# Patient Record
Sex: MALE | Race: WHITE | NOT HISPANIC OR LATINO | Employment: UNEMPLOYED | URBAN - METROPOLITAN AREA
[De-identification: names, ages, dates, MRNs, and addresses within clinical notes are randomized per-mention and may not be internally consistent; named-entity substitution may affect disease eponyms.]

---

## 2021-07-31 ENCOUNTER — HOSPITAL ENCOUNTER (EMERGENCY)
Facility: CLINIC | Age: 61
Discharge: HOME | End: 2021-07-31
Attending: EMERGENCY MEDICINE | Admitting: EMERGENCY MEDICINE
Payer: COMMERCIAL

## 2021-07-31 VITALS
OXYGEN SATURATION: 97 % | HEART RATE: 60 BPM | SYSTOLIC BLOOD PRESSURE: 127 MMHG | DIASTOLIC BLOOD PRESSURE: 82 MMHG | TEMPERATURE: 98 F | RESPIRATION RATE: 14 BRPM | WEIGHT: 222 LBS | BODY MASS INDEX: 30.96 KG/M2

## 2021-07-31 DIAGNOSIS — E10.9 TYPE 1 DIABETES MELLITUS WITHOUT COMPLICATION (H): ICD-10-CM

## 2021-07-31 LAB — GLUCOSE BLDC GLUCOMTR-MCNC: 286 MG/DL (ref 70–99)

## 2021-07-31 PROCEDURE — 99282 EMERGENCY DEPT VISIT SF MDM: CPT | Performed by: EMERGENCY MEDICINE

## 2021-07-31 RX ORDER — FLASH GLUCOSE SCANNING READER
EACH MISCELLANEOUS
Qty: 1 EACH | Refills: 0 | Status: SHIPPED | OUTPATIENT
Start: 2021-07-31 | End: 2024-07-31

## 2021-07-31 NOTE — DISCHARGE INSTRUCTIONS
I am sorry that you had a malfunction of your glucose reader equipment and that you could not get a refill before now    A new prescription for the glucose reader was sent to [facility] pharmacy.    Keep your follow-up appointments as previously scheduled so that you can have appropriate follow-up on your blood sugar management and proceed with getting your pump    Do not hesitate to return to the emergency room if you have any new or concerning symptoms that develop of low or high blood sugar

## 2021-07-31 NOTE — ED PROVIDER NOTES
"  History     Chief Complaint   Patient presents with     Blood Sugar Problem     melvin katherine GRANDA  Galdino Nelson is a 61 year old male who presents to the emergency room because he is unable to test his blood sugar.  Fairly new diagnosis of type 1 diabetes.  He has the Freestyle Felix cardiac monitor, and noted that 2 days ago the reader screen went cloudy and he has been unable to check his blood sugars.  He went to [facility] to get a new reader and they told him he could not have another one until he saw a provider.  He tried calling his primary provider, who is through [employer], and did not get any response.  He is not been symptomatic, and says that \"I know when I am too low and sometimes when I am too high\".  He came to the ER since he did not know where else to go to get this resolved and wants to know what his blood sugars are running.  Denies any chest pain, shortness of breath, vomiting, diarrhea, weakness, blurred vision.    Allergies:  No Known Allergies    Problem List:    Patient Active Problem List    Diagnosis Date Noted     CARDIOVASCULAR SCREENING; LDL GOAL LESS THAN 160 10/31/2010     Priority: Medium        Past Medical History:    No past medical history on file.    Past Surgical History:    No past surgical history on file.    Family History:    No family history on file.    Social History:  Marital Status:  Single [1]  Social History     Tobacco Use     Smoking status: Current Every Day Smoker     Packs/day: 1.00     Smokeless tobacco: Never Used   Substance Use Topics     Alcohol use: Not Currently     Drug use: Not on file        Medications:    Continuous Blood Gluc  (FREESTYLE FELIX 14 DAY READER) CHARLA  Atorvastatin Calcium (LIPITOR PO)  LISINOPRIL PO  PARoxetine HCl (PAXIL PO)          Review of Systems   All other systems reviewed and are negative.      Physical Exam   BP: 127/82  Pulse: 60  Temp: 98  F (36.7  C)  Resp: 14  Weight: 100.7 kg (222 lb)  SpO2: 97 " %      Physical Exam  Vitals and nursing note reviewed.   Constitutional:       General: He is not in acute distress.     Appearance: He is not diaphoretic.   HENT:      Head: Atraumatic.   Eyes:      General: No scleral icterus.     Pupils: Pupils are equal, round, and reactive to light.   Cardiovascular:      Heart sounds: Normal heart sounds.   Pulmonary:      Effort: No respiratory distress.      Breath sounds: Normal breath sounds.   Abdominal:      General: Bowel sounds are normal.      Palpations: Abdomen is soft.      Tenderness: There is no abdominal tenderness.   Musculoskeletal:         General: No tenderness.   Skin:     General: Skin is warm.      Findings: No rash.   Neurological:      Mental Status: He is alert.         ED Course        Procedures              Critical Care time:  none               Results for orders placed or performed during the hospital encounter of 07/31/21 (from the past 24 hour(s))   Glucose by meter   Result Value Ref Range    GLUCOSE BY METER POCT 286 (H) 70 - 99 mg/dL       Medications - No data to display    Assessments & Plan (with Medical Decision Making)  Galdino is a 61-year-old male who presents to the emergency room for issues reading his blood sugar due to malfunctioning glucometer.  See history and focused physical exam as above  Glucose was 286.  No other acute findings on history or physical exam.  Since it is a weekend and patient needs this to monitor his blood sugars, and has been unable to get a hold of his primary provider, will send an order for his  to the pharmacy for him to get a new one.  He is to return to the ER at any time if he has any new or worsening symptoms or has any other problems.  Otherwise he should follow-up with his primary provider.  He states that he is due to get an insulin pump in a few weeks, but does not have all supplies yet.  He feels comfortable with this plan and really just wants to be able to check his blood sugars, is  grateful to have a new order placed.  Discharged in no acute distress.     I have reviewed the nursing notes.    I have reviewed the findings, diagnosis, plan and need for follow up with the patient.       Discharge Medication List as of 7/31/2021 11:34 AM      START taking these medications    Details   Continuous Blood Gluc  (FREESTYLE KATHYA 14 DAY READER) CHARLA Use to read blood sugars as per 's instructions., Disp-1 each, R-0, E-Prescribe             Final diagnoses:   Type 1 diabetes mellitus without complication (H)       7/31/2021   [facility] EMERGENCY DEPT     Juana Cruz DO  07/31/21 6170

## 2021-07-31 NOTE — ED TRIAGE NOTES
"Presents because his glucometer melvin is broken and [facility] will not give him another until he sees a provider. \"I just want to know where my sugars are at\".   "

## 2021-08-21 ENCOUNTER — HOSPITAL ENCOUNTER (EMERGENCY)
Facility: CLINIC | Age: 61
Discharge: HOME | End: 2021-08-21
Attending: NURSE PRACTITIONER | Admitting: NURSE PRACTITIONER
Payer: COMMERCIAL

## 2021-08-21 VITALS
DIASTOLIC BLOOD PRESSURE: 86 MMHG | HEART RATE: 55 BPM | SYSTOLIC BLOOD PRESSURE: 141 MMHG | RESPIRATION RATE: 18 BRPM | TEMPERATURE: 98.3 F | OXYGEN SATURATION: 98 %

## 2021-08-21 DIAGNOSIS — R48.1 LOSS OF PERCEPTION FOR TASTE: ICD-10-CM

## 2021-08-21 DIAGNOSIS — R09.89 CHEST CONGESTION: ICD-10-CM

## 2021-08-21 DIAGNOSIS — Z20.822 ENCOUNTER FOR LABORATORY TESTING FOR COVID-19 VIRUS: ICD-10-CM

## 2021-08-21 LAB — SARS-COV-2 RNA RESP QL NAA+PROBE: POSITIVE

## 2021-08-21 PROCEDURE — 99283 EMERGENCY DEPT VISIT LOW MDM: CPT | Performed by: NURSE PRACTITIONER

## 2021-08-21 PROCEDURE — C9803 HOPD COVID-19 SPEC COLLECT: HCPCS | Performed by: NURSE PRACTITIONER

## 2021-08-21 PROCEDURE — U0005 INFEC AGEN DETEC AMPLI PROBE: HCPCS | Performed by: NURSE PRACTITIONER

## 2021-08-21 PROCEDURE — 99282 EMERGENCY DEPT VISIT SF MDM: CPT | Performed by: NURSE PRACTITIONER

## 2021-08-21 ASSESSMENT — ENCOUNTER SYMPTOMS
FEVER: 0
SLEEP DISTURBANCE: 0
DIAPHORESIS: 0
WHEEZING: 0

## 2021-08-21 NOTE — ED TRIAGE NOTES
"Pt stated \"I have been feeling crappy for the last couple days and my son just called and said his entire family tested positive for COVID and I was with them in the past couple days. I want to get a test and figure things out.\"   "

## 2021-08-21 NOTE — ED PROVIDER NOTES
History     Chief Complaint   Patient presents with     Covid Concern     HPI  Galdino Nelson is a 61 year old male who presents with chest congestion and burning pain with deep breathing of the lungs, also decrease in taste and smell concerning for COVID-19.  He reports he has COPD but no worsening symptoms.  He has not had any fever or chills, no nausea or vomiting, no chest pain, no diarrhea, no rash.  He would like COVID-19 testing performed.    Allergies:  No Known Allergies    Problem List:    Patient Active Problem List    Diagnosis Date Noted     CARDIOVASCULAR SCREENING; LDL GOAL LESS THAN 160 10/31/2010     Priority: Medium        Past Medical History:    History reviewed. No pertinent past medical history.    Past Surgical History:    History reviewed. No pertinent surgical history.    Family History:    No family history on file.    Social History:  Marital Status:  Single [1]  Social History     Tobacco Use     Smoking status: Current Every Day Smoker     Packs/day: 1.00     Smokeless tobacco: Never Used   Substance Use Topics     Alcohol use: Not Currently     Drug use: None        Medications:    Atorvastatin Calcium (LIPITOR PO)  Continuous Blood Gluc  (FREESTYLE KATHYA 14 DAY READER) CHARLA  LISINOPRIL PO  PARoxetine HCl (PAXIL PO)          Review of Systems   Constitutional: Negative for diaphoresis and fever.   Respiratory: Negative for wheezing.    Cardiovascular: Negative for leg swelling.   Psychiatric/Behavioral: Negative for sleep disturbance.       Physical Exam   BP: (!) 141/86  Pulse: 55  Temp: 98.3  F (36.8  C)  Resp: 18  SpO2: 98 %      Physical Exam  Vitals and nursing note reviewed.   Constitutional:       General: He is not in acute distress.     Appearance: Normal appearance. He is not ill-appearing or toxic-appearing.   HENT:      Head: Normocephalic and atraumatic.   Cardiovascular:      Rate and Rhythm: Normal rate and regular rhythm.      Heart sounds: Normal heart  sounds.   Pulmonary:      Effort: Pulmonary effort is normal.      Breath sounds: Normal breath sounds.   Skin:     General: Skin is warm and dry.   Neurological:      General: No focal deficit present.      Mental Status: He is alert.   Psychiatric:         Mood and Affect: Mood normal.         ED Course  I reviewed with the patient COVID-19 testing and to maintain self quarantine until resulted.  If he is positive for COVID-19 I would like him to reach out to his primary care provider as he may qualify for monoclonal antibody infusion.  Directed on purchasing pulse oximeter and testing his oxygen sats as he does have COPD and if consistently less than 90% on room air he is to return back to the ED.  Patient verbalized understanding and discharged home in stable condition post COVID-19 test.               No results found for this or any previous visit (from the past 24 hour(s)).    Medications - No data to display    Assessments & Plan (with Medical Decision Making)     I have reviewed the nursing notes.    I have reviewed the findings, diagnosis, plan and need for follow up with the patient.      New Prescriptions    No medications on file       Final diagnoses:   Chest congestion   Loss of perception for taste   Encounter for laboratory testing for COVID-19 virus       8/21/2021   [facility] EMERGENCY DEPT     Tiana Gordon APRN CNP  08/21/21 6997

## 2021-08-21 NOTE — DISCHARGE INSTRUCTIONS
If you are positive for COVID-19 I recommend purchasing a pulse oximeter.  Periodically check your oxygen saturations and if less than 90% recommend coming back to the emergency department for evaluation.  If you are positive for COVID-19 contact your primary care provider for potential qualification for monoclonal antibody infusion.    Until you get your COVID-19 results maintain self quarantine as directed by the CDC.

## 2021-08-22 ENCOUNTER — TELEPHONE (OUTPATIENT)
Dept: EMERGENCY MEDICINE | Facility: CLINIC | Age: 61
End: 2021-08-22

## 2021-08-22 NOTE — TELEPHONE ENCOUNTER
Coronavirus (COVID-19) Notification    Reason for call  Notify of POSITIVE  COVID-19 lab result, assess symptoms,  review [facility] recommendations    Lab Result   Lab test for 2019-nCoV rRt-PCR or SARS-COV-2 PCR  Oropharyngeal AND/OR nasopharyngeal swabs were POSITIVE for 2019-nCoV RNA [OR] SARS-COV-2 RNA (COVID-19) RNA     We have been unable to reach Patient by phone at this time to notify of their Positive COVID-19 result.  Left voicemail message requesting a call back to [phone] [facility] for results.        POSITIVE COVID-19 Letter sent.    Kyra Enciso LPN

## 2021-09-05 ENCOUNTER — HEALTH MAINTENANCE LETTER (OUTPATIENT)
Age: 61
End: 2021-09-05

## 2021-12-26 ENCOUNTER — HEALTH MAINTENANCE LETTER (OUTPATIENT)
Age: 61
End: 2021-12-26

## 2022-04-17 ENCOUNTER — HEALTH MAINTENANCE LETTER (OUTPATIENT)
Age: 62
End: 2022-04-17

## 2022-06-14 ENCOUNTER — HOSPITAL ENCOUNTER (EMERGENCY)
Facility: CLINIC | Age: 62
Discharge: HOME | End: 2022-06-14
Attending: EMERGENCY MEDICINE | Admitting: EMERGENCY MEDICINE
Payer: COMMERCIAL

## 2022-06-14 ENCOUNTER — APPOINTMENT (OUTPATIENT)
Dept: CT IMAGING | Facility: CLINIC | Age: 62
End: 2022-06-14
Attending: EMERGENCY MEDICINE
Payer: COMMERCIAL

## 2022-06-14 VITALS
RESPIRATION RATE: 20 BRPM | DIASTOLIC BLOOD PRESSURE: 71 MMHG | WEIGHT: 198 LBS | OXYGEN SATURATION: 99 % | BODY MASS INDEX: 27.62 KG/M2 | SYSTOLIC BLOOD PRESSURE: 103 MMHG | TEMPERATURE: 98 F | HEART RATE: 58 BPM

## 2022-06-14 DIAGNOSIS — R53.1 WEAKNESS: ICD-10-CM

## 2022-06-14 DIAGNOSIS — R00.2 PALPITATIONS: ICD-10-CM

## 2022-06-14 DIAGNOSIS — R42 DIZZINESS: ICD-10-CM

## 2022-06-14 DIAGNOSIS — R07.89 CHEST PRESSURE: ICD-10-CM

## 2022-06-14 PROBLEM — I25.10 CAD IN NATIVE ARTERY: Status: ACTIVE | Noted: 2018-09-26

## 2022-06-14 PROBLEM — E11.9 DIABETES MELLITUS, TYPE II (H): Status: ACTIVE | Noted: 2019-09-29

## 2022-06-14 PROBLEM — Z96.41 INSULIN PUMP STATUS: Status: ACTIVE | Noted: 2022-01-24

## 2022-06-14 PROBLEM — E78.5 HYPERLIPIDEMIA: Status: ACTIVE | Noted: 2018-09-26

## 2022-06-14 LAB
ANION GAP SERPL CALCULATED.3IONS-SCNC: 3 MMOL/L (ref 3–14)
BASOPHILS # BLD AUTO: 0.1 10E3/UL (ref 0–0.2)
BASOPHILS NFR BLD AUTO: 1 %
BUN SERPL-MCNC: 27 MG/DL (ref 7–30)
CALCIUM SERPL-MCNC: 8.5 MG/DL (ref 8.5–10.1)
CHLORIDE BLD-SCNC: 105 MMOL/L (ref 94–109)
CO2 SERPL-SCNC: 29 MMOL/L (ref 20–32)
CREAT SERPL-MCNC: 1.26 MG/DL (ref 0.66–1.25)
D DIMER PPP FEU-MCNC: 0.51 UG/ML FEU (ref 0–0.5)
EOSINOPHIL # BLD AUTO: 0.2 10E3/UL (ref 0–0.7)
EOSINOPHIL NFR BLD AUTO: 2 %
ERYTHROCYTE [DISTWIDTH] IN BLOOD BY AUTOMATED COUNT: 13.2 % (ref 10–15)
GFR SERPL CREATININE-BSD FRML MDRD: 64 ML/MIN/1.73M2
GLUCOSE BLD-MCNC: 349 MG/DL (ref 70–99)
HCT VFR BLD AUTO: 42.1 % (ref 40–53)
HGB BLD-MCNC: 14.7 G/DL (ref 13.3–17.7)
HOLD SPECIMEN: NORMAL
IMM GRANULOCYTES # BLD: 0 10E3/UL
IMM GRANULOCYTES NFR BLD: 0 %
LYMPHOCYTES # BLD AUTO: 1.8 10E3/UL (ref 0.8–5.3)
LYMPHOCYTES NFR BLD AUTO: 24 %
MCH RBC QN AUTO: 30.1 PG (ref 26.5–33)
MCHC RBC AUTO-ENTMCNC: 34.9 G/DL (ref 31.5–36.5)
MCV RBC AUTO: 86 FL (ref 78–100)
MONOCYTES # BLD AUTO: 0.5 10E3/UL (ref 0–1.3)
MONOCYTES NFR BLD AUTO: 6 %
NEUTROPHILS # BLD AUTO: 5.2 10E3/UL (ref 1.6–8.3)
NEUTROPHILS NFR BLD AUTO: 67 %
NRBC # BLD AUTO: 0 10E3/UL
NRBC BLD AUTO-RTO: 0 /100
PLATELET # BLD AUTO: 165 10E3/UL (ref 150–450)
POTASSIUM BLD-SCNC: 3.7 MMOL/L (ref 3.4–5.3)
RBC # BLD AUTO: 4.88 10E6/UL (ref 4.4–5.9)
SODIUM SERPL-SCNC: 137 MMOL/L (ref 133–144)
TROPONIN I SERPL HS-MCNC: 6 NG/L
TSH SERPL DL<=0.005 MIU/L-ACNC: 1.08 MU/L (ref 0.4–4)
WBC # BLD AUTO: 7.7 10E3/UL (ref 4–11)

## 2022-06-14 PROCEDURE — 80051 ELECTROLYTE PANEL: CPT | Performed by: EMERGENCY MEDICINE

## 2022-06-14 PROCEDURE — 96360 HYDRATION IV INFUSION INIT: CPT | Mod: 59

## 2022-06-14 PROCEDURE — 36415 COLL VENOUS BLD VENIPUNCTURE: CPT | Performed by: EMERGENCY MEDICINE

## 2022-06-14 PROCEDURE — 71275 CT ANGIOGRAPHY CHEST: CPT

## 2022-06-14 PROCEDURE — 99284 EMERGENCY DEPT VISIT MOD MDM: CPT | Performed by: EMERGENCY MEDICINE

## 2022-06-14 PROCEDURE — 250N000011 HC RX IP 250 OP 636: Performed by: EMERGENCY MEDICINE

## 2022-06-14 PROCEDURE — 84443 ASSAY THYROID STIM HORMONE: CPT | Performed by: EMERGENCY MEDICINE

## 2022-06-14 PROCEDURE — 258N000003 HC RX IP 258 OP 636: Performed by: EMERGENCY MEDICINE

## 2022-06-14 PROCEDURE — 85379 FIBRIN DEGRADATION QUANT: CPT | Performed by: EMERGENCY MEDICINE

## 2022-06-14 PROCEDURE — 85025 COMPLETE CBC W/AUTO DIFF WBC: CPT | Performed by: EMERGENCY MEDICINE

## 2022-06-14 PROCEDURE — 250N000009 HC RX 250: Performed by: EMERGENCY MEDICINE

## 2022-06-14 PROCEDURE — 84484 ASSAY OF TROPONIN QUANT: CPT | Performed by: EMERGENCY MEDICINE

## 2022-06-14 PROCEDURE — 99285 EMERGENCY DEPT VISIT HI MDM: CPT | Mod: 25

## 2022-06-14 RX ORDER — METHOCARBAMOL 750 MG/1
750-1500 TABLET, FILM COATED ORAL 3 TIMES DAILY PRN
Status: ON HOLD | COMMUNITY
Start: 2022-05-04 | End: 2023-10-04

## 2022-06-14 RX ORDER — IOPAMIDOL 755 MG/ML
500 INJECTION, SOLUTION INTRAVASCULAR ONCE
Status: COMPLETED | OUTPATIENT
Start: 2022-06-14 | End: 2022-06-14

## 2022-06-14 RX ORDER — PAROXETINE 30 MG/1
30 TABLET, FILM COATED ORAL DAILY
COMMUNITY
Start: 2022-06-08 | End: 2024-09-09

## 2022-06-14 RX ORDER — AMOXICILLIN 500 MG/1
2000 CAPSULE ORAL
Status: ON HOLD | COMMUNITY
End: 2023-10-06

## 2022-06-14 RX ORDER — INSULIN LISPRO 100 [IU]/ML
70 INJECTION, SOLUTION INTRAVENOUS; SUBCUTANEOUS DAILY
Status: ON HOLD | COMMUNITY
Start: 2022-04-30 | End: 2023-10-06

## 2022-06-14 RX ORDER — LOSARTAN POTASSIUM AND HYDROCHLOROTHIAZIDE 12.5; 5 MG/1; MG/1
1 TABLET ORAL DAILY
Status: ON HOLD | COMMUNITY
Start: 2021-11-11 | End: 2023-10-04

## 2022-06-14 RX ORDER — ATORVASTATIN CALCIUM 40 MG/1
40 TABLET, FILM COATED ORAL DAILY
Status: ON HOLD | COMMUNITY
Start: 2020-08-14 | End: 2023-10-04

## 2022-06-14 RX ORDER — METOPROLOL SUCCINATE 50 MG/1
1 TABLET, EXTENDED RELEASE ORAL DAILY
COMMUNITY
Start: 2022-06-08 | End: 2024-07-31

## 2022-06-14 RX ORDER — SEMAGLUTIDE 1.34 MG/ML
0.5 INJECTION, SOLUTION SUBCUTANEOUS
Status: ON HOLD | COMMUNITY
Start: 2021-12-13 | End: 2023-10-04

## 2022-06-14 RX ORDER — BLOOD SUGAR DIAGNOSTIC
1 STRIP MISCELLANEOUS 3 TIMES DAILY
COMMUNITY
Start: 2022-01-20

## 2022-06-14 RX ADMIN — IOPAMIDOL 67 ML: 755 INJECTION, SOLUTION INTRAVENOUS at 16:03

## 2022-06-14 RX ADMIN — SODIUM CHLORIDE 70 ML: 9 INJECTION, SOLUTION INTRAVENOUS at 16:03

## 2022-06-14 RX ADMIN — SODIUM CHLORIDE 1000 ML: 9 INJECTION, SOLUTION INTRAVENOUS at 15:55

## 2022-06-14 NOTE — ED TRIAGE NOTES
Patient c/o intermittent racing heart and chest pain x1 week. Heart rate in the 60's at this time.     Triage Assessment     Row Name 06/14/22 0160       Triage Assessment (Adult)    Airway WDL WDL       Respiratory WDL    Respiratory WDL WDL       Skin Circulation/Temperature WDL    Skin Circulation/Temperature WDL WDL

## 2022-06-14 NOTE — DISCHARGE INSTRUCTIONS
I put in an order for a cardiac monitor.  This can be placed in this facility tomorrow.    Please make an appointment to see your primary care provider soon as possible.  I will try to have the results sent to your primary care provider.    I would also recommend getting a stress test and discuss referral to cardiology for continued symptoms.    Return at anytime for significant worsening, changes or concerns    I hope you start to feel much better quickly!!

## 2022-06-15 ENCOUNTER — PATIENT OUTREACH (OUTPATIENT)
Dept: CARE COORDINATION | Facility: CLINIC | Age: 62
End: 2022-06-15
Payer: COMMERCIAL

## 2022-06-15 DIAGNOSIS — Z71.89 OTHER SPECIFIED COUNSELING: ICD-10-CM

## 2022-06-15 NOTE — ED PROVIDER NOTES
"  History     Chief Complaint   Patient presents with     Tachycardia     HPI  History per patient and medical records    This is a 62-year-old male, history of insulin-dependent diabetes, hypertension, hyperlipidemia, migraine, COPD with continued tobacco use, presenting with tachycardia.  Patient states that he started having symptoms of a \"hard time breathing\" 6 days ago while he was building a deck.  He apparently also had some chest tightness.  He was brought via EMS to an outside ED.  EKG showed no obvious ischemia but he did have some PVCs, he had 2 negative troponins.  Stress test recommended.    Patient notes that he continues to have symptoms including weakness, chest tightness and also has developed dizziness.  He describes the dizziness as a spinning that happens with different changing positions including sitting to standing, bending, etc.  He has felt symptoms of his heart racing and pounding even without exertion.  He denies any fevers, chills, sore throat, headache.  No nausea, vomiting, bowel or bladder changes.  No calf pain or tenderness.  No cough.  He does smoke 1 pack/day.  His blood sugars average 150.  He takes his blood pressure medication but often forgets to take his cholesterol medication.  He is not on blood thinners.  No history of blood clots.    Allergies:  No Known Allergies    Problem List:    Patient Active Problem List    Diagnosis Date Noted     Insulin pump status 01/24/2022     Priority: Medium     Formatting of this note might be different from the original.  770 G- 1/21/2022       Diabetes mellitus, type II (H) 09/29/2019     Priority: Medium     CAD in native artery 09/26/2018     Priority: Medium     Formatting of this note might be different from the original.  Pt reports Hx of MI in 2002- No stents.   Stress Test 2009- No ischemia.   On metoprolol, atorvastatin, recommend baby ASA daily.       Hyperlipidemia 09/26/2018     Priority: Medium     Formatting of this note " might be different from the original.  On Atorvastatin.       Chronic obstructive pulmonary disease (H) 11/18/2011     Priority: Medium     Formatting of this note might be different from the original.  PFT's done 7/2018- showing moderate COPD with positive bronchodilator response  Smoking Cessation Advised.   No on any daily medications- No frequent exacerbations/hospitalizations.       Migraine 11/18/2011     Priority: Medium     CARDIOVASCULAR SCREENING; LDL GOAL LESS THAN 160 10/31/2010     Priority: Medium     Tobacco use disorder 08/10/2006     Priority: Medium     Formatting of this note might be different from the original.  Started on Chantix 8/2018. Working on quitting on 10/6/2018. History of smoking for 45 years, 1 PPD.       Essential hypertension 01/12/2005     Priority: Medium     Formatting of this note might be different from the original.  Updated by system to replace inactive record  Formatting of this note might be different from the original.  Epic          Past Medical History:    Past Medical History:   Diagnosis Date     Chronic obstructive pulmonary disease (H) 11/18/2011       Past Surgical History:    No past surgical history on file.    Family History:    No family history on file.    Social History:  Marital Status:  Single [1]  Social History     Tobacco Use     Smoking status: Current Every Day Smoker     Packs/day: 1.00     Smokeless tobacco: Never Used   Substance Use Topics     Alcohol use: Not Currently        Medications:    amoxicillin (AMOXIL) 500 MG capsule  atorvastatin (LIPITOR) 40 MG tablet  blood glucose (ACCU-CHEK GUIDE) test strip  insulin lispro (HUMALOG VIAL) 100 UNIT/ML vial  losartan-hydrochlorothiazide (HYZAAR) 50-12.5 MG tablet  methocarbamol (ROBAXIN) 750 MG tablet  metoprolol succinate ER (TOPROL XL) 50 MG 24 hr tablet  PARoxetine (PAXIL) 30 MG tablet  semaglutide (OZEMPIC, 0.25 OR 0.5 MG/DOSE,) 2 MG/1.5ML SOPN pen  Continuous Blood Gluc  (FREESTYLE KATHYA  14 DAY READER) CHARLA          Review of Systems   All other ROS reviewed and are negative or non-contributory except as stated in HPI.     Physical Exam   BP: 99/62  Pulse: 67  Temp: 98  F (36.7  C)  Resp: 20  Weight: 89.8 kg (198 lb)  SpO2: 98 %      Physical Exam  Vitals and nursing note reviewed.   Constitutional:       Appearance: Normal appearance. He is normal weight.      Comments: Pleasant, healthy-appearing male sitting in the bed   HENT:      Head: Normocephalic.      Nose: Nose normal.      Mouth/Throat:      Comments: Tacky mucous membranes  Eyes:      Extraocular Movements: Extraocular movements intact.      Conjunctiva/sclera: Conjunctivae normal.   Cardiovascular:      Rate and Rhythm: Normal rate and regular rhythm.      Pulses: Normal pulses.      Heart sounds: Normal heart sounds.   Pulmonary:      Effort: Pulmonary effort is normal.      Breath sounds: Normal breath sounds.      Comments: No obvious wheeze or adventitious breath sounds even with forced expiration  Abdominal:      Palpations: Abdomen is soft.      Tenderness: There is no abdominal tenderness.   Musculoskeletal:         General: No tenderness. Normal range of motion.      Cervical back: Normal range of motion and neck supple.      Right lower leg: No edema.      Left lower leg: No edema.   Skin:     General: Skin is warm and dry.   Neurological:      General: No focal deficit present.      Mental Status: He is alert and oriented to person, place, and time.   Psychiatric:         Mood and Affect: Mood normal.         Behavior: Behavior normal.         ED Course (with Medical Decision Making)    Pt seen and examined by me.  RN and EPIC notes reviewed.       Patient with symptoms of heart racing, chest tightness, dizziness, weakness, recent dyspnea on exertion.  He also recently was seen and had 2 negative troponins and unremarkable EKG about 1 week ago.  On exam, patient's vital signs are within normal limits, lungs are clear.    EKG  was done.  This showed normal sinus rhythm with a rate of 61.  There is no ectopy.  No acute ST segment or other abnormalities.  BMP shows mildly elevated BUN/creatinine.  He was given some fluids.  CBC within normal limits.  Troponin normal.  D-dimer mildly elevated.  TSH is normal.  CT scan for PE study was done.  This showed no evidence for PE or other obvious acute abnormality.  He does have a small amount of coronary calcifications.    We did orthostatics which were unremarkable.  Some ectopy noted with orthostatics.    I discussed all the results at length with the patient.  He already has been referred for stress test which I think is important.  I am also going to place an order for a Zio patch.  I would like him to call and follow-up closely with his primary care provider and consider referral for cardiology.  Continue to monitor symptoms.  If he has any significant worsening, changes or concerns return promptly at any time.        Procedures    Results for orders placed or performed during the hospital encounter of 06/14/22 (from the past 24 hour(s))   CBC with Platelets & Differential    Narrative    The following orders were created for panel order CBC with Platelets & Differential.  Procedure                               Abnormality         Status                     ---------                               -----------         ------                     CBC with platelets and d...[[id number]]                      Final result                 Please view results for these tests on the individual orders.   Basic metabolic panel   Result Value Ref Range    Sodium 137 133 - 144 mmol/L    Potassium 3.7 3.4 - 5.3 mmol/L    Chloride 105 94 - 109 mmol/L    Carbon Dioxide (CO2) 29 20 - 32 mmol/L    Anion Gap 3 3 - 14 mmol/L    Urea Nitrogen 27 7 - 30 mg/dL    Creatinine 1.26 (H) 0.66 - 1.25 mg/dL    Calcium 8.5 8.5 - 10.1 mg/dL    Glucose 349 (H) 70 - 99 mg/dL    GFR Estimate 64 >60 mL/min/1.73m2   Troponin I    Result Value Ref Range    Troponin I High Sensitivity 6 <79 ng/L   [place] Draw    Narrative    The following orders were created for panel order [place] Draw.  Procedure                               Abnormality         Status                     ---------                               -----------         ------                     Extra Blue Top Tube[[id number]]                              Final result                 Please view results for these tests on the individual orders.   CBC with platelets and differential   Result Value Ref Range    WBC Count 7.7 4.0 - 11.0 10e3/uL    RBC Count 4.88 4.40 - 5.90 10e6/uL    Hemoglobin 14.7 13.3 - 17.7 g/dL    Hematocrit 42.1 40.0 - 53.0 %    MCV 86 78 - 100 fL    MCH 30.1 26.5 - 33.0 pg    MCHC 34.9 31.5 - 36.5 g/dL    RDW 13.2 10.0 - 15.0 %    Platelet Count 165 150 - 450 10e3/uL    % Neutrophils 67 %    % Lymphocytes 24 %    % Monocytes 6 %    % Eosinophils 2 %    % Basophils 1 %    % Immature Granulocytes 0 %    NRBCs per 100 WBC 0 <1 /100    Absolute Neutrophils 5.2 1.6 - 8.3 10e3/uL    Absolute Lymphocytes 1.8 0.8 - 5.3 10e3/uL    Absolute Monocytes 0.5 0.0 - 1.3 10e3/uL    Absolute Eosinophils 0.2 0.0 - 0.7 10e3/uL    Absolute Basophils 0.1 0.0 - 0.2 10e3/uL    Absolute Immature Granulocytes 0.0 <=0.4 10e3/uL    Absolute NRBCs 0.0 10e3/uL   Extra Blue Top Tube   Result Value Ref Range    Hold Specimen [facility]    D dimer quantitative   Result Value Ref Range    D-Dimer Quantitative 0.51 (H) 0.00 - 0.50 ug/mL FEU    Narrative    This D-dimer assay is intended for use in conjunction with a clinical pretest probability assessment model to exclude pulmonary embolism (PE) and deep venous thrombosis (DVT) in outpatients suspected of PE or DVT. The cut-off value is 0.50 ug/mL FEU.   TSH with free T4 reflex   Result Value Ref Range    TSH 1.08 0.40 - 4.00 mU/L   CT Chest Pulmonary Embolism w Contrast    Narrative    CT CHEST PULMONARY EMBOLISM W CONTRAST 6/14/2022 4:16  PM    CLINICAL HISTORY: Dyspnea on exertion/shortness of breath,  palpitations  TECHNIQUE: CT angiogram chest during arterial phase injection IV  contrast. 2D and 3D MIP reconstructions were performed by the CT  technologist. Dose reduction techniques were used.     CONTRAST: ISOVUE-370, 67mL    COMPARISON: Chest radiograph 5/10/2016    FINDINGS:  ANGIOGRAM CHEST: Pulmonary arteries are normal caliber and negative  for pulmonary emboli. Thoracic aorta is not well opacified and is  indeterminate for dissection. No CT evidence of right heart strain.    LUNGS AND PLEURA: Normal.    MEDIASTINUM/AXILLAE: Normal.    CORONARY ARTERY CALCIFICATION: Mild.    UPPER ABDOMEN: Unremarkable.    MUSCULOSKELETAL: No acute bony abnormality.      Impression    IMPRESSION:  1.  No evidence of pulmonary embolus or other acute abnormality in the  chest.    NAS HOUSE MD         SYSTEM ID:  [id number]       Medications   0.9% sodium chloride BOLUS (0 mLs Intravenous Stopped 6/14/22 1701)   iopamidol (ISOVUE-370) solution 500 mL (67 mLs Intravenous Given 6/14/22 1603)   sodium chloride 0.9 % bag 100mL for CT scan flush use (70 mLs Intravenous Given 6/14/22 1603)       Assessments & Plan      I have reviewed the findings, diagnosis, plan and need for follow up with the patient.    Discharge Medication List as of 6/14/2022  5:17 PM          Final diagnoses:   Palpitations   Chest pressure   Dizziness   Weakness     Disposition: Patient discharged home in stable condition.  Plan as above.  Return for concerns.     Note: Chart documentation done in part with Dragon Voice Recognition software. Although reviewed after completion, some word and grammatical errors may remain.     6/14/2022   [facility] EMERGENCY DEPT     Jana Negron MD  06/14/22 2032

## 2022-06-15 NOTE — PROGRESS NOTES
Clinic Care Coordination Contact  [facility]/Voicemail     Clinical Data: Care Coordinator Outreach - TCM      Outreach attempted x 1.  Left message on patient's voicemail, providing [facility]'s 24/7 scheduling and nurse triage phone number [phone] ([phone]) for questions/concerns.      Plan:  Care Coordinator will try to reach patient again in 1-2 business days.       Linda Richardson, DIAN  [facility], [facility]

## 2022-06-16 ENCOUNTER — HOSPITAL ENCOUNTER (OUTPATIENT)
Dept: CARDIOLOGY | Facility: CLINIC | Age: 62
Discharge: HOME | End: 2022-06-16
Attending: EMERGENCY MEDICINE | Admitting: EMERGENCY MEDICINE
Payer: COMMERCIAL

## 2022-06-16 DIAGNOSIS — R07.89 CHEST PRESSURE: ICD-10-CM

## 2022-06-16 DIAGNOSIS — R42 DIZZINESS: ICD-10-CM

## 2022-06-16 DIAGNOSIS — R00.2 PALPITATIONS: ICD-10-CM

## 2022-06-16 PROCEDURE — 93225 XTRNL ECG REC<48 HRS REC: CPT

## 2022-06-16 NOTE — PROGRESS NOTES
"Clinic Care Coordination Contact  [facility]: Post-Discharge Note  SITUATION                                                      Admission:    Admission Date: 06/14/22   Reason for Admission: Palpitations,  Chest pressure,    Dizziness,    Weakness  Discharge:   Discharge Date: 06/14/22  Discharge Diagnosis: Palpitations,    Chest pressure,    Dizziness,    Weakness    BACKGROUND                                                      Per hospital discharge summary and inpatient provider notes:    This is a 62-year-old male, history of insulin-dependent diabetes, hypertension, hyperlipidemia, migraine, COPD with continued tobacco use, presenting with tachycardia.  Patient states that he started having symptoms of a \"hard time breathing\" 6 days ago while he was building a deck.  He apparently also had some chest tightness.  He was brought via EMS to an outside ED.  EKG showed no obvious ischemia but he did have some PVCs, he had 2 negative troponins.  Stress test recommended.     Patient notes that he continues to have symptoms including weakness, chest tightness and also has developed dizziness.  He describes the dizziness as a spinning that happens with different changing positions including sitting to standing, bending, etc.  He has felt symptoms of his heart racing and pounding even without exertion.  He denies any fevers, chills, sore throat, headache.  No nausea, vomiting, bowel or bladder changes.  No calf pain or tenderness.  No cough.  He does smoke 1 pack/day.  His blood sugars average 150.  He takes his blood pressure medication but often forgets to take his cholesterol medication.  He is not on blood thinners.  No history of blood clots.      ASSESSMENT      Enrollment  Primary Care Care Coordination Status: Not a Candidate    Discharge Assessment  How are you doing now that you are home?: Patient states he is about the same. Patient states when he is not doing much he feels fine.  How are your " symptoms? (Red Flag symptoms escalate to triage hotline per guidelines): Unchanged  Do you feel your condition is stable enough to be safe at home until your provider visit?: Yes  Does the patient have their discharge instructions? : Yes  Does the patient have questions regarding their discharge instructions? : No  Were you started on any new medications or were there changes to any of your previous medications? : No  Does the patient have all of their medications?:  (N/A- No change in medications.)  Do you have questions regarding any of your medications? :  (N/A- No change in medications.)  Do you have all of your needed medical supplies or equipment (DME)?  (i.e. oxygen tank, CPAP, cane, etc.):  (N/A)  Discharge follow-up appointment scheduled within 14 calendar days? : No  Is patient agreeable to assistance with scheduling? : No (Patient states he will follow up with PCP after he sees Cardiology. Has appointment today for cardiac monitor.)         Post-op (Clinicians Only)  Did the patient have surgery or a procedure: No    Patient states he has an appointment today at 2:15 for cardiac monitor.  Patient states he does not have appointment to see his primary provider but wants to wait to schedule until he sees the cardiologist.      Patient states his chest pain has improved. States it is very slight now where it was more severe in the hospital. Advised if worsening or he develops shortness of breath in addition to the chest pain to go to ED. Patient agreed to plan.     PLAN                                                      Outpatient Plan:  Schedule an appointment with primary provider; And discuss a stress test.        Future Appointments   Date Time Provider Department Center   6/16/2022  2:15 PM PH EVENT/HOLTER MONITOR [facility]         For any urgent concerns, please contact our 24 hour nurse triage line: [phone] ([phone])         Linda Richardson RN

## 2022-08-07 ENCOUNTER — HEALTH MAINTENANCE LETTER (OUTPATIENT)
Age: 62
End: 2022-08-07

## 2022-10-23 ENCOUNTER — HEALTH MAINTENANCE LETTER (OUTPATIENT)
Age: 62
End: 2022-10-23

## 2022-11-09 ENCOUNTER — TRANSCRIBE ORDERS (OUTPATIENT)
Dept: OTHER | Age: 62
End: 2022-11-09

## 2022-11-09 DIAGNOSIS — M54.50 ACUTE MIDLINE LOW BACK PAIN WITHOUT SCIATICA: Primary | ICD-10-CM

## 2022-11-15 ENCOUNTER — HOSPITAL ENCOUNTER (OUTPATIENT)
Dept: PHYSICAL THERAPY | Facility: CLINIC | Age: 62
Setting detail: THERAPIES SERIES
Discharge: HOME | End: 2022-11-15
Attending: FAMILY MEDICINE
Payer: COMMERCIAL

## 2022-11-15 PROCEDURE — 97110 THERAPEUTIC EXERCISES: CPT | Mod: GP | Performed by: PHYSICAL THERAPIST

## 2022-11-15 PROCEDURE — 97161 PT EVAL LOW COMPLEX 20 MIN: CPT | Mod: GP | Performed by: PHYSICAL THERAPIST
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
## 2022-11-30 ENCOUNTER — HOSPITAL ENCOUNTER (OUTPATIENT)
Dept: PHYSICAL THERAPY | Facility: CLINIC | Age: 62
Setting detail: THERAPIES SERIES
Discharge: HOME | End: 2022-11-30
Attending: FAMILY MEDICINE
Payer: COMMERCIAL

## 2022-11-30 PROCEDURE — 97014 ELECTRIC STIMULATION THERAPY: CPT | Mod: GP | Performed by: PHYSICAL THERAPIST

## 2022-11-30 PROCEDURE — 97110 THERAPEUTIC EXERCISES: CPT | Mod: GP | Performed by: PHYSICAL THERAPIST

## 2022-11-30 PROCEDURE — 97010 HOT OR COLD PACKS THERAPY: CPT | Mod: GP | Performed by: PHYSICAL THERAPIST

## 2022-12-07 ENCOUNTER — HOSPITAL ENCOUNTER (OUTPATIENT)
Dept: PHYSICAL THERAPY | Facility: CLINIC | Age: 62
Setting detail: THERAPIES SERIES
Discharge: HOME | End: 2022-12-07
Attending: FAMILY MEDICINE
Payer: COMMERCIAL

## 2022-12-07 PROCEDURE — 97010 HOT OR COLD PACKS THERAPY: CPT | Mod: GP | Performed by: PHYSICAL THERAPIST

## 2022-12-07 PROCEDURE — 97014 ELECTRIC STIMULATION THERAPY: CPT | Mod: GP | Performed by: PHYSICAL THERAPIST

## 2022-12-07 PROCEDURE — 97110 THERAPEUTIC EXERCISES: CPT | Mod: GP | Performed by: PHYSICAL THERAPIST

## 2022-12-10 ENCOUNTER — HEALTH MAINTENANCE LETTER (OUTPATIENT)
Age: 62
End: 2022-12-10

## 2022-12-21 ENCOUNTER — HOSPITAL ENCOUNTER (OUTPATIENT)
Dept: PHYSICAL THERAPY | Facility: CLINIC | Age: 62
Setting detail: THERAPIES SERIES
Discharge: HOME | End: 2022-12-21
Attending: FAMILY MEDICINE
Payer: COMMERCIAL

## 2022-12-21 PROCEDURE — 97010 HOT OR COLD PACKS THERAPY: CPT | Mod: GP | Performed by: PHYSICAL THERAPIST

## 2022-12-21 PROCEDURE — 97110 THERAPEUTIC EXERCISES: CPT | Mod: GP | Performed by: PHYSICAL THERAPIST

## 2022-12-21 PROCEDURE — 97014 ELECTRIC STIMULATION THERAPY: CPT | Mod: GP | Performed by: PHYSICAL THERAPIST

## 2022-12-27 ENCOUNTER — HOSPITAL ENCOUNTER (OUTPATIENT)
Dept: PHYSICAL THERAPY | Facility: CLINIC | Age: 62
Setting detail: THERAPIES SERIES
Discharge: HOME | End: 2022-12-27
Attending: FAMILY MEDICINE
Payer: COMMERCIAL

## 2022-12-27 PROCEDURE — 97110 THERAPEUTIC EXERCISES: CPT | Mod: GP | Performed by: PHYSICAL THERAPIST

## 2022-12-27 PROCEDURE — 97014 ELECTRIC STIMULATION THERAPY: CPT | Mod: GP | Performed by: PHYSICAL THERAPIST

## 2022-12-27 PROCEDURE — 97010 HOT OR COLD PACKS THERAPY: CPT | Mod: GP | Performed by: PHYSICAL THERAPIST

## 2023-01-12 ENCOUNTER — HOSPITAL ENCOUNTER (OUTPATIENT)
Dept: PHYSICAL THERAPY | Facility: CLINIC | Age: 63
Setting detail: THERAPIES SERIES
Discharge: HOME | End: 2023-01-12
Attending: FAMILY MEDICINE
Payer: COMMERCIAL

## 2023-01-12 PROCEDURE — 97010 HOT OR COLD PACKS THERAPY: CPT | Mod: GP | Performed by: PHYSICAL THERAPIST

## 2023-01-12 PROCEDURE — 97110 THERAPEUTIC EXERCISES: CPT | Mod: GP | Performed by: PHYSICAL THERAPIST

## 2023-01-12 PROCEDURE — 97014 ELECTRIC STIMULATION THERAPY: CPT | Mod: GP | Performed by: PHYSICAL THERAPIST
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
## 2023-01-18 ENCOUNTER — HOSPITAL ENCOUNTER (OUTPATIENT)
Dept: PHYSICAL THERAPY | Facility: CLINIC | Age: 63
Setting detail: THERAPIES SERIES
Discharge: HOME | End: 2023-01-18
Attending: FAMILY MEDICINE
Payer: COMMERCIAL

## 2023-01-18 PROCEDURE — 97014 ELECTRIC STIMULATION THERAPY: CPT | Mod: GP | Performed by: PHYSICAL THERAPIST

## 2023-01-18 PROCEDURE — 97010 HOT OR COLD PACKS THERAPY: CPT | Mod: GP | Performed by: PHYSICAL THERAPIST

## 2023-01-18 PROCEDURE — 97110 THERAPEUTIC EXERCISES: CPT | Mod: GP | Performed by: PHYSICAL THERAPIST

## 2023-01-18 NOTE — PROGRESS NOTES
[facility]    Outpatient Physical Therapy Discharge Note  Patient: Galdino Nelson  : 1960    Beginning/End Dates of Reporting Period:  11/15/2022 to 2023    Referring Provider: leonid saenz MD    Therapy Diagnosis: neck and back pain      Client Self Report: returns from doctor with good report is returning to work next , feels he will do well    Objective Measurements:        Objective Measure: GERMAINE at eval 33.33 currently 15.56  Details: at eval Headache is a 5/10 typical and 8/10 at worst, the neck pain is a 5/10 typical and 6/10 at worst and the thoracic pain is a 6/10 typial and 8/10 at worst.  Objective Measure: currently pain neck pain 0-3/10 mid back pain 0-5/10 low back 0-3/10 pain is 75% better , overall pain is improved still has pain when he drives along time will be stiff when he gets out , overall has return to prior of level of function      patient seen for 7 Rx sessions for exercises and HEP and in clinic also completed hot pack with IFC to neck and low back at last Rx he was completing the following green theraband on doorknob B shoulder extension 30 reps 2x day  , supine bent knee trunk rotation , SKC , SLR , hip abduction and prone knee flexion 10x 2 , continue with seated cervical retraction 10 reps 2x day, added prone lay 15 minutes 3 x day, in clinic nustep x 15 level 1     Goals:  Goal Identifier Symptoms   Goal Description Galdino will use strategies learned in P.T. to decrease pain levels to help reach personal goal of symptom relief.   Target Date 12/15/22   Date Met      Progress (detail required for progress note):       Goal Identifier Sitting tolerance   Goal Description Galdino will sit for 4 hours without increasing back pain. Which is needed for his job duties.   Target Date 23   Date Met      Progress (detail required for progress note): is able to sit for 4  hours but is then very stiff when he moves     Goal Identifier Function   Goal Description Galdino will improve his GERMAINE by 30% indicating improved overall function.   Target Date 02/13/23   Date Met      Progress (detail required for progress note): GERMAINE at eval 33.33 currently 15.56       Plan:  Discharge from therapy.    Discharge:    Reason for Discharge: Patient has met all goals.    Equipment Issued: none    Discharge Plan: Patient to continue home program.

## 2023-04-02 ENCOUNTER — HEALTH MAINTENANCE LETTER (OUTPATIENT)
Age: 63
End: 2023-04-02

## 2023-07-22 ENCOUNTER — NURSE TRIAGE (OUTPATIENT)
Dept: NURSING | Facility: CLINIC | Age: 63
End: 2023-07-22
Payer: MEDICAID

## 2023-07-23 NOTE — TELEPHONE ENCOUNTER
Patient is calling to report symptomatic hyperglycemia.    He came home this afternoon with BG of over 600.  He has an insulin pump and was recommended to give 13u.  Checked it a couple hours later and it was still at 500.  Gave another 8u per pump.  Checked it again and it was 300.  Gave another 7u per pump.    He feels very dizzy and has difficulty breathing.  BP was 114/76 - which he states is low for him.  He hasn't taken his metoprolol and lisinopril in a couple of weeks.    Disposition:  Advised patient to be seen in the ED now. Care advice given.  Pt verbalized understanding.    Jovana Ochoa, RN, BSN Nurse Triage Advisor 7/22/2023 11:20 PM       Reason for Disposition    [1] Blood glucose > 240 mg/dL (13.3 mmol/L) AND [2] rapid breathing    Additional Information    Negative: Unconscious or difficult to awaken    Negative: Acting confused (e.g., disoriented, slurred speech)    Negative: Very weak (e.g., can't stand)    Negative: Sounds like a life-threatening emergency to the triager    Negative: [1] Vomiting AND [2] signs of dehydration (e.g., very dry mouth, lightheaded, dark urine)    Protocols used: DIABETES - HIGH BLOOD SUGAR-A-

## 2023-08-27 ENCOUNTER — HEALTH MAINTENANCE LETTER (OUTPATIENT)
Age: 63
End: 2023-08-27

## 2023-09-27 ENCOUNTER — APPOINTMENT (OUTPATIENT)
Dept: GENERAL RADIOLOGY | Facility: CLINIC | Age: 63
End: 2023-09-27
Attending: FAMILY MEDICINE
Payer: MEDICAID

## 2023-09-27 ENCOUNTER — HOSPITAL ENCOUNTER (EMERGENCY)
Facility: CLINIC | Age: 63
Discharge: HOME | End: 2023-09-28
Attending: FAMILY MEDICINE | Admitting: FAMILY MEDICINE
Payer: MEDICAID

## 2023-09-27 DIAGNOSIS — J44.1 COPD EXACERBATION (H): ICD-10-CM

## 2023-09-27 DIAGNOSIS — E11.65 HYPERGLYCEMIA DUE TO DIABETES MELLITUS (H): ICD-10-CM

## 2023-09-27 LAB
ALBUMIN SERPL BCG-MCNC: 4.4 G/DL (ref 3.5–5.2)
ALP SERPL-CCNC: 114 U/L (ref 40–129)
ALT SERPL W P-5'-P-CCNC: 13 U/L (ref 0–70)
ANION GAP SERPL CALCULATED.3IONS-SCNC: 14 MMOL/L (ref 7–15)
AST SERPL W P-5'-P-CCNC: 16 U/L (ref 0–45)
BASE EXCESS BLDV CALC-SCNC: -1 MMOL/L (ref -7.7–1.9)
BASOPHILS # BLD AUTO: 0.1 10E3/UL (ref 0–0.2)
BASOPHILS NFR BLD AUTO: 1 %
BILIRUB SERPL-MCNC: 0.7 MG/DL
BUN SERPL-MCNC: 16.8 MG/DL (ref 8–23)
CALCIUM SERPL-MCNC: 9.6 MG/DL (ref 8.8–10.2)
CHLORIDE SERPL-SCNC: 97 MMOL/L (ref 98–107)
CREAT SERPL-MCNC: 0.95 MG/DL (ref 0.67–1.17)
D DIMER PPP FEU-MCNC: 0.28 UG/ML FEU (ref 0–0.5)
DEPRECATED HCO3 PLAS-SCNC: 22 MMOL/L (ref 22–29)
EGFRCR SERPLBLD CKD-EPI 2021: 90 ML/MIN/1.73M2
EOSINOPHIL # BLD AUTO: 0.2 10E3/UL (ref 0–0.7)
EOSINOPHIL NFR BLD AUTO: 3 %
ERYTHROCYTE [DISTWIDTH] IN BLOOD BY AUTOMATED COUNT: 12.9 % (ref 10–15)
GLUCOSE SERPL-MCNC: 419 MG/DL (ref 70–99)
HCO3 BLDV-SCNC: 26 MMOL/L (ref 21–28)
HCT VFR BLD AUTO: 46.3 % (ref 40–53)
HGB BLD-MCNC: 16.2 G/DL (ref 13.3–17.7)
IMM GRANULOCYTES # BLD: 0 10E3/UL
IMM GRANULOCYTES NFR BLD: 0 %
LYMPHOCYTES # BLD AUTO: 2.2 10E3/UL (ref 0.8–5.3)
LYMPHOCYTES NFR BLD AUTO: 28 %
MCH RBC QN AUTO: 30.3 PG (ref 26.5–33)
MCHC RBC AUTO-ENTMCNC: 35 G/DL (ref 31.5–36.5)
MCV RBC AUTO: 87 FL (ref 78–100)
MONOCYTES # BLD AUTO: 0.5 10E3/UL (ref 0–1.3)
MONOCYTES NFR BLD AUTO: 7 %
NEUTROPHILS # BLD AUTO: 4.9 10E3/UL (ref 1.6–8.3)
NEUTROPHILS NFR BLD AUTO: 61 %
NRBC # BLD AUTO: 0 10E3/UL
NRBC BLD AUTO-RTO: 0 /100
NT-PROBNP SERPL-MCNC: 50 PG/ML (ref 0–900)
O2/TOTAL GAS SETTING VFR VENT: 21 %
PCO2 BLDV: 49 MM HG (ref 40–50)
PH BLDV: 7.33 [PH] (ref 7.32–7.43)
PLATELET # BLD AUTO: 185 10E3/UL (ref 150–450)
PO2 BLDV: 24 MM HG (ref 25–47)
POTASSIUM SERPL-SCNC: 3.9 MMOL/L (ref 3.4–5.3)
PROT SERPL-MCNC: 7 G/DL (ref 6.4–8.3)
RBC # BLD AUTO: 5.34 10E6/UL (ref 4.4–5.9)
SODIUM SERPL-SCNC: 133 MMOL/L (ref 135–145)
TROPONIN T SERPL HS-MCNC: 8 NG/L
WBC # BLD AUTO: 7.9 10E3/UL (ref 4–11)

## 2023-09-27 PROCEDURE — 93005 ELECTROCARDIOGRAM TRACING: CPT | Performed by: FAMILY MEDICINE

## 2023-09-27 PROCEDURE — 93010 ELECTROCARDIOGRAM REPORT: CPT | Performed by: FAMILY MEDICINE

## 2023-09-27 PROCEDURE — 99284 EMERGENCY DEPT VISIT MOD MDM: CPT | Mod: 25 | Performed by: FAMILY MEDICINE

## 2023-09-27 PROCEDURE — 85025 COMPLETE CBC W/AUTO DIFF WBC: CPT | Performed by: FAMILY MEDICINE

## 2023-09-27 PROCEDURE — 99285 EMERGENCY DEPT VISIT HI MDM: CPT | Mod: 25 | Performed by: FAMILY MEDICINE

## 2023-09-27 PROCEDURE — 83880 ASSAY OF NATRIURETIC PEPTIDE: CPT | Performed by: FAMILY MEDICINE

## 2023-09-27 PROCEDURE — 82803 BLOOD GASES ANY COMBINATION: CPT | Performed by: FAMILY MEDICINE

## 2023-09-27 PROCEDURE — 71045 X-RAY EXAM CHEST 1 VIEW: CPT

## 2023-09-27 PROCEDURE — 84484 ASSAY OF TROPONIN QUANT: CPT | Performed by: FAMILY MEDICINE

## 2023-09-27 PROCEDURE — 94640 AIRWAY INHALATION TREATMENT: CPT | Performed by: FAMILY MEDICINE

## 2023-09-27 PROCEDURE — 36415 COLL VENOUS BLD VENIPUNCTURE: CPT | Performed by: FAMILY MEDICINE

## 2023-09-27 PROCEDURE — 250N000009 HC RX 250: Performed by: FAMILY MEDICINE

## 2023-09-27 PROCEDURE — 80053 COMPREHEN METABOLIC PANEL: CPT | Performed by: FAMILY MEDICINE

## 2023-09-27 PROCEDURE — 85379 FIBRIN DEGRADATION QUANT: CPT | Performed by: FAMILY MEDICINE

## 2023-09-27 RX ORDER — IPRATROPIUM BROMIDE AND ALBUTEROL SULFATE 2.5; .5 MG/3ML; MG/3ML
3 SOLUTION RESPIRATORY (INHALATION)
Status: DISCONTINUED | OUTPATIENT
Start: 2023-09-27 | End: 2023-09-28 | Stop reason: HOSPADM

## 2023-09-27 RX ADMIN — IPRATROPIUM BROMIDE AND ALBUTEROL SULFATE 3 ML: .5; 3 SOLUTION RESPIRATORY (INHALATION) at 23:06

## 2023-09-27 ASSESSMENT — ACTIVITIES OF DAILY LIVING (ADL): ADLS_ACUITY_SCORE: 35

## 2023-09-28 VITALS
RESPIRATION RATE: 18 BRPM | BODY MASS INDEX: 27.89 KG/M2 | WEIGHT: 200 LBS | OXYGEN SATURATION: 98 % | TEMPERATURE: 97.7 F | DIASTOLIC BLOOD PRESSURE: 80 MMHG | HEART RATE: 90 BPM | SYSTOLIC BLOOD PRESSURE: 133 MMHG

## 2023-09-28 PROCEDURE — 250N000013 HC RX MED GY IP 250 OP 250 PS 637: Performed by: FAMILY MEDICINE

## 2023-09-28 PROCEDURE — 250N000009 HC RX 250: Performed by: FAMILY MEDICINE

## 2023-09-28 RX ORDER — AZITHROMYCIN 250 MG/1
250 TABLET, FILM COATED ORAL DAILY
Qty: 4 TABLET | Refills: 0 | Status: ON HOLD | OUTPATIENT
Start: 2023-09-28 | End: 2023-10-06

## 2023-09-28 RX ORDER — AZITHROMYCIN 250 MG/1
500 TABLET, FILM COATED ORAL ONCE
Status: COMPLETED | OUTPATIENT
Start: 2023-09-28 | End: 2023-09-28

## 2023-09-28 RX ORDER — IPRATROPIUM BROMIDE AND ALBUTEROL SULFATE 2.5; .5 MG/3ML; MG/3ML
3 SOLUTION RESPIRATORY (INHALATION) ONCE
Status: COMPLETED | OUTPATIENT
Start: 2023-09-28 | End: 2023-09-28

## 2023-09-28 RX ORDER — IPRATROPIUM BROMIDE AND ALBUTEROL SULFATE 2.5; .5 MG/3ML; MG/3ML
1 SOLUTION RESPIRATORY (INHALATION) EVERY 6 HOURS PRN
Qty: 90 ML | Refills: 0 | Status: SHIPPED | OUTPATIENT
Start: 2023-09-28 | End: 2024-07-31

## 2023-09-28 RX ADMIN — AZITHROMYCIN 500 MG: 250 TABLET, FILM COATED ORAL at 00:19

## 2023-09-28 RX ADMIN — IPRATROPIUM BROMIDE AND ALBUTEROL SULFATE 3 ML: 2.5; .5 SOLUTION RESPIRATORY (INHALATION) at 00:14

## 2023-09-28 NOTE — ED PROVIDER NOTES
[facility] ED Provider Note   Patient: Galdino Nelson  MRN #:  [id number]  Date of Visit: September 27, 2023    CC:     Chief Complaint   Patient presents with    Shortness of Breath     HPI:  Galdino Nelson is a 63 year old male who presented to the emergency department with 1 week history of increasing cough, with worsening symptoms today with shortness of breath, cough that is productive of green sputum, and high blood sugars.  Patient is a diabetic on insulin.  He typically maintains his blood sugars in the range of 150 or better.  Today it has been as high as 500 and his last blood sugar was 400 despite making some corrections.  He is a smoker of 1 pack/day for approximately 50 years.  He had been diagnosed with COPD with PFTs done in July 2018 showing moderate obstruction.  Patient denies any fever, chills, nausea, vomiting.  He has had diarrhea today.  His symptoms are concerning for pneumonia and he is open to the possibility that this is COPD.  He had been given an inhaler but he had never experienced any relief in the past.  Patient was dropped off by his son.    Problem List:  Patient Active Problem List    Diagnosis Date Noted    Insulin pump status 01/24/2022     Priority: Medium     Formatting of this note might be different from the original.  770 G- 1/21/2022      Diabetes mellitus, type II (H) 09/29/2019     Priority: Medium    CAD in native artery 09/26/2018     Priority: Medium     Formatting of this note might be different from the original.  Pt reports Hx of MI in 2002- No stents.   Stress Test 2009- No ischemia.   On metoprolol, atorvastatin, recommend baby ASA daily.      Hyperlipidemia 09/26/2018     Priority: Medium     Formatting of this note might be different from the original.  On Atorvastatin.      Chronic obstructive pulmonary disease (H) 11/18/2011     Priority: Medium     Formatting of this note might be  different from the original.  PFT's done 7/2018- showing moderate COPD with positive bronchodilator response  Smoking Cessation Advised.   No on any daily medications- No frequent exacerbations/hospitalizations.      Migraine 11/18/2011     Priority: Medium    CARDIOVASCULAR SCREENING; LDL GOAL LESS THAN 160 10/31/2010     Priority: Medium    Tobacco use disorder 08/10/2006     Priority: Medium     Formatting of this note might be different from the original.  Started on Chantix 8/2018. Working on quitting on 10/6/2018. History of smoking for 45 years, 1 PPD.      Essential hypertension 01/12/2005     Priority: Medium     Formatting of this note might be different from the original.  Updated by system to replace inactive record  Formatting of this note might be different from the original.  Epic         Past Medical History:   Diagnosis Date    Chronic obstructive pulmonary disease (H) 11/18/2011       MEDS: azithromycin (ZITHROMAX) 250 MG tablet  ipratropium - albuterol 0.5 mg/2.5 mg/3 mL (DUONEB) 0.5-2.5 (3) MG/3ML neb solution  amoxicillin (AMOXIL) 500 MG capsule  atorvastatin (LIPITOR) 40 MG tablet  blood glucose (ACCU-CHEK GUIDE) test strip  Continuous Blood Gluc  (FREESTYLE KATHYA 14 DAY READER) CHARLA  insulin lispro (HUMALOG VIAL) 100 UNIT/ML vial  losartan-hydrochlorothiazide (HYZAAR) 50-12.5 MG tablet  methocarbamol (ROBAXIN) 750 MG tablet  metoprolol succinate ER (TOPROL XL) 50 MG 24 hr tablet  PARoxetine (PAXIL) 30 MG tablet  semaglutide (OZEMPIC, 0.25 OR 0.5 MG/DOSE,) 2 MG/1.5ML SOPN pen        ALLERGIES:  No Known Allergies    History reviewed. No pertinent surgical history.    Social History     Tobacco Use    Smoking status: Every Day     Packs/day: 1.00     Types: Cigarettes    Smokeless tobacco: Never   Substance Use Topics    Alcohol use: Not Currently         Review of Systems   Except as noted in HPI, all other systems were reviewed and are negative    Physical Exam   Vitals were  reviewed  Patient Vitals for the past 12 hrs:   BP Temp Temp src Pulse Resp SpO2 Weight   09/28/23 0010 -- -- -- -- -- 98 % --   09/28/23 0000 133/80 -- -- 90 -- 98 % --   09/27/23 2350 -- -- -- -- -- 99 % --   09/27/23 2340 -- -- -- -- -- 98 % --   09/27/23 2330 (!) 146/96 -- -- 86 -- 98 % --   09/27/23 2300 (!) 140/99 -- -- 76 -- 100 % --   09/27/23 2234 (!) 146/97 97.7  F (36.5  C) Oral 71 18 99 % 90.7 kg (200 lb)     GENERAL APPEARANCE: Alert and oriented x3, no respiratory distress at rest  FACE: normal facies  EYES: Pupils are equal  HENT: normal external exam  NECK: no adenopathy or asymmetry  RESP: Patient is taking shallow inspirations to avoid triggering the cough.  No adventitious breath sounds with shallow breathing.  CV: regular rate and rhythm; no significant murmurs, gallops or rubs  ABD: soft, no tenderness; no rebound or guarding; bowel sounds are normal  MS: no gross deformities noted; normal muscle tone.  EXT: No calf tenderness or pitting edema  SKIN: no worrisome rash  NEURO: no facial droop; no focal deficits, speech is in near full sentences.        Available Lab/Imaging Results     Results for orders placed or performed during the hospital encounter of 09/27/23 (from the past 24 hour(s))   CBC with platelets differential    Narrative    The following orders were created for panel order CBC with platelets differential.  Procedure                               Abnormality         Status                     ---------                               -----------         ------                     CBC with platelets and d...[[id number]]                      Final result                 Please view results for these tests on the individual orders.   Comprehensive metabolic panel   Result Value Ref Range    Sodium 133 (L) 135 - 145 mmol/L    Potassium 3.9 3.4 - 5.3 mmol/L    Carbon Dioxide (CO2) 22 22 - 29 mmol/L    Anion Gap 14 7 - 15 mmol/L    Urea Nitrogen 16.8 8.0 - 23.0 mg/dL    Creatinine 0.95  0.67 - 1.17 mg/dL    GFR Estimate 90 >60 mL/min/1.73m2    Calcium 9.6 8.8 - 10.2 mg/dL    Chloride 97 (L) 98 - 107 mmol/L    Glucose 419 (H) 70 - 99 mg/dL    Alkaline Phosphatase 114 40 - 129 U/L    AST 16 0 - 45 U/L    ALT 13 0 - 70 U/L    Protein Total 7.0 6.4 - 8.3 g/dL    Albumin 4.4 3.5 - 5.2 g/dL    Bilirubin Total 0.7 <=1.2 mg/dL   Troponin T, High Sensitivity   Result Value Ref Range    Troponin T, High Sensitivity 8 <=22 ng/L   Blood gas venous   Result Value Ref Range    pH Venous 7.33 7.32 - 7.43    pCO2 Venous 49 40 - 50 mm Hg    pO2 Venous 24 (L) 25 - 47 mm Hg    Bicarbonate Venous 26 21 - 28 mmol/L    Base Excess/Deficit -1.0 -7.7 - 1.9 mmol/L    FIO2 21    Nt probnp inpatient (BNP)   Result Value Ref Range    N terminal Pro BNP Inpatient 50 0 - 900 pg/mL   D dimer quantitative   Result Value Ref Range    D-Dimer Quantitative 0.28 0.00 - 0.50 ug/mL FEU    Narrative    This D-dimer assay is intended for use in conjunction with a clinical pretest probability assessment model to exclude pulmonary embolism (PE) and deep venous thrombosis (DVT) in outpatients suspected of PE or DVT. The cut-off value is 0.50 ug/mL FEU.    For patients 50 years of age or older, the application of age-adjusted cut-off values for D-Dimer may increase the specificity without significant effect on sensitivity. The literature suggested calculation age adjusted cut-off in ug/L = age in years x 10 ug/L. The results in this laboratory are reported as ug/mL rather than ug/L. The calculation for age adjusted cut off in ug/mL= age in years x 0.01 ug/mL. For example, the cut off for a 76 year old male is 76 x 0.01 ug/mL = 0.76 ug/mL (760 ug/L).    M Tal et al. Age adjusted D-dimer cut-off levels to rule out pulmonary embolism: The ADJUST-PE Study. PATSY 2014;311:1450-2553.; HJ Travis et al. Diagnostic accuracy of conventional or age adjusted D-dimer cutoff values in older patients with suspected venous thromboembolism. Systemic  review and meta-analysis. BMJ 2013:346:f2492.   CBC with platelets and differential   Result Value Ref Range    WBC Count 7.9 4.0 - 11.0 10e3/uL    RBC Count 5.34 4.40 - 5.90 10e6/uL    Hemoglobin 16.2 13.3 - 17.7 g/dL    Hematocrit 46.3 40.0 - 53.0 %    MCV 87 78 - 100 fL    MCH 30.3 26.5 - 33.0 pg    MCHC 35.0 31.5 - 36.5 g/dL    RDW 12.9 10.0 - 15.0 %    Platelet Count 185 150 - 450 10e3/uL    % Neutrophils 61 %    % Lymphocytes 28 %    % Monocytes 7 %    % Eosinophils 3 %    % Basophils 1 %    % Immature Granulocytes 0 %    NRBCs per 100 WBC 0 <1 /100    Absolute Neutrophils 4.9 1.6 - 8.3 10e3/uL    Absolute Lymphocytes 2.2 0.8 - 5.3 10e3/uL    Absolute Monocytes 0.5 0.0 - 1.3 10e3/uL    Absolute Eosinophils 0.2 0.0 - 0.7 10e3/uL    Absolute Basophils 0.1 0.0 - 0.2 10e3/uL    Absolute Immature Granulocytes 0.0 <=0.4 10e3/uL    Absolute NRBCs 0.0 10e3/uL   XR Chest Port 1 View    Narrative    EXAM: XR CHEST PORT 1 VIEW  LOCATION: [facility] [place]  DATE: 9/27/2023    INDICATION: Chest pressure, shortness of breath  COMPARISON: CT PE chest 06/14/2022      Impression    IMPRESSION: No focal pulmonary consolidation or effusion. Normal heart size without pulmonary vascular congestion. No pneumothorax. Slightly tortuous thoracic aorta. Multilevel degenerative changes of the thoracic spine. No acute osseous abnormality.       EKG reviewed by me: Sinus rhythm with occasional PAC.  Heart rate of 69.  Normal FL, QT and QRS intervals.  Left axis.  There is possible inferior apical infarct.  No acute ST-T wave changes.  Compared with previous EKG from May 2016, no new definite infarct.         Impression     Final diagnoses:   COPD exacerbation (H)   Hyperglycemia due to diabetes mellitus (H)         ED Course & Medical Decision Making   Galdino Nelson is a 63 year old male who presented to the emergency department with acute shortness of breath associated with significant amount of sputum  production that is described as green.  He has had symptoms for the past week, escalating today.  Patient has a history of COPD and continues to smoke a pack per day.  He does not have any fever, chills, nausea or vomiting.  He has had diarrhea.  He has slight chest discomfort but not pain and he does not have any calf pain or ankle swelling.    Vital signs reveal a temp of 97.7, blood pressure 146/97, heart rate of 71, respiratory rate of 18 with 99% oxygen saturation.  On exam, patient is taking shallow breaths and adventitious breath sounds are not appreciable.  With a deep breath, this is triggering his cough.  Heart sounds are normal.  No calf pain or ankle swelling.    Patient's work-up reveals a normal CBC with white blood count of 7.9, hemoglobin of 16.2, platelet count of 185 with 61% neutrophils.  Comprehensive metabolic panel reveals a sodium of 133, potassium 3.9, BUN of 16.8, creatinine of 0.95.  Serum glucose is 419, normal liver enzymes.  Troponin is 8.  Venous blood gas reveals a pH of 7.33, PCO2 of 49, PO2 of 24.  BNP is 50, D-dimer 0.28.  Chest x-ray reveals no focal pulmonary consolidation or effusions.  Normal heart size without pulmonary vascular congestion.    Patient received a DuoNeb with some improvement in his coughing and shortness of breath.  Patient remained stable with oxygen saturations of 98%.  Patient was able to take davalos breath.  We discussed the likelihood that he has a COPD exacerbation.  This is typically treated with nebulization treatments, antibiotics, and steroids.  Given that he has severe hyperglycemia, we will hold off on the steroids until he can get this under control.  Over the next 2 to 3 days had like him to start DuoNebs every 4 hours while awake, and continue with Zithromax.  He received his first dose in the emergency department and will complete 4 more days of antibiotics.  Patient has an oximeter that he can check at home.  It will be important for him to  continue with smoking cessation.  He states he has tried everything except for hypnosis.  He had been on Chantix which helped but then he stopped too soon and ended up smoking again.  Patient was instructed to return to the emergency department at any time if his symptoms worsen.  We will attempt outpatient management of his COPD exacerbation.          Written after-visit summary and instructions were given at the time of discharge.        Discharge Instructions:   You have a COPD exacerbation.  Please see the attached handout.  Your chest x-ray does not reveal any signs of pneumonia at this time.  Your blood sugars are also extremely high and will need correction.  Use DuoNebs every 4 hours as needed for the next several days.  Continue Zithromax 250 mg daily for 4 more days.  Your next dose will be due tomorrow evening.  Follow-up with your primary care provider in 2-3 days if not improving.  Return to the emergency department at any time if your symptoms worsen.  Also if you notice that your oxygen saturations are below 90 consistently you should return.       Disclaimer: This note consists of words and symbols derived from keyboarding and dictation using voice recognition software.  As a result, there may be errors that have gone undetected.  Please consider this when interpreting information found in this note.       Saravanan Kasper MD  09/28/23 0121       Saravanan Kasper MD  10/02/23 1612

## 2023-09-28 NOTE — ED TRIAGE NOTES
C/o sob worsening last few days - coughing and abd/chest pain discomfort.      Triage Assessment       Row Name 09/27/23 2143       Triage Assessment (Adult)    Airway WDL WDL       Respiratory WDL    Respiratory WDL WDL       Cardiac WDL    Cardiac WDL WDL

## 2023-09-28 NOTE — DISCHARGE INSTRUCTIONS
You have a COPD exacerbation.  Please see the attached handout.  Your chest x-ray does not reveal any signs of pneumonia at this time.  Your blood sugars are also extremely high and will need correction.  Use DuoNebs every 4 hours as needed for the next several days.  Continue Zithromax 250 mg daily for 4 more days.  Your next dose will be due tomorrow evening.  Follow-up with your primary care provider in 2-3 days if not improving.  Return to the emergency department at any time if your symptoms worsen.  Also if you notice that your oxygen saturations are below 90 consistently you should return.

## 2023-10-01 ENCOUNTER — HOSPITAL ENCOUNTER (INPATIENT)
Facility: CLINIC | Age: 63
LOS: 4 days | Discharge: HOME | End: 2023-10-06
Attending: INTERNAL MEDICINE | Admitting: INTERNAL MEDICINE
Payer: MEDICAID

## 2023-10-01 DIAGNOSIS — Z79.4 TYPE 2 DIABETES MELLITUS WITHOUT COMPLICATION, WITH LONG-TERM CURRENT USE OF INSULIN (H): ICD-10-CM

## 2023-10-01 DIAGNOSIS — E11.9 TYPE 2 DIABETES MELLITUS WITHOUT COMPLICATION, WITH LONG-TERM CURRENT USE OF INSULIN (H): ICD-10-CM

## 2023-10-01 DIAGNOSIS — I48.91 ATRIAL FIBRILLATION WITH RVR (H): Primary | ICD-10-CM

## 2023-10-02 ENCOUNTER — APPOINTMENT (OUTPATIENT)
Dept: CARDIOLOGY | Facility: CLINIC | Age: 63
End: 2023-10-02
Attending: INTERNAL MEDICINE
Payer: MEDICAID

## 2023-10-02 PROBLEM — E11.10 DKA (DIABETIC KETOACIDOSIS) (H): Status: ACTIVE | Noted: 2023-10-02

## 2023-10-02 PROBLEM — E87.1 HYPONATREMIA: Status: ACTIVE | Noted: 2023-10-02

## 2023-10-02 PROBLEM — E83.39 HYPOPHOSPHATEMIA: Status: ACTIVE | Noted: 2023-10-02

## 2023-10-02 PROBLEM — R74.8 ELEVATED LIPASE: Status: ACTIVE | Noted: 2023-10-02

## 2023-10-02 PROBLEM — I48.91 ATRIAL FIBRILLATION WITH RVR (H): Status: ACTIVE | Noted: 2023-10-02

## 2023-10-02 PROBLEM — R65.10 SIRS (SYSTEMIC INFLAMMATORY RESPONSE SYNDROME) (H): Status: ACTIVE | Noted: 2023-10-02

## 2023-10-02 PROBLEM — I25.2 OLD MI (MYOCARDIAL INFARCTION): Status: ACTIVE | Noted: 2018-09-26

## 2023-10-02 PROBLEM — R79.89 ELEVATED TROPONIN: Status: ACTIVE | Noted: 2023-10-02

## 2023-10-02 LAB
ALBUMIN SERPL BCG-MCNC: 3.7 G/DL (ref 3.5–5.2)
ALP SERPL-CCNC: 110 U/L (ref 40–129)
ALT SERPL W P-5'-P-CCNC: 11 U/L (ref 0–70)
ANION GAP SERPL CALCULATED.3IONS-SCNC: 11 MMOL/L (ref 7–15)
ANION GAP SERPL CALCULATED.3IONS-SCNC: 13 MMOL/L (ref 7–15)
ANION GAP SERPL CALCULATED.3IONS-SCNC: 14 MMOL/L (ref 7–15)
ANION GAP SERPL CALCULATED.3IONS-SCNC: 14 MMOL/L (ref 7–15)
ANION GAP SERPL CALCULATED.3IONS-SCNC: 17 MMOL/L (ref 7–15)
AST SERPL W P-5'-P-CCNC: 19 U/L (ref 0–45)
B-OH-BUTYR SERPL-SCNC: 0.8 MMOL/L
B-OH-BUTYR SERPL-SCNC: 1.5 MMOL/L
B-OH-BUTYR SERPL-SCNC: 2.9 MMOL/L
BASE EXCESS BLDV CALC-SCNC: -4.6 MMOL/L (ref -7.7–1.9)
BASE EXCESS BLDV CALC-SCNC: -7.3 MMOL/L (ref -7.7–1.9)
BASOPHILS # BLD AUTO: 0.1 10E3/UL (ref 0–0.2)
BASOPHILS NFR BLD AUTO: 1 %
BILIRUB DIRECT SERPL-MCNC: <0.2 MG/DL (ref 0–0.3)
BILIRUB SERPL-MCNC: 0.6 MG/DL
BUN SERPL-MCNC: 17.1 MG/DL (ref 8–23)
BUN SERPL-MCNC: 18.9 MG/DL (ref 8–23)
BUN SERPL-MCNC: 19.6 MG/DL (ref 8–23)
BUN SERPL-MCNC: 21 MG/DL (ref 8–23)
BUN SERPL-MCNC: 21.1 MG/DL (ref 8–23)
CALCIUM SERPL-MCNC: 9.3 MG/DL (ref 8.8–10.2)
CALCIUM SERPL-MCNC: 9.4 MG/DL (ref 8.8–10.2)
CALCIUM SERPL-MCNC: 9.5 MG/DL (ref 8.8–10.2)
CALCIUM SERPL-MCNC: 9.7 MG/DL (ref 8.8–10.2)
CALCIUM SERPL-MCNC: 9.7 MG/DL (ref 8.8–10.2)
CHLORIDE SERPL-SCNC: 103 MMOL/L (ref 98–107)
CHLORIDE SERPL-SCNC: 105 MMOL/L (ref 98–107)
CHLORIDE SERPL-SCNC: 107 MMOL/L (ref 98–107)
CREAT SERPL-MCNC: 0.91 MG/DL (ref 0.67–1.17)
CREAT SERPL-MCNC: 0.99 MG/DL (ref 0.67–1.17)
CREAT SERPL-MCNC: 1.01 MG/DL (ref 0.67–1.17)
CREAT SERPL-MCNC: 1.02 MG/DL (ref 0.67–1.17)
CREAT SERPL-MCNC: 1.05 MG/DL (ref 0.67–1.17)
DEPRECATED HCO3 PLAS-SCNC: 15 MMOL/L (ref 22–29)
DEPRECATED HCO3 PLAS-SCNC: 17 MMOL/L (ref 22–29)
DEPRECATED HCO3 PLAS-SCNC: 20 MMOL/L (ref 22–29)
EGFRCR SERPLBLD CKD-EPI 2021: 80 ML/MIN/1.73M2
EGFRCR SERPLBLD CKD-EPI 2021: 83 ML/MIN/1.73M2
EGFRCR SERPLBLD CKD-EPI 2021: 84 ML/MIN/1.73M2
EGFRCR SERPLBLD CKD-EPI 2021: 86 ML/MIN/1.73M2
EGFRCR SERPLBLD CKD-EPI 2021: >90 ML/MIN/1.73M2
EOSINOPHIL # BLD AUTO: 0.1 10E3/UL (ref 0–0.7)
EOSINOPHIL NFR BLD AUTO: 1 %
ERYTHROCYTE [DISTWIDTH] IN BLOOD BY AUTOMATED COUNT: 12.9 % (ref 10–15)
GLUCOSE BLDC GLUCOMTR-MCNC: 121 MG/DL (ref 70–99)
GLUCOSE BLDC GLUCOMTR-MCNC: 135 MG/DL (ref 70–99)
GLUCOSE BLDC GLUCOMTR-MCNC: 136 MG/DL (ref 70–99)
GLUCOSE BLDC GLUCOMTR-MCNC: 139 MG/DL (ref 70–99)
GLUCOSE BLDC GLUCOMTR-MCNC: 143 MG/DL (ref 70–99)
GLUCOSE BLDC GLUCOMTR-MCNC: 146 MG/DL (ref 70–99)
GLUCOSE BLDC GLUCOMTR-MCNC: 148 MG/DL (ref 70–99)
GLUCOSE BLDC GLUCOMTR-MCNC: 150 MG/DL (ref 70–99)
GLUCOSE BLDC GLUCOMTR-MCNC: 158 MG/DL (ref 70–99)
GLUCOSE BLDC GLUCOMTR-MCNC: 161 MG/DL (ref 70–99)
GLUCOSE BLDC GLUCOMTR-MCNC: 162 MG/DL (ref 70–99)
GLUCOSE BLDC GLUCOMTR-MCNC: 163 MG/DL (ref 70–99)
GLUCOSE BLDC GLUCOMTR-MCNC: 164 MG/DL (ref 70–99)
GLUCOSE BLDC GLUCOMTR-MCNC: 166 MG/DL (ref 70–99)
GLUCOSE BLDC GLUCOMTR-MCNC: 169 MG/DL (ref 70–99)
GLUCOSE BLDC GLUCOMTR-MCNC: 169 MG/DL (ref 70–99)
GLUCOSE BLDC GLUCOMTR-MCNC: 175 MG/DL (ref 70–99)
GLUCOSE BLDC GLUCOMTR-MCNC: 180 MG/DL (ref 70–99)
GLUCOSE BLDC GLUCOMTR-MCNC: 200 MG/DL (ref 70–99)
GLUCOSE BLDC GLUCOMTR-MCNC: 208 MG/DL (ref 70–99)
GLUCOSE BLDC GLUCOMTR-MCNC: 212 MG/DL (ref 70–99)
GLUCOSE BLDC GLUCOMTR-MCNC: 228 MG/DL (ref 70–99)
GLUCOSE SERPL-MCNC: 137 MG/DL (ref 70–99)
GLUCOSE SERPL-MCNC: 157 MG/DL (ref 70–99)
GLUCOSE SERPL-MCNC: 165 MG/DL (ref 70–99)
GLUCOSE SERPL-MCNC: 174 MG/DL (ref 70–99)
GLUCOSE SERPL-MCNC: 225 MG/DL (ref 70–99)
HBA1C MFR BLD: 10.8 %
HCO3 BLDV-SCNC: 20 MMOL/L (ref 21–28)
HCO3 BLDV-SCNC: 22 MMOL/L (ref 21–28)
HCT VFR BLD AUTO: 46.6 % (ref 40–53)
HGB BLD-MCNC: 15.9 G/DL (ref 13.3–17.7)
IMM GRANULOCYTES # BLD: 0.1 10E3/UL
IMM GRANULOCYTES NFR BLD: 0 %
LIPASE SERPL-CCNC: 1364 U/L (ref 13–60)
LVEF ECHO: NORMAL
LYMPHOCYTES # BLD AUTO: 2.3 10E3/UL (ref 0.8–5.3)
LYMPHOCYTES NFR BLD AUTO: 19 %
MAGNESIUM SERPL-MCNC: 1.7 MG/DL (ref 1.7–2.3)
MCH RBC QN AUTO: 29.8 PG (ref 26.5–33)
MCHC RBC AUTO-ENTMCNC: 34.1 G/DL (ref 31.5–36.5)
MCV RBC AUTO: 87 FL (ref 78–100)
MONOCYTES # BLD AUTO: 1.1 10E3/UL (ref 0–1.3)
MONOCYTES NFR BLD AUTO: 9 %
NEUTROPHILS # BLD AUTO: 8.5 10E3/UL (ref 1.6–8.3)
NEUTROPHILS NFR BLD AUTO: 70 %
NRBC # BLD AUTO: 0 10E3/UL
NRBC BLD AUTO-RTO: 0 /100
O2/TOTAL GAS SETTING VFR VENT: 21 %
O2/TOTAL GAS SETTING VFR VENT: 21 %
OSMOLALITY SERPL: 302 MMOL/KG (ref 280–301)
PCO2 BLDV: 44 MM HG (ref 40–50)
PCO2 BLDV: 47 MM HG (ref 40–50)
PH BLDV: 7.24 [PH] (ref 7.32–7.43)
PH BLDV: 7.3 [PH] (ref 7.32–7.43)
PHOSPHATE SERPL-MCNC: 1.4 MG/DL (ref 2.5–4.5)
PHOSPHATE SERPL-MCNC: 1.7 MG/DL (ref 2.5–4.5)
PHOSPHATE SERPL-MCNC: 1.9 MG/DL (ref 2.5–4.5)
PLATELET # BLD AUTO: 199 10E3/UL (ref 150–450)
PO2 BLDV: 28 MM HG (ref 25–47)
PO2 BLDV: 30 MM HG (ref 25–47)
POTASSIUM SERPL-SCNC: 3.3 MMOL/L (ref 3.4–5.3)
POTASSIUM SERPL-SCNC: 3.5 MMOL/L (ref 3.4–5.3)
POTASSIUM SERPL-SCNC: 3.8 MMOL/L (ref 3.4–5.3)
POTASSIUM SERPL-SCNC: 3.8 MMOL/L (ref 3.4–5.3)
POTASSIUM SERPL-SCNC: 4 MMOL/L (ref 3.4–5.3)
PROCALCITONIN SERPL IA-MCNC: 0.13 NG/ML
PROT SERPL-MCNC: 6.3 G/DL (ref 6.4–8.3)
RBC # BLD AUTO: 5.34 10E6/UL (ref 4.4–5.9)
SODIUM SERPL-SCNC: 132 MMOL/L (ref 135–145)
SODIUM SERPL-SCNC: 134 MMOL/L (ref 135–145)
SODIUM SERPL-SCNC: 136 MMOL/L (ref 135–145)
SODIUM SERPL-SCNC: 137 MMOL/L (ref 135–145)
SODIUM SERPL-SCNC: 137 MMOL/L (ref 135–145)
TRIGL SERPL-MCNC: 104 MG/DL
TROPONIN T SERPL HS-MCNC: 31 NG/L
TROPONIN T SERPL HS-MCNC: 32 NG/L
WBC # BLD AUTO: 12.1 10E3/UL (ref 4–11)

## 2023-10-02 PROCEDURE — 250N000011 HC RX IP 250 OP 636: Mod: JZ | Performed by: INTERNAL MEDICINE

## 2023-10-02 PROCEDURE — 36415 COLL VENOUS BLD VENIPUNCTURE: CPT | Performed by: INTERNAL MEDICINE

## 2023-10-02 PROCEDURE — 82803 BLOOD GASES ANY COMBINATION: CPT | Performed by: PEDIATRICS

## 2023-10-02 PROCEDURE — 82248 BILIRUBIN DIRECT: CPT | Performed by: INTERNAL MEDICINE

## 2023-10-02 PROCEDURE — 83930 ASSAY OF BLOOD OSMOLALITY: CPT | Performed by: INTERNAL MEDICINE

## 2023-10-02 PROCEDURE — 250N000013 HC RX MED GY IP 250 OP 250 PS 637: Performed by: INTERNAL MEDICINE

## 2023-10-02 PROCEDURE — 82010 KETONE BODYS QUAN: CPT | Performed by: PEDIATRICS

## 2023-10-02 PROCEDURE — 84100 ASSAY OF PHOSPHORUS: CPT | Performed by: PEDIATRICS

## 2023-10-02 PROCEDURE — 83690 ASSAY OF LIPASE: CPT | Performed by: INTERNAL MEDICINE

## 2023-10-02 PROCEDURE — 999N000208 ECHOCARDIOGRAM COMPLETE

## 2023-10-02 PROCEDURE — 255N000002 HC RX 255 OP 636: Performed by: INTERNAL MEDICINE

## 2023-10-02 PROCEDURE — 250N000009 HC RX 250: Performed by: INTERNAL MEDICINE

## 2023-10-02 PROCEDURE — 36415 COLL VENOUS BLD VENIPUNCTURE: CPT | Performed by: PEDIATRICS

## 2023-10-02 PROCEDURE — 99223 1ST HOSP IP/OBS HIGH 75: CPT | Mod: 95 | Performed by: INTERNAL MEDICINE

## 2023-10-02 PROCEDURE — 94640 AIRWAY INHALATION TREATMENT: CPT

## 2023-10-02 PROCEDURE — 258N000003 HC RX IP 258 OP 636: Performed by: INTERNAL MEDICINE

## 2023-10-02 PROCEDURE — 250N000009 HC RX 250: Performed by: PEDIATRICS

## 2023-10-02 PROCEDURE — 80048 BASIC METABOLIC PNL TOTAL CA: CPT | Performed by: PEDIATRICS

## 2023-10-02 PROCEDURE — 82010 KETONE BODYS QUAN: CPT | Performed by: INTERNAL MEDICINE

## 2023-10-02 PROCEDURE — 82962 GLUCOSE BLOOD TEST: CPT

## 2023-10-02 PROCEDURE — 94640 AIRWAY INHALATION TREATMENT: CPT | Mod: 76

## 2023-10-02 PROCEDURE — 258N000003 HC RX IP 258 OP 636: Performed by: PEDIATRICS

## 2023-10-02 PROCEDURE — 84484 ASSAY OF TROPONIN QUANT: CPT | Performed by: INTERNAL MEDICINE

## 2023-10-02 PROCEDURE — 93306 TTE W/DOPPLER COMPLETE: CPT | Mod: 26 | Performed by: INTERNAL MEDICINE

## 2023-10-02 PROCEDURE — 83036 HEMOGLOBIN GLYCOSYLATED A1C: CPT | Performed by: INTERNAL MEDICINE

## 2023-10-02 PROCEDURE — 85025 COMPLETE CBC W/AUTO DIFF WBC: CPT | Performed by: INTERNAL MEDICINE

## 2023-10-02 PROCEDURE — 84100 ASSAY OF PHOSPHORUS: CPT | Performed by: INTERNAL MEDICINE

## 2023-10-02 PROCEDURE — 80053 COMPREHEN METABOLIC PANEL: CPT | Performed by: INTERNAL MEDICINE

## 2023-10-02 PROCEDURE — 84145 PROCALCITONIN (PCT): CPT | Performed by: PEDIATRICS

## 2023-10-02 PROCEDURE — 250N000013 HC RX MED GY IP 250 OP 250 PS 637: Performed by: PEDIATRICS

## 2023-10-02 PROCEDURE — 83735 ASSAY OF MAGNESIUM: CPT | Performed by: INTERNAL MEDICINE

## 2023-10-02 PROCEDURE — 200N000001 HC R&B ICU

## 2023-10-02 PROCEDURE — 87040 BLOOD CULTURE FOR BACTERIA: CPT | Performed by: INTERNAL MEDICINE

## 2023-10-02 PROCEDURE — 80048 BASIC METABOLIC PNL TOTAL CA: CPT | Performed by: INTERNAL MEDICINE

## 2023-10-02 PROCEDURE — 84478 ASSAY OF TRIGLYCERIDES: CPT | Performed by: PEDIATRICS

## 2023-10-02 RX ORDER — POTASSIUM CHLORIDE 1500 MG/1
40 TABLET, EXTENDED RELEASE ORAL ONCE
Status: COMPLETED | OUTPATIENT
Start: 2023-10-02 | End: 2023-10-02

## 2023-10-02 RX ORDER — METOPROLOL TARTRATE 1 MG/ML
5 INJECTION, SOLUTION INTRAVENOUS EVERY 6 HOURS PRN
Status: DISCONTINUED | OUTPATIENT
Start: 2023-10-02 | End: 2023-10-06 | Stop reason: HOSPADM

## 2023-10-02 RX ORDER — DEXTROSE MONOHYDRATE, SODIUM CHLORIDE, AND POTASSIUM CHLORIDE 50; 1.49; 4.5 G/1000ML; G/1000ML; G/1000ML
INJECTION, SOLUTION INTRAVENOUS CONTINUOUS
Status: DISCONTINUED | OUTPATIENT
Start: 2023-10-02 | End: 2023-10-03 | Stop reason: DRUGHIGH

## 2023-10-02 RX ORDER — METOPROLOL SUCCINATE 50 MG/1
50 TABLET, EXTENDED RELEASE ORAL DAILY
Status: DISCONTINUED | OUTPATIENT
Start: 2023-10-02 | End: 2023-10-06 | Stop reason: HOSPADM

## 2023-10-02 RX ORDER — IPRATROPIUM BROMIDE AND ALBUTEROL SULFATE 2.5; .5 MG/3ML; MG/3ML
1 SOLUTION RESPIRATORY (INHALATION) EVERY 6 HOURS PRN
Status: DISCONTINUED | OUTPATIENT
Start: 2023-10-02 | End: 2023-10-06 | Stop reason: HOSPADM

## 2023-10-02 RX ORDER — NICOTINE POLACRILEX 4 MG
15-30 LOZENGE BUCCAL
Status: DISCONTINUED | OUTPATIENT
Start: 2023-10-02 | End: 2023-10-06 | Stop reason: HOSPADM

## 2023-10-02 RX ORDER — IPRATROPIUM BROMIDE AND ALBUTEROL SULFATE 2.5; .5 MG/3ML; MG/3ML
3 SOLUTION RESPIRATORY (INHALATION)
Status: DISCONTINUED | OUTPATIENT
Start: 2023-10-02 | End: 2023-10-04

## 2023-10-02 RX ORDER — DEXTROSE MONOHYDRATE 25 G/50ML
25-50 INJECTION, SOLUTION INTRAVENOUS
Status: DISCONTINUED | OUTPATIENT
Start: 2023-10-02 | End: 2023-10-06 | Stop reason: HOSPADM

## 2023-10-02 RX ORDER — ENOXAPARIN SODIUM 100 MG/ML
40 INJECTION SUBCUTANEOUS EVERY 24 HOURS
Status: DISCONTINUED | OUTPATIENT
Start: 2023-10-02 | End: 2023-10-06 | Stop reason: HOSPADM

## 2023-10-02 RX ADMIN — POTASSIUM CHLORIDE, DEXTROSE MONOHYDRATE AND SODIUM CHLORIDE: 150; 5; 450 INJECTION, SOLUTION INTRAVENOUS at 21:10

## 2023-10-02 RX ADMIN — POTASSIUM CHLORIDE 40 MEQ: 1500 TABLET, EXTENDED RELEASE ORAL at 20:13

## 2023-10-02 RX ADMIN — SODIUM PHOSPHATE, MONOBASIC, MONOHYDRATE AND SODIUM PHOSPHATE, DIBASIC, ANHYDROUS 9 MMOL: 142; 276 INJECTION, SOLUTION INTRAVENOUS at 23:40

## 2023-10-02 RX ADMIN — POTASSIUM CHLORIDE, DEXTROSE MONOHYDRATE AND SODIUM CHLORIDE: 150; 5; 450 INJECTION, SOLUTION INTRAVENOUS at 13:16

## 2023-10-02 RX ADMIN — METOPROLOL SUCCINATE 50 MG: 50 TABLET, EXTENDED RELEASE ORAL at 09:11

## 2023-10-02 RX ADMIN — SODIUM PHOSPHATE, MONOBASIC, MONOHYDRATE AND SODIUM PHOSPHATE, DIBASIC, ANHYDROUS 9 MMOL: 142; 276 INJECTION, SOLUTION INTRAVENOUS at 16:11

## 2023-10-02 RX ADMIN — POTASSIUM CHLORIDE, DEXTROSE MONOHYDRATE AND SODIUM CHLORIDE: 150; 5; 450 INJECTION, SOLUTION INTRAVENOUS at 05:27

## 2023-10-02 RX ADMIN — INSULIN HUMAN 1 UNITS/HR: 1 INJECTION, SOLUTION INTRAVENOUS at 03:44

## 2023-10-02 RX ADMIN — INSULIN HUMAN 1 UNITS/HR: 1 INJECTION, SOLUTION INTRAVENOUS at 13:17

## 2023-10-02 RX ADMIN — IPRATROPIUM BROMIDE AND ALBUTEROL SULFATE 3 ML: 2.5; .5 SOLUTION RESPIRATORY (INHALATION) at 20:02

## 2023-10-02 RX ADMIN — POTASSIUM CHLORIDE, DEXTROSE MONOHYDRATE AND SODIUM CHLORIDE: 150; 5; 450 INJECTION, SOLUTION INTRAVENOUS at 03:43

## 2023-10-02 RX ADMIN — IPRATROPIUM BROMIDE AND ALBUTEROL SULFATE 3 ML: 2.5; .5 SOLUTION RESPIRATORY (INHALATION) at 07:47

## 2023-10-02 RX ADMIN — ENOXAPARIN SODIUM 40 MG: 40 INJECTION SUBCUTANEOUS at 09:10

## 2023-10-02 RX ADMIN — HUMAN ALBUMIN MICROSPHERES AND PERFLUTREN 6 ML: 10; .22 INJECTION, SOLUTION INTRAVENOUS at 08:47

## 2023-10-02 RX ADMIN — SODIUM PHOSPHATE, MONOBASIC, MONOHYDRATE AND SODIUM PHOSPHATE, DIBASIC, ANHYDROUS 9 MMOL: 142; 276 INJECTION, SOLUTION INTRAVENOUS at 14:01

## 2023-10-02 RX ADMIN — IPRATROPIUM BROMIDE AND ALBUTEROL SULFATE 3 ML: 2.5; .5 SOLUTION RESPIRATORY (INHALATION) at 16:21

## 2023-10-02 RX ADMIN — PAROXETINE 30 MG: 20 TABLET, FILM COATED ORAL at 09:10

## 2023-10-02 RX ADMIN — IPRATROPIUM BROMIDE AND ALBUTEROL SULFATE 3 ML: 2.5; .5 SOLUTION RESPIRATORY (INHALATION) at 11:34

## 2023-10-02 ASSESSMENT — ACTIVITIES OF DAILY LIVING (ADL)
EQUIPMENT_CURRENTLY_USED_AT_HOME: WALKER, ROLLING
ADLS_ACUITY_SCORE: 20
ADLS_ACUITY_SCORE: 20
TOILETING_ISSUES: NO
ADLS_ACUITY_SCORE: 20
ADLS_ACUITY_SCORE: 20
FALL_HISTORY_WITHIN_LAST_SIX_MONTHS: NO
WALKING_OR_CLIMBING_STAIRS_DIFFICULTY: NO
ADLS_ACUITY_SCORE: 20
VISION_MANAGEMENT: GLASSES
ADLS_ACUITY_SCORE: 20
DIFFICULTY_COMMUNICATING: NO
CONCENTRATING,_REMEMBERING_OR_MAKING_DECISIONS_DIFFICULTY: NO
DRESSING/BATHING_DIFFICULTY: NO
DIFFICULTY_EATING/SWALLOWING: NO
ADLS_ACUITY_SCORE: 20
HEARING_DIFFICULTY_OR_DEAF: NO
WEAR_GLASSES_OR_BLIND: YES
CHANGE_IN_FUNCTIONAL_STATUS_SINCE_ONSET_OF_CURRENT_ILLNESS/INJURY: NO
ADLS_ACUITY_SCORE: 20
DOING_ERRANDS_INDEPENDENTLY_DIFFICULTY: NO

## 2023-10-02 NOTE — PROGRESS NOTES
S- shift note    B- Patient arrived in ED this morning from [place], and was found to be in DKA. Blood sugars where in the 300s and a new onset episode of A-fib.     A- Patient is on an Insulin drip with an associated algorithm. He is currently on algorithm 2 with most recent Blood Sugar of 169 at 1800.Patients IV is patent, clean, dry and intact and currently infusing at 3 units/hr. Vitals are stable with Temp: 97.5  F (36.4  C) Temp src: Oral BP: 108/81 Pulse: 68   Resp: 18 SpO2: 100 % O2 Device: None (Room air) . Phosphorus replacement given via IV. Follow up phosphorus count at 2200. Ketones 2.9 previous 1.5 continue to follow level.  Patient is drowsy and oriented x4. No oxygen needs.    R- Continue hourly blood sugars and correct insulin drip per algorithm and protocol.

## 2023-10-02 NOTE — PROGRESS NOTES
S-(situation): status update    B-(background): DKA    A-(assessment): patient on algorithm 2 this morning. States his insulin pump fell off when he was at the [facility] before arriving to this facility. Remains NPO at this time other than sip of water with meds.     R-(recommendations): will continue on insulin drip per algorithm and protocol.

## 2023-10-02 NOTE — PROGRESS NOTES
formerly [facility]    Medicine Progress Note - Hospitalist Service    Date of Admission:  10/1/2023    Assessment & Plan   63-year-old man with reported history of type 2 diabetes treated chronically with insulin pump, COPD with ongoing tobacco use and recent exacerbation September 27 treated with Zithromax and bronchodilators, hypertension, and apparent history of old MI in 2002 presented to outside ER (in [place]) last night due to cough, weakness, and decreased oral intake.  Upon evaluation in the ER, he was found to be in diabetic ketoacidosis and also had atrial fibrillation with RVR, so hospitalization was advised and he was transferred to this hospital for inpatient admission.    Diabetic ketoacidosis with SIRS  Reported diagnosis type 2 diabetes treated chronically with insulin pump  Presented outside the ER with initial blood sugar 336, venous pH 7.2 with PCO2 28, serum bicarbonate 8 with elevated anion gap 23, and positive serum ketones all concerning for DKA.  ER provider reported that patient's insulin pump was not in place.  In addition to use of insulin pump, he appears to be using Ozempic for treatment of diabetes and normally uses Ozempic on Sundays.  He was treated with fluid resuscitation and initiation of insulin infusion in the ER and transferred to this hospital for admission.  Since admission, blood sugars have improved on continuous insulin infusion.  Metabolic acidosis is improving and anion gap has normalized.  Hemoglobin A1c is 10.8 suggesting inadequate control of diabetes at baseline.  He presented with tachycardia and leukocytosis concerning for SIRS.  He was given 1 dose IV Rocephin in outside ER last night.  He has not had fever, leukocytosis has improved, no infection has been identified and there is low clinical suspicion for infection at this time.  -Continue insulin infusion per DKA protocol  -Continue IV fluids per DKA protocol  -Ordered recheck of  electrolytes, blood gases, and serum ketones and would monitor them closely until ketoacidosis has resolved  -will need to determine whether he will be able to resume use of insulin pump during this hospitalization or transition to subcutaneous insulin during this hospital stay once DKA resolves  -Not continuing Ozempic at this time (normally uses it on Sundays)  -Not continuing antibiotic therapy at this time but will obtain procalcitonin and reassess depending upon those results and clinical course    Paroxysmal narrow complex tachycardia, suspect PSVT  Noted to be tachycardic with heart rate as high as 200 in outside ER last night at which time rapid atrial fibrillation was diagnosed.  However, upon review of a rhythm strip of that tachycardia, it appears to be a regular narrow complex tachycardia at heart rate of about 200 more suspicious for PSVT than atrial fibrillation which is typically irregular.  Subsequent EKGs performed in outside ER last night appear to demonstrate sinus tachycardia with PACs rather than atrial fibrillation.  While starting treatment for DKA in outside ER, heart rate improved to 110-120.  Since admission here, heart rate has remained normal and he has maintained normal sinus rhythm.  Acute electrolyte disturbances could contribute to risk of recurrent cardiac arrhythmia.  Metoprolol appears on his chronic medication list.  -Continue chronic dose of metoprolol  -Continuing cardiac monitoring while in ICU  -Optimize electrolytes including magnesium and potassium    Hyponatremia  Hypophosphatemia  Presented to outside ER with sodium 135 decreasing to 132 at admission but now improved this morning to 137.  Hypovolemic hyponatremia is suspected likely exacerbated by poor oral intake recently.  He also had normal phosphorus in outside ER that decreased to 1.7 at admission and now 1.4 this morning.  Hypophosphatemia is likely due to electrolyte shifts after starting treatment of DKA and also  may be exacerbated by recent poor oral nutritional intake.  -Ordered recheck of sodium  -Ordered phosphorus supplementation per standard protocol   -Continue to monitor phosphorus as indicated    Elevated lipase  Found to have significantly elevated lipase at outside ER confirmed upon admission to this hospital with lipase 1364 (60 is the upper limit of normal).  LFTs were normal arguing against biliary obstruction.  No acute radiographic abnormalities were reported in the upper abdomen on chest CT.  Although acute pancreatitis is possible, he has not had abdominal pain.  Peptic ulcer disease is possible but he has not had abdominal pain.  -Ordered recheck of LFTs and lipase  -Follow clinically with low threshold for specific abdominal imaging to evaluate for cause for elevated lipase if it persists  -Ordered check of triglycerides    COPD with recent exacerbation  Tobacco use  He was seen in ER September 27 and diagnosed with COPD exacerbation for which he was prescribed treatment course of Zithromax and DuoNebs.  He was hyperglycemic with blood sugar 419 at that time and was not prescribed corticosteroids accordingly.  He continues to smoke cigarettes chronically.  So far, clinical course has not been worrisome for persistent COPD exacerbation.  -Continue DuoNebs as needed  -Encourage smoking cessation    Elevated troponin  Old MI  Hypertension  He has reported history of old MI in 2002 (details unknown) and chronic hypertension and appears to be taking metoprolol chronically.  Echo stress testing in May 2016 was normal.  He presented to outside ER with mildly elevated troponin which was confirmed upon admission to this hospital and which subsequently trended downward.  He has not had anginal pain or acute ischemic EKG changes.  Troponin was normal at ER visit on September 27, so this is a significant change.  However, suspect elevated troponin was due to stress associated with other acute illness including DKA  and tachyarrhythmia.  Echo today demonstrates normal LV systolic function without regional wall motion abnormality.  -Not continuing to follow troponin levels unless his clinical status changes  -Continue chronic beta-blocker and statin  -Aspirin or other antiplatelet therapy is not reported on his home medication list but could consider adding aspirin therapy for secondary prevention       Diet: Low Consistent Carb (45 g CHO per Meal) Diet    DVT Prophylaxis: Enoxaparin (Lovenox) SQ  Drummond Catheter: Not present  Lines: None     Cardiac Monitoring: ACTIVE order. Indication: Tachyarrhythmias, acute (48 hours)  Code Status: Full Code      Clinically Significant Risk Factors Present on Admission                  # Hypertension: Noted on problem list     # DMII: A1C = 10.8 % (Ref range: <5.7 %) within past 6 months               Disposition Plan      Expected Discharge Date: 10/04/2023                  Joe Lam MD  Hospitalist Service  [facility]  Securely message with [employer] (more info)  Text page via [email or web address] Paging/Directory   ______________________________________________________________________    Interval History   Patient was admitted early this morning.  He has been afebrile and hemodynamically stable with normal heart rate.  Oxygenation has been normal.  He continues to receive DKA treatment with insulin infusion and IV fluids in ICU per protocol.  Oxygenation has been normal.  He is voiding spontaneously.    Physical Exam   Vital Signs: Temp: 97.8  F (36.6  C) Temp src: Oral BP: 120/88 Pulse: 73   Resp: 17 SpO2: 98 % O2 Device: None (Room air)    Patient Vitals for the past 24 hrs:   BP Temp Temp src Pulse Resp SpO2 Height Weight   10/02/23 0900 120/88 -- -- 73 17 98 % -- --   10/02/23 0800 114/79 -- -- 70 15 98 % -- --   10/02/23 0736 -- 97.8  F (36.6  C) Oral -- -- -- -- --   10/02/23 0700 112/78 -- -- 70 15 100 % -- --   10/02/23 0655 117/80 -- -- 68 16 100 % -- --    10/02/23 0650 -- -- -- 67 13 97 % -- --   10/02/23 0645 -- -- -- 76 20 98 % -- --   10/02/23 0640 -- -- -- 68 13 100 % -- --   10/02/23 0635 -- -- -- 67 14 99 % -- --   10/02/23 0630 -- -- -- 67 13 99 % -- --   10/02/23 0625 -- -- -- 66 15 99 % -- --   10/02/23 0620 -- -- -- 67 16 99 % -- --   10/02/23 0615 -- -- -- 69 14 99 % -- --   10/02/23 0610 -- -- -- 70 15 99 % -- --   10/02/23 0605 -- -- -- 70 16 99 % -- --   10/02/23 0600 -- -- -- 69 15 99 % -- --   10/02/23 0555 -- -- -- 69 15 98 % -- --   10/02/23 0550 -- -- -- 70 15 99 % -- --   10/02/23 0545 -- -- -- 69 16 99 % -- --   10/02/23 0540 -- -- -- 68 19 99 % -- --   10/02/23 0535 -- -- -- 69 16 99 % -- --   10/02/23 0530 -- -- -- 69 16 99 % -- --   10/02/23 0525 -- -- -- 68 18 98 % -- --   10/02/23 0520 -- -- -- 69 18 98 % -- --   10/02/23 0515 -- -- -- 68 15 98 % -- --   10/02/23 0510 -- -- -- 68 17 98 % -- --   10/02/23 0505 -- -- -- 67 18 98 % -- --   10/02/23 0500 -- -- -- 66 17 99 % -- --   10/02/23 0455 -- -- -- 77 19 99 % -- --   10/02/23 0450 -- -- -- 70 24 97 % -- --   10/02/23 0445 -- -- -- 84 13 97 % -- --   10/02/23 0440 -- -- -- 66 18 100 % -- --   10/02/23 0435 -- -- -- 65 14 99 % -- --   10/02/23 0430 -- -- -- 67 20 99 % -- --   10/02/23 0425 -- -- -- 69 23 96 % -- --   10/02/23 0420 -- -- -- 91 (!) 33 98 % -- --   10/02/23 0415 -- -- -- 70 17 98 % -- --   10/02/23 0410 -- -- -- 69 15 97 % -- --   10/02/23 0405 -- -- -- 70 20 98 % -- --   10/02/23 0400 113/78 98.7  F (37.1  C) Oral 70 15 98 % -- --   10/02/23 0355 -- -- -- 71 17 99 % -- --   10/02/23 0350 -- -- -- 69 15 99 % -- --   10/02/23 0345 -- -- -- 71 18 99 % -- --   10/02/23 0340 -- -- -- 70 19 (!) 87 % -- --   10/02/23 0335 -- -- -- 72 17 98 % -- --   10/02/23 0330 -- -- -- 72 18 97 % -- --   10/02/23 0325 -- -- -- 72 16 98 % -- --   10/02/23 0320 -- -- -- 72 19 97 % -- --   10/02/23 0315 -- -- -- 72 18 98 % -- --   10/02/23 0310 -- -- -- 70 18 97 % -- --   10/02/23 0305 -- -- -- 70  "16 98 % -- --   10/02/23 0300 109/82 -- -- 67 19 98 % -- --   10/02/23 0255 -- -- -- 70 18 98 % -- --   10/02/23 0250 -- -- -- 72 14 98 % -- --   10/02/23 0245 -- -- -- 71 17 98 % -- --   10/02/23 0240 -- -- -- 77 22 99 % -- --   10/02/23 0235 -- -- -- 73 14 100 % -- --   10/02/23 0230 -- -- -- 71 16 98 % -- --   10/02/23 0225 -- -- -- 91 26 99 % -- --   10/02/23 0220 -- -- -- 75 15 98 % -- --   10/02/23 0215 -- -- -- 70 14 98 % -- --   10/02/23 0210 -- -- -- 76 17 98 % -- --   10/02/23 0205 -- -- -- 75 19 98 % -- --   10/02/23 0200 119/85 -- -- 75 19 99 % -- --   10/02/23 0155 -- -- -- 75 18 99 % -- --   10/02/23 0150 -- -- -- 77 18 98 % -- --   10/02/23 0145 -- -- -- 79 22 98 % -- --   10/02/23 0140 -- -- -- 78 18 97 % -- --   10/02/23 0135 -- -- -- -- 20 98 % -- --   10/02/23 0130 -- 98.6  F (37  C) Oral 78 21 98 % 1.803 m (5' 11\") 81.1 kg (178 lb 14.4 oz)   10/02/23 0125 128/85 -- -- -- -- -- -- --     Weight: 178 lbs 14.4 oz    Medical Decision Making       55 MINUTES SPENT BY ME on the date of service doing chart review, history, exam, documentation & further activities per the note.  NOTE(S)/MEDICAL RECORDS REVIEWED over the past 24 hours: Reviewed notes from emergency room visit in [place] last night including provider note, reviewed results of stress echocardiogram from May 2016, reviewed EKG from September 27 ER visit, reviewed ER provider notes from ER visit September 27       Data     I have personally reviewed the following data over the past 24 hrs:    12.1 (H)  \   15.9   / 199     137 107 19.6 /  161 (H)   3.5 17 (L) 1.01 \     ALT: 11 AST: 19 AP: 110 TBILI: 0.6   ALB: 3.7 TOT PROTEIN: 6.3 (L) LIPASE: 1,364 (H)     Trop: 31 (H) BNP: N/A     TSH: N/A T4: N/A A1C: 10.8 (H)       Blood sugars ranged 135-208 overnight on insulin infusion, hemoglobin A1c was 10.8    Blood cultures were obtained in outside emergency room last night    Cardiac monitor results reviewed since admission: Normal sinus rhythm, no " arrhythmias    Cardiac monitor strip from outside ER last night just before 10 PM reviewed: Regular narrow complex tachycardia at heart rate approximately 200 without visible P waves    EKGs from outside ER last night reviewed: Normal sinus rhythm with PACs    Imaging results reviewed over the past 24 hrs:   Recent Results (from the past 24 hour(s))   XR CHEST 1 VIEW PORTABLE    Narrative    For Patients:  As a result of the 21st Century Cures Act, medical imaging exams and procedure reports are released immediately into your electronic medical record.  You may view this report before your referring provider.  If you have questions, please contact your health care provider.      INDICATION:  Weakness    COMPARISON:  None    TECHNIQUE:  Single-view study    FINDINGS:  TUBES AND LINES: None.    HEART AND MEDIASTINUM: The heart size is normal. The mediastinal contour appears normal for patient age.    LUNGS AND PLEURAL SPACES: The lungs appear normal.The pleural spaces are unremarkable.    OSSEOUS STRUCTURES: Age-appropriate appearance. No acute focal finding.    Impression    No evidence of active pulmonary disease.        Dictated by Florin Eng MD @ 10/1/2023 7:52:50 PM    (Electronically Signed)   CT CHEST PE STUDY    Narrative    For Patients:  As a result of the 21st Century Cures Act, medical imaging exams and procedure reports are released immediately into your electronic medical record.  You may view this report before your referring provider.  If you have questions, please contact your health care provider.      INDICATION: Chest pain, Elevated D-dimer     TECHNIQUE: CT chest with i.v. contrast using pulmonary angiographic technique. Coronal and sagittal reformats were obtained.     CONTRAST: 75 mL Omnipaque 350     COMPARISON: None     FINDINGS:     Cardiovascular: The main pulmonary arteries near the upper limits of normal size measuring 2.7 cm. The heart has an unremarkable appearance and size. Mild  aneurysmal enlargement of the ascending aorta is noted measuring 4 cm.     Mediastinum: No mass or adenopathy seen.     Lung: Both lungs are unremarkable in appearance.     Pleura and pericardium: No sign of pleural effusion seen. No significant pericardial effusion is present.     Chest wall and axilla: No mass or adenopathy seen.     Bone: Unremarkable for age.     Upper abdomen: There is an exophytic cyst in the lower pole of the left kidney measuring 2 cm. Small ill-defined hypodensity is present in the left lobe of the liver, abutting the fissure for the ligamentum teres, which is most likely due to focal fatty infiltration or due to third inflow phenomenon.     IMPRESSIONS:   1. No CT evidence of acute pulmonary emboli seen.   2. Mild aneurysmal enlargement of the ascending aorta is noted measuring 4 cm.    Dictated by Fercho Rodriguez MD @ 10/1/2023 9:28:11 PM    Please note that all CT scans at this facility use dose modulation, iterative reconstruction, and/or weight-based dosing when appropriate to reduce radiation dose to as low as reasonably achievable.    Dictated by: Fercho Rodriguez MD @ 10/01/2023 21:29:21    (Electronically Signed)    VENOUS LOWER EXTREMITY BILATERAL PORTABLE    Narrative    For Patients:  As a result of the 21st Century Cures Act, medical imaging exams and procedure reports are released immediately into your electronic medical record.  You may view this report before your referring provider.  If you have questions, please contact your health care provider.      INDICATION: Leg Swelling     TECHNIQUE: Ultrasound venous duplex bilateral lower extremities. Real-time gray-scale (B mode 2D), color Doppler, and spectral Doppler imaging were performed with compression and augmentation.     COMPARISON: None     FINDINGS:     Deep veins: The bilateral common femoral, femoral, popliteal, and visualized calf veins are fully compressible, demonstrate normal color flow, and normal response to mechanical  augmentation. The Duplex Doppler waveforms are normal in appearance. Transient tachycardia was noted when scanning the left leg veins.     Superficial veins: The visualized greater saphenous and superficial veins of the leg and calf are unremarkable.     Soft tissue: No masses or cysts are identified. No adenopathy is seen.     Impression    1. No sonographic evidence of acute deep venous thrombosis seen in either lower extremities.   2. Transient tachycardia was noted when scanning the left leg veins.       Dictated by: Fercho Rodriguez MD @ 10/01/2023 22:33:33    (Electronically Signed)   Echocardiogram Complete   Result Value    LVEF  50-55%    Narrative    [id number]  [id number]  [id number]  [id number]^EMILIANO^CONY^S     [facility]  Echocardiography Laboratory  [address] [address]     Name: LORI SINGH  MRN: [id number]  : 1960  Study Date: 10/02/2023 08:27 AM  Age: 63 yrs  Gender: Male  Patient Location: [facility]  Reason For Study: Atrial Fibrillation  History: copd, hyperlipdemia,htn ,dm  Ordering Physician: CONY CUMMINGS  Performed By: Linda Currie     BSA: 2.0 m2  Height: 71 in  Weight: 178 lb  HR: 87  BP: 112/78 mmHg  ______________________________________________________________________________  Procedure  Complete Portable Echo Adult. Optison (NDC #6888-8456) given intravenously.  ______________________________________________________________________________  Interpretation Summary     1. Left ventricular systolic function is low normal. The visual ejection  fraction is 50-55%.  2. Septal bounc presence. No regional wall motion abnormalities noted.  3. The right ventricle is normal in structure, function and size.  4. No evidence for significant valvular pathology.  ______________________________________________________________________________  Left Ventricle  The left ventricle is normal in size. There is normal left ventricular wall  thickness. The visual  ejection fraction is 50-55%. Left ventricular diastolic  function is normal. Left ventricular systolic function is low normal. No  regional wall motion abnormalities noted.     Right Ventricle  The right ventricle is normal in structure, function and size.     Atria  Normal left atrial size. Right atrial size is normal. There is no color  Doppler evidence of an atrial shunt.     Mitral Valve  There is mild mitral annular calcification.     Tricuspid Valve  The tricuspid valve is normal in structure and function. There is trace  tricuspid regurgitation.     Aortic Valve  The aortic valve is trileaflet with aortic valve sclerosis.     Pulmonic Valve  The pulmonic valve is not well seen, but is grossly normal. There is trace  pulmonic valvular regurgitation.     Vessels  Normal size aorta. IVC diameter <2.1 cm collapsing >50% with sniff suggests a  normal RA pressure of 3 mmHg.     Pericardium  There is no pericardial effusion.     Rhythm  Sinus rhythm was noted.  ______________________________________________________________________________  MMode/2D Measurements & Calculations     IVSd: 0.93 cm  LVIDd: 4.1 cm  LVIDs: 3.1 cm  LVPWd: 0.97 cm  FS: 25.0 %  LV mass(C)d: 125.9 grams  LV mass(C)dI: 62.7 grams/m2  Ao root diam: 3.8 cm  LA dimension: 3.0 cm  asc Aorta Diam: 3.7 cm  LA/Ao: 0.79  Ao root diam index Ht(cm/m): 2.1  Ao root diam index BSA (cm/m2): 1.9  Asc Ao diam index BSA (cm/m2): 1.8  Asc Ao diam index Ht(cm/m): 2.1  LA Volume (BP): 65.8 ml     LA Volume Index (BP): 32.7 ml/m2  RWT: 0.47  TAPSE: 2.4 cm     Doppler Measurements & Calculations  MV E max jass: 69.8 cm/sec  MV A max jass: 55.7 cm/sec  MV E/A: 1.3  MV dec time: 0.30 sec  Ao V2 max: 150.9 cm/sec  Ao max P.0 mmHg  LV V1 max P.1 mmHg  LV V1 max: 72.8 cm/sec  PA V2 max: 82.5 cm/sec  PA max P.7 mmHg  PA acc time: 0.13 sec  PI end-d jass: 126.0 cm/sec  TR max jass: 251.0 cm/sec  TR max P.2 mmHg  AV Jass Ratio (DI): 0.48  Medial E/e': 10.0      RV S Jass: 15.7 cm/sec     ______________________________________________________________________________  Report approved by: Randal Blanchard 10/02/2023 10:52 AM           Recent Labs   Lab 10/02/23  1308 10/02/23  1213 10/02/23  1124 10/02/23  1034 10/02/23  0958 10/02/23  0634 10/02/23  0530 10/02/23  0341 10/02/23  0305 10/02/23  0135 09/27/23  2252   WBC  --   --   --   --   --   --  12.1*  --   --   --  7.9   HGB  --   --   --   --   --   --  15.9  --   --   --  16.2   MCV  --   --   --   --   --   --  87  --   --   --  87   PLT  --   --   --   --   --   --  199  --   --   --  185   NA  --   --   --   --  137  --  132*  --  134*  --  133*   POTASSIUM  --   --   --   --  3.5  --  3.8  --  3.8  --  3.9   CHLORIDE  --   --   --   --  107  --  103  --  105  --  97*   CO2  --   --   --   --  17*  --  15*  --  15*  --  22   BUN  --   --   --   --  19.6  --  21.0  --  21.1  --  16.8   CR  --   --   --   --  1.01  --  1.05  --  1.02  --  0.95   ANIONGAP  --   --   --   --  13  --  14  --  14  --  14   REGINALD  --   --   --   --  9.7  --  9.5  --  9.7  --  9.6   * 143* 162*   < > 157*   < > 174*   < > 137*   < > 419*   ALBUMIN  --   --   --   --   --   --   --   --  3.7  --  4.4   PROTTOTAL  --   --   --   --   --   --   --   --  6.3*  --  7.0   BILITOTAL  --   --   --   --   --   --   --   --  0.6  --  0.7   ALKPHOS  --   --   --   --   --   --   --   --  110  --  114   ALT  --   --   --   --   --   --   --   --  11  --  13   AST  --   --   --   --   --   --   --   --  19  --  16   LIPASE  --   --   --   --   --   --   --   --  1,364*  --   --     < > = values in this interval not displayed.

## 2023-10-02 NOTE — H&P
HOSPITALIST TELEMED ADMISSION H&P    Service Date : 10/2/2023  Dr. Damian ROBERT am located in [place].   Galdino Nelson is located in [place] at [facility]. The RN or tech on duty in the ED is assisting me today with this patient.    chief complaint :SOB    History of Present Illness:   Pt is a 62 yo m with h/o DM on insulin pump, referred for admission for DKA and new onset AFib RVR.  Patient was recently seen here in the ER and placed on antibiotics for COPD exacerbation but it does not look like he was put on any steroids.  He went to outside hospital today complaining of cough, shortness of breath, weakness and poor p.o. intake and fatigue.  He was found be in DKA and AFib with RVR.  He was placed on insulin drip and transferred here.  He had a chest CT showed no PE, mild ascending aortic enlargement of 4 cm.  He had a lipase that was checked for unknown reasons as he reportedly has no abdominal pain which was elevated at 6000.  Troponins were borderline at 0.056 and 0.057.  White count elevated WBC of 16.  BMP showed a CO2 of 6 anion gap of 25.    Vitals here are afebrile with stable vital signs.  Blood sugar is 136.    Past Medical History  Past Medical History:   Diagnosis Date    Chronic obstructive pulmonary disease (H) 11/18/2011      Patient Active Problem List   Diagnosis    CARDIOVASCULAR SCREENING; LDL GOAL LESS THAN 160    Chronic obstructive pulmonary disease (H)    CAD in native artery    Diabetes mellitus, type II (H)    Essential hypertension    Hyperlipidemia    Insulin pump status    Migraine    Tobacco use disorder         Past Surgical History  No past surgical history on file.   Social History  Galdino  reports that he has been smoking. He has been smoking an average of 1 pack per day. He has never used smokeless tobacco. He reports that he does not currently use alcohol.    Family History  Galdino's family history is not on file.    Home Medications  No current facility-administered medications for  "this encounter.       Allergies  No Known Allergies     10 pt ROS neg except as noted in HPI    Physical Exam:  I performed all aspects of the physical examination via Telemedicine associated with two way audio and video communication.    Vital Signs: Temperature 98.6  F (37  C), temperature source Oral, height 1.803 m (5' 11\"), weight 81.1 kg (178 lb 14.4 oz).    Physical Exam    Gen:  Well-developed, well-nourished, in no acute distress, lying semi-supine in hospital stretcher  HEENT:  Anicteric sclera, PER, hearing intact to voice  Resp:  No accessory muscle use, breath sounds clear; no wheezes no rales no rhonchi  Card:  No murmur, normal S1, S2   Abd:  Soft per RN exam, no TTP, non-distended, normoactive bowel sounds are present  Musc:  Normal strength and movement of the major muscle groups without obvious deformity  Psych:  Good insight, oriented to person, place and time, not anxious, not agitated    DATA  Labs:  I have personally reviewed the following studies:  Recent Labs   Lab 09/27/23  2252   POTASSIUM 3.9   CHLORIDE 97*   CO2 22   BUN 16.8   ALBUMIN 4.4   ALKPHOS 114   AST 16   ALT 13      Recent Labs   Lab 09/27/23  2252   WBC 7.9   HGB 16.2   HCT 46.3          Imaging: ER imaging reviewed    ASSESSMENT:  Active Problems:    * No active hospital problems. *    PLAN:         DKA:  Admit to ICU and treated with DKA protocol    AFib RVR:  Given metoprolol as an outpatient will continue that.  Will order p.r.n. IV metoprolol.  If that does not work will put him on a Dilt drip.  Troponins are slightly elevated at outside facility.  Will trend. Check echo.     Elevated lipase: I am not sure what to make of this.  The ER doctor was unable to tell me why it was even ordered.  Patient denies any abdominal pain or GI symptoms.  Will rpt. If elevated, Consider MRCP for underlying malignancy. does not appear to have any pancreatitis.    COPD:  Will order DuoNebs scheduled and p.r.n.Recommend quit smoking. "     HTN: cont home metop    Clinically Significant Risk Factors Present on Admission                  # Hypertension: Noted on problem list                   Misc  DVT prophylaxis: lovenox  Code Status: Full code     The plan was discussed with - Patient  30 min

## 2023-10-02 NOTE — PROGRESS NOTES
S-(situation): Patient arrives to room 216 via cart from [facility] ED [place]    B-(background): Called EMS due to weakness and SOB. Found to be in DKA upon workup in ED at [facility], blood sugar 336, elevated anion gap of 23, beta hydroxy >6. Also had an episode of new a. Fib with RVR which spontaneously resolved. Elevated d-dimer, CT PE study negative, bilateral lower extremity negative, chest xray negative. Elevated lipase of 6348.     A-(assessment): Blood sugar 175. Drowsy, oriented x4. No skin issues. Insulin drip from [facility] infusing at 6.46 units/hr. Initiated Wellington Algorithm 1, insulin resumed at 1unit/hr. No oxygen needs. No skin issues.     R-(recommendations): Orders reviewed with patient. Will monitor patient per MD orders.     Inpatient nursing criteria listed below were met:    Health care directives status obtained and documented: Yes  VTE ordered/documented: Yes  Skin issues/needs documented:NA  Isolation addressed and Signage used: NA  Fall Prevention: Care plan updated Yes Education given and documented Yes  Care Plan initiated and Co-Morbidities added: Yes  Education Assessment documented:Yes  Admission Education Documented: Yes  If present CAUTI/CLABI Education done: NA  New medication patient education completed and documented (Possible Side Effects of Common Medications handout): Yes  Allergies Reviewed: Yes  Admission Medication Reconciliation completed: Yes  Home medications if not able to send immediately home with family stored here: NA  Reminder note placed in discharge instructions regarding home meds: NA  Individualized care needs/preferences addressed and charted: Yes  Provider Notified that patient has arrived to the unit: Yes

## 2023-10-03 LAB
ALBUMIN SERPL BCG-MCNC: 3.1 G/DL (ref 3.5–5.2)
ALP SERPL-CCNC: 91 U/L (ref 40–129)
ALT SERPL W P-5'-P-CCNC: 10 U/L (ref 0–70)
ANION GAP SERPL CALCULATED.3IONS-SCNC: 11 MMOL/L (ref 7–15)
ANION GAP SERPL CALCULATED.3IONS-SCNC: 12 MMOL/L (ref 7–15)
ANION GAP SERPL CALCULATED.3IONS-SCNC: 15 MMOL/L (ref 7–15)
ANION GAP SERPL CALCULATED.3IONS-SCNC: 16 MMOL/L (ref 7–15)
AST SERPL W P-5'-P-CCNC: 16 U/L (ref 0–45)
B-OH-BUTYR SERPL-SCNC: 1 MMOL/L
BASE EXCESS BLDV CALC-SCNC: -4.1 MMOL/L (ref -7.7–1.9)
BILIRUB SERPL-MCNC: 0.6 MG/DL
BUN SERPL-MCNC: 11.3 MG/DL (ref 8–23)
BUN SERPL-MCNC: 11.5 MG/DL (ref 8–23)
BUN SERPL-MCNC: 12.2 MG/DL (ref 8–23)
BUN SERPL-MCNC: 13 MG/DL (ref 8–23)
CALCIUM SERPL-MCNC: 9 MG/DL (ref 8.8–10.2)
CALCIUM SERPL-MCNC: 9.1 MG/DL (ref 8.8–10.2)
CHLORIDE SERPL-SCNC: 105 MMOL/L (ref 98–107)
CHLORIDE SERPL-SCNC: 107 MMOL/L (ref 98–107)
CHLORIDE SERPL-SCNC: 109 MMOL/L (ref 98–107)
CHLORIDE SERPL-SCNC: 110 MMOL/L (ref 98–107)
CREAT SERPL-MCNC: 0.87 MG/DL (ref 0.67–1.17)
CREAT SERPL-MCNC: 0.91 MG/DL (ref 0.67–1.17)
CREAT SERPL-MCNC: 0.95 MG/DL (ref 0.67–1.17)
CREAT SERPL-MCNC: 0.98 MG/DL (ref 0.67–1.17)
DEPRECATED HCO3 PLAS-SCNC: 15 MMOL/L (ref 22–29)
DEPRECATED HCO3 PLAS-SCNC: 16 MMOL/L (ref 22–29)
DEPRECATED HCO3 PLAS-SCNC: 19 MMOL/L (ref 22–29)
DEPRECATED HCO3 PLAS-SCNC: 19 MMOL/L (ref 22–29)
EGFRCR SERPLBLD CKD-EPI 2021: 87 ML/MIN/1.73M2
EGFRCR SERPLBLD CKD-EPI 2021: 90 ML/MIN/1.73M2
EGFRCR SERPLBLD CKD-EPI 2021: >90 ML/MIN/1.73M2
EGFRCR SERPLBLD CKD-EPI 2021: >90 ML/MIN/1.73M2
GLUCOSE BLDC GLUCOMTR-MCNC: 100 MG/DL (ref 70–99)
GLUCOSE BLDC GLUCOMTR-MCNC: 115 MG/DL (ref 70–99)
GLUCOSE BLDC GLUCOMTR-MCNC: 120 MG/DL (ref 70–99)
GLUCOSE BLDC GLUCOMTR-MCNC: 131 MG/DL (ref 70–99)
GLUCOSE BLDC GLUCOMTR-MCNC: 140 MG/DL (ref 70–99)
GLUCOSE BLDC GLUCOMTR-MCNC: 140 MG/DL (ref 70–99)
GLUCOSE BLDC GLUCOMTR-MCNC: 141 MG/DL (ref 70–99)
GLUCOSE BLDC GLUCOMTR-MCNC: 142 MG/DL (ref 70–99)
GLUCOSE BLDC GLUCOMTR-MCNC: 148 MG/DL (ref 70–99)
GLUCOSE BLDC GLUCOMTR-MCNC: 151 MG/DL (ref 70–99)
GLUCOSE BLDC GLUCOMTR-MCNC: 152 MG/DL (ref 70–99)
GLUCOSE BLDC GLUCOMTR-MCNC: 161 MG/DL (ref 70–99)
GLUCOSE BLDC GLUCOMTR-MCNC: 182 MG/DL (ref 70–99)
GLUCOSE BLDC GLUCOMTR-MCNC: 184 MG/DL (ref 70–99)
GLUCOSE BLDC GLUCOMTR-MCNC: 186 MG/DL (ref 70–99)
GLUCOSE BLDC GLUCOMTR-MCNC: 194 MG/DL (ref 70–99)
GLUCOSE BLDC GLUCOMTR-MCNC: 202 MG/DL (ref 70–99)
GLUCOSE BLDC GLUCOMTR-MCNC: 219 MG/DL (ref 70–99)
GLUCOSE BLDC GLUCOMTR-MCNC: 288 MG/DL (ref 70–99)
GLUCOSE BLDC GLUCOMTR-MCNC: 293 MG/DL (ref 70–99)
GLUCOSE SERPL-MCNC: 121 MG/DL (ref 70–99)
GLUCOSE SERPL-MCNC: 152 MG/DL (ref 70–99)
GLUCOSE SERPL-MCNC: 166 MG/DL (ref 70–99)
GLUCOSE SERPL-MCNC: 249 MG/DL (ref 70–99)
HCO3 BLDV-SCNC: 22 MMOL/L (ref 21–28)
LIPASE SERPL-CCNC: 58 U/L (ref 13–60)
O2/TOTAL GAS SETTING VFR VENT: 21 %
PCO2 BLDV: 45 MM HG (ref 40–50)
PH BLDV: 7.31 [PH] (ref 7.32–7.43)
PHOSPHATE SERPL-MCNC: 2.4 MG/DL (ref 2.5–4.5)
PO2 BLDV: 34 MM HG (ref 25–47)
POTASSIUM SERPL-SCNC: 3.4 MMOL/L (ref 3.4–5.3)
POTASSIUM SERPL-SCNC: 3.4 MMOL/L (ref 3.4–5.3)
POTASSIUM SERPL-SCNC: 4 MMOL/L (ref 3.4–5.3)
POTASSIUM SERPL-SCNC: 4.2 MMOL/L (ref 3.4–5.3)
POTASSIUM SERPL-SCNC: 4.7 MMOL/L (ref 3.4–5.3)
PROT SERPL-MCNC: 5.3 G/DL (ref 6.4–8.3)
SODIUM SERPL-SCNC: 137 MMOL/L (ref 135–145)
SODIUM SERPL-SCNC: 138 MMOL/L (ref 135–145)
SODIUM SERPL-SCNC: 139 MMOL/L (ref 135–145)
SODIUM SERPL-SCNC: 140 MMOL/L (ref 135–145)
WBC # BLD AUTO: 8.1 10E3/UL (ref 4–11)

## 2023-10-03 PROCEDURE — 84100 ASSAY OF PHOSPHORUS: CPT | Performed by: PEDIATRICS

## 2023-10-03 PROCEDURE — 99233 SBSQ HOSP IP/OBS HIGH 50: CPT | Performed by: INTERNAL MEDICINE

## 2023-10-03 PROCEDURE — 250N000009 HC RX 250: Performed by: PEDIATRICS

## 2023-10-03 PROCEDURE — 250N000011 HC RX IP 250 OP 636: Mod: JZ | Performed by: INTERNAL MEDICINE

## 2023-10-03 PROCEDURE — 94640 AIRWAY INHALATION TREATMENT: CPT | Mod: 76

## 2023-10-03 PROCEDURE — 36415 COLL VENOUS BLD VENIPUNCTURE: CPT | Performed by: PEDIATRICS

## 2023-10-03 PROCEDURE — 85048 AUTOMATED LEUKOCYTE COUNT: CPT | Performed by: PEDIATRICS

## 2023-10-03 PROCEDURE — 83690 ASSAY OF LIPASE: CPT | Performed by: PEDIATRICS

## 2023-10-03 PROCEDURE — 80053 COMPREHEN METABOLIC PANEL: CPT | Performed by: PEDIATRICS

## 2023-10-03 PROCEDURE — 82803 BLOOD GASES ANY COMBINATION: CPT | Performed by: PEDIATRICS

## 2023-10-03 PROCEDURE — 82010 KETONE BODYS QUAN: CPT | Performed by: PEDIATRICS

## 2023-10-03 PROCEDURE — 94640 AIRWAY INHALATION TREATMENT: CPT

## 2023-10-03 PROCEDURE — 258N000003 HC RX IP 258 OP 636: Performed by: INTERNAL MEDICINE

## 2023-10-03 PROCEDURE — 200N000001 HC R&B ICU

## 2023-10-03 PROCEDURE — 84132 ASSAY OF SERUM POTASSIUM: CPT | Performed by: PEDIATRICS

## 2023-10-03 PROCEDURE — 250N000012 HC RX MED GY IP 250 OP 636 PS 637: Performed by: INTERNAL MEDICINE

## 2023-10-03 PROCEDURE — 258N000003 HC RX IP 258 OP 636: Performed by: PEDIATRICS

## 2023-10-03 PROCEDURE — 36415 COLL VENOUS BLD VENIPUNCTURE: CPT | Performed by: INTERNAL MEDICINE

## 2023-10-03 PROCEDURE — 250N000013 HC RX MED GY IP 250 OP 250 PS 637: Performed by: PEDIATRICS

## 2023-10-03 PROCEDURE — 80048 BASIC METABOLIC PNL TOTAL CA: CPT | Performed by: INTERNAL MEDICINE

## 2023-10-03 PROCEDURE — 250N000009 HC RX 250: Performed by: INTERNAL MEDICINE

## 2023-10-03 PROCEDURE — 250N000013 HC RX MED GY IP 250 OP 250 PS 637: Performed by: INTERNAL MEDICINE

## 2023-10-03 RX ORDER — POTASSIUM CHLORIDE 1500 MG/1
40 TABLET, EXTENDED RELEASE ORAL ONCE
Status: COMPLETED | OUTPATIENT
Start: 2023-10-03 | End: 2023-10-03

## 2023-10-03 RX ORDER — WATER 10 ML/10ML
INJECTION INTRAMUSCULAR; INTRAVENOUS; SUBCUTANEOUS
Status: DISCONTINUED
Start: 2023-10-03 | End: 2023-10-04 | Stop reason: HOSPADM

## 2023-10-03 RX ORDER — SODIUM CHLORIDE 9 MG/ML
INJECTION, SOLUTION INTRAVENOUS CONTINUOUS
Status: DISCONTINUED | OUTPATIENT
Start: 2023-10-03 | End: 2023-10-04

## 2023-10-03 RX ADMIN — SODIUM PHOSPHATE, MONOBASIC, MONOHYDRATE AND SODIUM PHOSPHATE, DIBASIC, ANHYDROUS 9 MMOL: 142; 276 INJECTION, SOLUTION INTRAVENOUS at 01:53

## 2023-10-03 RX ADMIN — IPRATROPIUM BROMIDE AND ALBUTEROL SULFATE 3 ML: 2.5; .5 SOLUTION RESPIRATORY (INHALATION) at 20:01

## 2023-10-03 RX ADMIN — IPRATROPIUM BROMIDE AND ALBUTEROL SULFATE 3 ML: 2.5; .5 SOLUTION RESPIRATORY (INHALATION) at 11:42

## 2023-10-03 RX ADMIN — POTASSIUM CHLORIDE 40 MEQ: 1500 TABLET, EXTENDED RELEASE ORAL at 06:45

## 2023-10-03 RX ADMIN — INSULIN DETEMIR 5 UNITS: 100 INJECTION, SOLUTION SUBCUTANEOUS at 11:01

## 2023-10-03 RX ADMIN — SODIUM CHLORIDE: 9 INJECTION, SOLUTION INTRAVENOUS at 13:40

## 2023-10-03 RX ADMIN — PAROXETINE 30 MG: 20 TABLET, FILM COATED ORAL at 08:27

## 2023-10-03 RX ADMIN — POTASSIUM & SODIUM PHOSPHATES POWDER PACK 280-160-250 MG 1 PACKET: 280-160-250 PACK at 10:17

## 2023-10-03 RX ADMIN — SODIUM CHLORIDE: 9 INJECTION, SOLUTION INTRAVENOUS at 22:00

## 2023-10-03 RX ADMIN — POTASSIUM & SODIUM PHOSPHATES POWDER PACK 280-160-250 MG 1 PACKET: 280-160-250 PACK at 06:45

## 2023-10-03 RX ADMIN — IPRATROPIUM BROMIDE AND ALBUTEROL SULFATE 3 ML: 2.5; .5 SOLUTION RESPIRATORY (INHALATION) at 16:35

## 2023-10-03 RX ADMIN — IPRATROPIUM BROMIDE AND ALBUTEROL SULFATE 3 ML: 2.5; .5 SOLUTION RESPIRATORY (INHALATION) at 08:10

## 2023-10-03 RX ADMIN — ENOXAPARIN SODIUM 40 MG: 40 INJECTION SUBCUTANEOUS at 08:27

## 2023-10-03 RX ADMIN — POTASSIUM CHLORIDE, DEXTROSE MONOHYDRATE AND SODIUM CHLORIDE: 150; 5; 450 INJECTION, SOLUTION INTRAVENOUS at 05:09

## 2023-10-03 RX ADMIN — INSULIN ASPART 1 UNITS: 100 INJECTION, SOLUTION INTRAVENOUS; SUBCUTANEOUS at 17:33

## 2023-10-03 RX ADMIN — METOPROLOL SUCCINATE 50 MG: 50 TABLET, EXTENDED RELEASE ORAL at 08:27

## 2023-10-03 RX ADMIN — POTASSIUM & SODIUM PHOSPHATES POWDER PACK 280-160-250 MG 1 PACKET: 280-160-250 PACK at 14:11

## 2023-10-03 ASSESSMENT — ACTIVITIES OF DAILY LIVING (ADL)
ADLS_ACUITY_SCORE: 20

## 2023-10-03 NOTE — PLAN OF CARE
Goal Outcome Evaluation:                    Patient given Levimir insulin around 11 am, then per Dr. Farrell's verbal direction, 2 hours later the D5 IV fluids were discontinued and the Normal Saline started at 75 ml/ hour. This afternoon the provider wrote for diet orders and additional meal time sliding scale insulin coverage along with more Levimir.  Alert and oriented, voiding in adequate amounts. Lab draws are frequent, see lab flowsheets for details. Vital signs are stable.    Problem: Diabetes Comorbidity  Goal: Blood Glucose Level Within Targeted Range  Outcome: Progressing     Problem: Oral Intake Inadequate  Goal: Improved Oral Intake  Outcome: Progressing

## 2023-10-03 NOTE — PROGRESS NOTES
[facility]    Medicine Progress Note - Hospitalist Service    Date of Admission:  10/1/2023    Assessment & Plan   63-year-old man with reported history of type 2 diabetes treated chronically with insulin pump, COPD with ongoing tobacco use and recent exacerbation September 27 treated with Zithromax and bronchodilators, hypertension, and apparent history of old MI in 2002 presented to outside ER (in [place]) last night due to cough, weakness, and decreased oral intake.  Upon evaluation in the ER, he was found to be in diabetic ketoacidosis and also had atrial fibrillation with RVR, so hospitalization was advised and he was transferred to this hospital for inpatient admission.    Diabetic ketoacidosis with SIRS  Reported diagnosis type 2 diabetes treated chronically with insulin pump  -Diabetes DKA resolved.  Gap closed.    -Currently on insulin infusion per DKA protocol: We will bridge with basal insulin and DC insulin infusion after 1 to 2 hours.  -Continue IV fluids per DKA protocol: Noted on D5 half NS at 125, will switch IV fluids to NS at same rate when insulin drip discontinued.  -Continue every 4 hourly BMP monitor   -To commence Accu-Cheks AC/at bedtime  -To commence carb controlled diet  -Discussed with patient the need to follow-up with primary endocrinologist to determine whether he will be able to resume use of insulin pump, as insulin pump appears to be malfunctioning.  Discussed with patient that he will be discharged on subcutaneous insulin.  Patient states that he has appointment scheduled with his endocrinologist for end of October.  Discussed with primary bedside RN/charge RN to explore options for earlier appointment for patient upon discharge.    -We will discontinue antibiotic therapy until clear indication: Procalcitonin noted at 0.13.    -Monitor patient in ICU for another 24 hours.    Paroxysmal narrow complex tachycardia, suspect PSVT  -HR normal to bradycardia.   Monitor  -Continue chronic dose of metoprolol  -Continuing cardiac monitoring while in ICU  -Optimize electrolytes including magnesium and potassium    Hyponatremia  Hypophosphatemia  -Sodium WNL.  Phosphorus noted at 2.4.  -Monitor    Elevated lipase  -Lipase lites: Noted at 58 today    COPD with recent exacerbation  Tobacco use  -Stable  -Continue DuoNebs as needed  -Encourage smoking cessation    Elevated troponin  Old MI  Hypertension  -Not continuing to follow troponin levels unless his clinical status changes  -Continue chronic beta-blocker and statin  -Aspirin or other antiplatelet therapy is not reported on his home medication list but could consider adding aspirin therapy for secondary prevention    DVT: Subcutaneous Lovenox  Disposition: Watch patient's spouse to subcutaneous insulin therapy.  Anticipate discharge in 1 to 2 days.      Darrius Farrell DO  Hospitalist Service  [facility]  Securely message with [employer] (more info)  Text page via [employer] Paging/Directory   ______________________________________________________________________    Interval History   Patient denies any new complaints.  No acute events overnight.  Requesting food.    Physical Exam   Vital Signs: Temp: 97.5  F (36.4  C) Temp src: Oral BP: 110/79 Pulse: 57   Resp: 15 SpO2: 98 % O2 Device: None (Room air)    Patient Vitals for the past 24 hrs:   BP Temp Temp src Pulse Resp SpO2   10/03/23 1206 110/79 97.5  F (36.4  C) Oral 57 15 98 %   10/03/23 0758 123/89 97.5  F (36.4  C) Oral 57 10 100 %   10/03/23 0600 104/67 -- -- 56 16 96 %   10/03/23 0400 125/89 97.9  F (36.6  C) Oral 52 14 98 %   10/03/23 0200 (!) 126/90 -- -- 55 21 100 %   10/03/23 0000 118/77 98  F (36.7  C) Oral 52 16 99 %   10/02/23 2200 117/82 -- -- 56 14 98 %   10/02/23 2100 111/76 -- -- 59 14 99 %   10/02/23 2000 117/82 98.1  F (36.7  C) Oral 57 17 98 %   10/02/23 1900 124/86 -- -- 67 15 98 %   10/02/23 1800 118/81 -- -- 65 16 99 %    10/02/23 1700 119/84 -- -- 67 10 97 %   10/02/23 1600 116/84 97.5  F (36.4  C) -- 64 18 100 %       Weight: 178 lbs 14.4 oz        Data     I have personally reviewed the following data over the past 24 hrs:    8.1  \   N/A   / N/A     139 109 (H) 11.5 /  186 (H)   4.0 15 (L) 0.87 \     ALT: 10 AST: 16 AP: 91 TBILI: 0.6   ALB: 3.1 (L) TOT PROTEIN: 5.3 (L) LIPASE: 58       Blood sugars ranged 135-208 overnight on insulin infusion, hemoglobin A1c was 10.8    Blood cultures were obtained in outside emergency room last night    Cardiac monitor results reviewed since admission: Normal sinus rhythm, no arrhythmias    Cardiac monitor strip from outside ER last night just before 10 PM reviewed: Regular narrow complex tachycardia at heart rate approximately 200 without visible P waves    EKGs from outside ER last night reviewed: Normal sinus rhythm with PACs    Imaging results reviewed over the past 24 hrs:   No results found for this or any previous visit (from the past 24 hour(s)).    Recent Labs   Lab 10/03/23  1504 10/03/23  1409 10/03/23  1402 10/03/23  1102 10/03/23  1046 10/03/23  0605 10/03/23  0522 10/02/23  0634 10/02/23  0530 10/02/23  0341 10/02/23  0305 10/02/23  0135 09/27/23  2252   WBC  --   --   --   --   --   --  8.1  --  12.1*  --   --   --  7.9   HGB  --   --   --   --   --   --   --   --  15.9  --   --   --  16.2   MCV  --   --   --   --   --   --   --   --  87  --   --   --  87   PLT  --   --   --   --   --   --   --   --  199  --   --   --  185   NA  --   --  139  --  140  --  138   < > 132*  --  134*  --  133*   POTASSIUM  --   --  4.0  --  4.7  --  3.4  3.4   < > 3.8  --  3.8  --  3.9   CHLORIDE  --   --  109*  --  110*  --  107   < > 103  --  105  --  97*   CO2  --   --  15*  --  19*  --  19*   < > 15*  --  15*  --  22   BUN  --   --  11.5  --  12.2  --  13.0   < > 21.0  --  21.1  --  16.8   CR  --   --  0.87  --  0.91  --  0.98   < > 1.05  --  1.02  --  0.95   ANIONGAP  --   --  15  --  11  --   12   < > 14  --  14  --  14   REGINALD  --   --  9.0  --  9.1  --  9.0   < > 9.5  --  9.7  --  9.6   * 151* 166*   < > 121*   < > 152*   < > 174*   < > 137*   < > 419*   ALBUMIN  --   --   --   --   --   --  3.1*  --   --   --  3.7  --  4.4   PROTTOTAL  --   --   --   --   --   --  5.3*  --   --   --  6.3*  --  7.0   BILITOTAL  --   --   --   --   --   --  0.6  --   --   --  0.6  --  0.7   ALKPHOS  --   --   --   --   --   --  91  --   --   --  110  --  114   ALT  --   --   --   --   --   --  10  --   --   --  11  --  13   AST  --   --   --   --   --   --  16  --   --   --  19  --  16   LIPASE  --   --   --   --   --   --  58  --   --   --  1,364*  --   --     < > = values in this interval not displayed.

## 2023-10-03 NOTE — PLAN OF CARE
Goal Outcome Evaluation:      Plan of Care Reviewed With: patient    Overall Patient Progress: no changeOverall Patient Progress: no change    Outcome Evaluation: 4 Hour Shift Note: Patient has been alert and oriented. Ambulated to the bathroom stand by assist. Steady gait. No complaints of pain or nausea. Pt reports just feeling blah. Insulin drip infusing. 's-160's. Potassium replaced. Phosphorus to be replaced again tonight. Tele has been SR with rates in the 50's-60's.

## 2023-10-03 NOTE — PLAN OF CARE
Goal Outcome Evaluation:    Plan of Care Reviewed With: patient    Overall Patient Progress: improving    Outcome Evaluation: Pt's VSS. Afebrile. Tele shows SB. Blood sugars 120-200s, insulin gtt infusing. IVF infusing. K+ and Phos replaced. Denies pain. Up with SBA to the bathroom. Steady gait.

## 2023-10-03 NOTE — PROGRESS NOTES
Put a call into patients endocrinologist (at the direction of Dr. Farrell, hospitalist today), patient has an appointment towards the end of October already, but provider wanted to see if we could get his appointment moved up due to his malfunctioning pump.     Yamilka Alfaro, endocrinologist at [facility] in [place] [phone].    They currently do not have any earlier appointments, but took our number here at [facility] and will reach out to Dr. Alfaro's care team to see if they can fit him in earlier anywhere. They will let us know if they have something sooner and also will reach out to the patient himself, via his phone number if they are able to secure an earlier appointment.

## 2023-10-04 LAB
ANION GAP SERPL CALCULATED.3IONS-SCNC: 12 MMOL/L (ref 7–15)
ANION GAP SERPL CALCULATED.3IONS-SCNC: 13 MMOL/L (ref 7–15)
ANION GAP SERPL CALCULATED.3IONS-SCNC: 17 MMOL/L (ref 7–15)
BUN SERPL-MCNC: 14.8 MG/DL (ref 8–23)
BUN SERPL-MCNC: 16.9 MG/DL (ref 8–23)
BUN SERPL-MCNC: 18.6 MG/DL (ref 8–23)
CALCIUM SERPL-MCNC: 9 MG/DL (ref 8.8–10.2)
CALCIUM SERPL-MCNC: 9.1 MG/DL (ref 8.8–10.2)
CALCIUM SERPL-MCNC: 9.3 MG/DL (ref 8.8–10.2)
CHLORIDE SERPL-SCNC: 100 MMOL/L (ref 98–107)
CHLORIDE SERPL-SCNC: 100 MMOL/L (ref 98–107)
CHLORIDE SERPL-SCNC: 102 MMOL/L (ref 98–107)
CREAT SERPL-MCNC: 0.95 MG/DL (ref 0.67–1.17)
CREAT SERPL-MCNC: 0.96 MG/DL (ref 0.67–1.17)
CREAT SERPL-MCNC: 1.07 MG/DL (ref 0.67–1.17)
DEPRECATED HCO3 PLAS-SCNC: 18 MMOL/L (ref 22–29)
DEPRECATED HCO3 PLAS-SCNC: 23 MMOL/L (ref 22–29)
DEPRECATED HCO3 PLAS-SCNC: 23 MMOL/L (ref 22–29)
EGFRCR SERPLBLD CKD-EPI 2021: 78 ML/MIN/1.73M2
EGFRCR SERPLBLD CKD-EPI 2021: 89 ML/MIN/1.73M2
EGFRCR SERPLBLD CKD-EPI 2021: 90 ML/MIN/1.73M2
GLUCOSE BLDC GLUCOMTR-MCNC: 251 MG/DL (ref 70–99)
GLUCOSE BLDC GLUCOMTR-MCNC: 275 MG/DL (ref 70–99)
GLUCOSE BLDC GLUCOMTR-MCNC: 281 MG/DL (ref 70–99)
GLUCOSE BLDC GLUCOMTR-MCNC: 311 MG/DL (ref 70–99)
GLUCOSE BLDC GLUCOMTR-MCNC: 315 MG/DL (ref 70–99)
GLUCOSE SERPL-MCNC: 303 MG/DL (ref 70–99)
GLUCOSE SERPL-MCNC: 333 MG/DL (ref 70–99)
GLUCOSE SERPL-MCNC: 383 MG/DL (ref 70–99)
HOLD SPECIMEN: NORMAL
MAGNESIUM SERPL-MCNC: 1.4 MG/DL (ref 1.7–2.3)
PHOSPHATE SERPL-MCNC: 3.1 MG/DL (ref 2.5–4.5)
POTASSIUM SERPL-SCNC: 3.7 MMOL/L (ref 3.4–5.3)
POTASSIUM SERPL-SCNC: 3.8 MMOL/L (ref 3.4–5.3)
POTASSIUM SERPL-SCNC: 4.3 MMOL/L (ref 3.4–5.3)
SODIUM SERPL-SCNC: 135 MMOL/L (ref 135–145)
SODIUM SERPL-SCNC: 136 MMOL/L (ref 135–145)
SODIUM SERPL-SCNC: 137 MMOL/L (ref 135–145)

## 2023-10-04 PROCEDURE — 250N000009 HC RX 250: Performed by: INTERNAL MEDICINE

## 2023-10-04 PROCEDURE — 94640 AIRWAY INHALATION TREATMENT: CPT | Mod: 76

## 2023-10-04 PROCEDURE — 36415 COLL VENOUS BLD VENIPUNCTURE: CPT | Performed by: PEDIATRICS

## 2023-10-04 PROCEDURE — 250N000011 HC RX IP 250 OP 636: Mod: JZ | Performed by: INTERNAL MEDICINE

## 2023-10-04 PROCEDURE — 258N000003 HC RX IP 258 OP 636: Performed by: INTERNAL MEDICINE

## 2023-10-04 PROCEDURE — 200N000001 HC R&B ICU

## 2023-10-04 PROCEDURE — 83735 ASSAY OF MAGNESIUM: CPT | Performed by: INTERNAL MEDICINE

## 2023-10-04 PROCEDURE — 84100 ASSAY OF PHOSPHORUS: CPT | Performed by: PEDIATRICS

## 2023-10-04 PROCEDURE — 94640 AIRWAY INHALATION TREATMENT: CPT

## 2023-10-04 PROCEDURE — 99232 SBSQ HOSP IP/OBS MODERATE 35: CPT | Performed by: INTERNAL MEDICINE

## 2023-10-04 PROCEDURE — 80048 BASIC METABOLIC PNL TOTAL CA: CPT | Performed by: INTERNAL MEDICINE

## 2023-10-04 PROCEDURE — 250N000013 HC RX MED GY IP 250 OP 250 PS 637: Performed by: INTERNAL MEDICINE

## 2023-10-04 PROCEDURE — 36415 COLL VENOUS BLD VENIPUNCTURE: CPT | Performed by: INTERNAL MEDICINE

## 2023-10-04 RX ORDER — SODIUM CHLORIDE 9 MG/ML
INJECTION, SOLUTION INTRAVENOUS CONTINUOUS
Status: DISCONTINUED | OUTPATIENT
Start: 2023-10-04 | End: 2023-10-06 | Stop reason: HOSPADM

## 2023-10-04 RX ADMIN — INSULIN ASPART 4 UNITS: 100 INJECTION, SOLUTION INTRAVENOUS; SUBCUTANEOUS at 08:27

## 2023-10-04 RX ADMIN — IPRATROPIUM BROMIDE AND ALBUTEROL SULFATE 3 ML: 2.5; .5 SOLUTION RESPIRATORY (INHALATION) at 11:04

## 2023-10-04 RX ADMIN — SODIUM CHLORIDE: 9 INJECTION, SOLUTION INTRAVENOUS at 15:15

## 2023-10-04 RX ADMIN — ENOXAPARIN SODIUM 40 MG: 40 INJECTION SUBCUTANEOUS at 08:26

## 2023-10-04 RX ADMIN — METOPROLOL SUCCINATE 50 MG: 50 TABLET, EXTENDED RELEASE ORAL at 08:27

## 2023-10-04 RX ADMIN — INSULIN ASPART 4 UNITS: 100 INJECTION, SOLUTION INTRAVENOUS; SUBCUTANEOUS at 12:35

## 2023-10-04 RX ADMIN — IPRATROPIUM BROMIDE AND ALBUTEROL SULFATE 3 ML: 2.5; .5 SOLUTION RESPIRATORY (INHALATION) at 07:56

## 2023-10-04 RX ADMIN — PAROXETINE 30 MG: 20 TABLET, FILM COATED ORAL at 08:26

## 2023-10-04 ASSESSMENT — ACTIVITIES OF DAILY LIVING (ADL)
ADLS_ACUITY_SCORE: 20

## 2023-10-04 NOTE — PLAN OF CARE
"  Problem: Plan of Care - These are the overarching goals to be used throughout the patient stay.    Goal: Plan of Care Review  Description: The Plan of Care Review/Shift note should be completed every shift.  The Outcome Evaluation is a brief statement about your assessment that the patient is improving, declining, or no change.  This information will be displayed automatically on your shift note.  Outcome: Progressing  Goal: Patient-Specific Goal (Individualized)  Description: You can add care plan individualizations to a care plan. Examples of Individualization might be:  \"Parent requests to be called daily at 9am for status\", \"I have a hard time hearing out of my right ear\", or \"Do not touch me to wake me up as it startles me\".  Outcome: Progressing  Goal: Absence of Hospital-Acquired Illness or Injury  Outcome: Progressing  Intervention: Identify and Manage Fall Risk  Recent Flowsheet Documentation  Taken 10/4/2023 0400 by Rodger Mcmanus, RN  Safety Promotion/Fall Prevention:   activity supervised   clutter free environment maintained   lighting adjusted   nonskid shoes/slippers when out of bed   room near nurse's station   room organization consistent   safety round/check completed   supervised activity  Taken 10/4/2023 0000 by Rodger Mcmanus, RN  Safety Promotion/Fall Prevention:   activity supervised   clutter free environment maintained   lighting adjusted   nonskid shoes/slippers when out of bed   room near nurse's station   room organization consistent   safety round/check completed   supervised activity  Taken 10/3/2023 2000 by Rodger Mcmanus, RN  Safety Promotion/Fall Prevention:   activity supervised   clutter free environment maintained   lighting adjusted   nonskid shoes/slippers when out of bed   room near nurse's station   room organization consistent   safety round/check completed   supervised activity  Intervention: Prevent Skin Injury  Recent Flowsheet Documentation  Taken 10/4/2023 0400 by " Rodger Mcmanus RN  Body Position: position changed independently  Taken 10/4/2023 0000 by Rodger Mcmanus RN  Body Position: position changed independently  Taken 10/3/2023 2000 by Rodger Mcmanus RN  Body Position: position changed independently  Intervention: Prevent and Manage VTE (Venous Thromboembolism) Risk  Recent Flowsheet Documentation  Taken 10/4/2023 0400 by Rodger Mcmanus RN  VTE Prevention/Management: other (see comments)  Taken 10/4/2023 0000 by Rodger Mcmanus RN  VTE Prevention/Management: other (see comments)  Taken 10/3/2023 2000 by Rodger Mcmanus RN  VTE Prevention/Management: other (see comments)  Goal: Optimal Comfort and Wellbeing  Outcome: Progressing  Goal: Readiness for Transition of Care  Outcome: Progressing     Problem: COPD (Chronic Obstructive Pulmonary Disease) Comorbidity  Goal: Maintenance of COPD Symptom Control  Outcome: Progressing  Intervention: Maintain COPD-Symptom Control  Recent Flowsheet Documentation  Taken 10/4/2023 0400 by Rodger Mcmanus RN  Medication Review/Management: medications reviewed  Taken 10/4/2023 0000 by Rodger Mcmanus RN  Medication Review/Management: medications reviewed  Taken 10/3/2023 2000 by Rodger Mcmanus RN  Medication Review/Management: medications reviewed     Problem: Diabetes Comorbidity  Goal: Blood Glucose Level Within Targeted Range  Outcome: Progressing     Problem: Hypertension Comorbidity  Goal: Blood Pressure in Desired Range  Outcome: Progressing  Intervention: Maintain Blood Pressure Management  Recent Flowsheet Documentation  Taken 10/4/2023 0400 by Rodger Mcmanus RN  Medication Review/Management: medications reviewed  Taken 10/4/2023 0000 by Rodger Mcmanus RN  Medication Review/Management: medications reviewed  Taken 10/3/2023 2000 by Rodger Mcmanus RN  Medication Review/Management: medications reviewed     Problem: Fall Injury Risk  Goal: Absence of Fall and Fall-Related Injury  Outcome: Progressing  Intervention: Identify and  "Manage Contributors  Recent Flowsheet Documentation  Taken 10/4/2023 0400 by Rodger Mcmanus RN  Medication Review/Management: medications reviewed  Taken 10/4/2023 0000 by Rodger Mcmanus RN  Medication Review/Management: medications reviewed  Taken 10/3/2023 2000 by Rodger Mcmanus RN  Medication Review/Management: medications reviewed  Intervention: Promote Injury-Free Environment  Recent Flowsheet Documentation  Taken 10/4/2023 0400 by Rodger Mcmanus RN  Safety Promotion/Fall Prevention:   activity supervised   clutter free environment maintained   lighting adjusted   nonskid shoes/slippers when out of bed   room near nurse's station   room organization consistent   safety round/check completed   supervised activity  Taken 10/4/2023 0000 by Rodger Mcmanus RN  Safety Promotion/Fall Prevention:   activity supervised   clutter free environment maintained   lighting adjusted   nonskid shoes/slippers when out of bed   room near nurse's station   room organization consistent   safety round/check completed   supervised activity  Taken 10/3/2023 2000 by Rodger Mcmanus RN  Safety Promotion/Fall Prevention:   activity supervised   clutter free environment maintained   lighting adjusted   nonskid shoes/slippers when out of bed   room near nurse's station   room organization consistent   safety round/check completed   supervised activity     Problem: Oral Intake Inadequate  Goal: Improved Oral Intake  Outcome: Progressing     Problem: Infection  Goal: Absence of Infection Signs and Symptoms  Outcome: Progressing       Blood sugars this shift were 293 and 251.   Pt has been up stand by assist and voiding well.   No complaints.   Vitals have been stable with an elevated blood pressure.  BP (!) 151/101   Pulse 56   Temp 97.9  F (36.6  C) (Oral)   Resp 16   Ht 1.803 m (5' 11\")   Wt 81.1 kg (178 lb 14.4 oz)   SpO2 100%   BMI 24.95 kg/m    IV fluids discontinued by Provider.  Telemetry has shown sinus bradycardia. "   Will continue to monitor and follow plan of care.

## 2023-10-04 NOTE — PLAN OF CARE
Goal Outcome Evaluation:      Plan of Care Reviewed With: patient    Overall Patient Progress: improvingOverall Patient Progress: improving       Neuro: alert and oriented x4  CMS: intact, denies numbness/tingling  Pulmonary: lung sounds clear throughout on room air  CV: SR/SB   GI: bowels active x4. Good appetite eating % of meals. X1 BM today  : frequent urination due to IV fluids for DKA   Skin: scattered bruising otherwise intact  Pain: right shoulder and back pain due to bed. Declines medications. Stretching and repositioning helps.   Activity: independent in room  Hygiene: independent  Lines/Tubes/Drains: PIV x1  Drips: NS    Plan: monitor blood sugars and treat with insulin when needed. Monitor labs.

## 2023-10-04 NOTE — PROGRESS NOTES
10/04/23 1104   RCAT Assessment   Reason for Assessment COPD   Pulmonary Status 2   Surgical Status 0   Chest X-ray 0   Respiratory Pattern 0   Mental Status 0   Breath Sounds 0   Cough Effectiveness 0   Level of Activity 0   O2 Required for SpO2>=92% 0   Acuity Level (points) 2   Aerosol Therapy (SVN)   Respiratory Treatment Status (SVN) given;adjusted per protocol   Patient Position Helms's   Posttreatment Assessment (SVN) breath sounds unchanged   Signs of Intolerance (SVN) none   Breath Sounds   Breath Sounds All Fields   All Lung Fields Breath Sounds diminished     Discussed Respiratory status with provider  Nebulizer treatments changed to PRN

## 2023-10-04 NOTE — PROGRESS NOTES
[facility]    Medicine Progress Note - Hospitalist Service    Date of Admission:  10/1/2023    Assessment & Plan   63-year-old man with reported history of type 2 diabetes treated chronically with insulin pump, COPD with ongoing tobacco use and recent exacerbation September 27 treated with Zithromax and bronchodilators, hypertension, and apparent history of old MI in 2002 presented to outside ER (in [place]) last night due to cough, weakness, and decreased oral intake.  Upon evaluation in the ER, he was found to be in diabetic ketoacidosis and also had atrial fibrillation with RVR, so hospitalization was advised and he was transferred to this hospital for inpatient admission.    Diabetic ketoacidosis with SIRS  Reported diagnosis type 2 diabetes treated chronically with insulin pump  -Diabetes DKA resolved.  Gap closed.    -Currently on insulin infusion per DKA protocol: We will bridge with basal insulin and DC insulin infusion after 1 to 2 hours.  -Continue IV fluids per DKA protocol: Noted on D5 half NS at 125, will switch IV fluids to NS at same rate when insulin drip discontinued.  -Continue every 4 hourly BMP monitor   -To commence Accu-Cheks AC/at bedtime  -To commence carb controlled diet  -Discussed with patient the need to follow-up with primary endocrinologist to determine whether he will be able to resume use of insulin pump, as insulin pump appears to be malfunctioning.  Discussed with patient that he will be discharged on subcutaneous insulin.  Patient states that he has appointment scheduled with his endocrinologist for end of October.  Discussed with primary bedside RN/charge RN to explore options for earlier appointment for patient upon discharge.    -We will discontinue antibiotic therapy until clear indication: Procalcitonin noted at 0.13.    -Monitor patient in ICU for another 24 hours.  10/4  -Blood sugars still running high.  Noted 311 this AM.  -Continue  basal/sliding scale insulin regimen for now.  Adjust Levemir to 15 units twice daily.  Possibly add prandial insulin depending on trend  -I will continue every 4 BMP checks for now.  To consider insulin infusion as indicated.  -Other management as stated above      Paroxysmal narrow complex tachycardia, suspect PSVT  -HR normal to bradycardia.  Monitor  -Continue chronic dose of metoprolol  -Continuing cardiac monitoring while in ICU  -Optimize electrolytes including magnesium and potassium  10/4  -Resolved.  HR WNL.    Hyponatremia  Hypophosphatemia  -Sodium WNL.  Phosphorus noted at 2.4.  -Monitor   10/4  -Resolved.  Monitor.    Elevated lipase  -Lipase normalized.    COPD with recent exacerbation  Tobacco use  -Stable  -Continue DuoNebs as needed  -Encourage smoking cessation    Elevated troponin  Old MI  Hypertension  -Not continuing to follow troponin levels unless his clinical status changes  -Continue chronic beta-blocker and statin  -Aspirin or other antiplatelet therapy is not reported on his home medication list but could consider adding aspirin therapy for secondary prevention    DVT: Subcutaneous Lovenox  Disposition: We will keep patient another 24 to 48 hours to optimize blood glucose control.  Given possibility of malfunctioning insulin pump, patient will discharge with subcutaneous insulin regimen.  It is reasonable to keep patient in-house to optimize control for exact insulin requirements for discharge.      Darrius Farrell DO  Hospitalist Service  [facility]  Securely message with [employer] (more info)  Text page via [employer] Paging/Directory     ________________________________________________  Interval History   Patient denies any new complaints.  No acute events overnight.  .    Physical Exam   Vital Signs: Temp: 97.8  F (36.6  C) Temp src: Oral BP: 123/78 Pulse: 75   Resp: 25 SpO2: 98 % O2 Device: None (Room air)    Patient Vitals for the past 24 hrs:   BP Temp Temp  src Pulse Resp SpO2   10/04/23 0822 123/78 97.8  F (36.6  C) Oral 75 25 98 %   10/04/23 0756 -- -- -- 70 18 99 %   10/04/23 0600 -- -- -- 55 17 --   10/04/23 0500 -- -- -- 59 11 --   10/04/23 0400 -- -- -- 56 16 --   10/04/23 0339 (!) 151/101 97.9  F (36.6  C) Oral 77 25 100 %   10/04/23 0300 -- -- -- 57 16 --   10/04/23 0200 -- -- -- 56 20 --   10/04/23 0100 -- -- -- 59 18 --   10/04/23 0032 136/89 98.5  F (36.9  C) Oral 64 19 99 %   10/04/23 0000 -- -- -- 60 17 --   10/03/23 2300 -- -- -- 60 15 --   10/03/23 2200 -- -- -- 64 20 --   10/03/23 2132 (!) 137/91 98.4  F (36.9  C) Oral 63 21 95 %   10/03/23 2100 -- -- -- 65 11 --   10/03/23 2000 -- -- -- 66 19 --   10/03/23 1600 (!) 130/90 98.5  F (36.9  C) Oral 60 18 100 %   10/03/23 1206 110/79 97.5  F (36.4  C) Oral 57 15 98 %       Weight: 178 lbs 14.4 oz        Data     I have personally reviewed the following data over the past 24 hrs:    N/A  \   N/A   / N/A     137 105 11.3 /  311 (H)   4.2 16 (L) 0.95 \       Blood sugars ranged 135-208 overnight on insulin infusion, hemoglobin A1c was 10.8    Blood cultures were obtained in outside emergency room last night    Cardiac monitor results reviewed since admission: Normal sinus rhythm, no arrhythmias    Cardiac monitor strip from outside ER last night just before 10 PM reviewed: Regular narrow complex tachycardia at heart rate approximately 200 without visible P waves    EKGs from outside ER last night reviewed: Normal sinus rhythm with PACs    Imaging results reviewed over the past 24 hrs:   No results found for this or any previous visit (from the past 24 hour(s)).    Recent Labs   Lab 10/04/23  0826 10/04/23  0157 10/03/23  2128 10/03/23  1838 10/03/23  1813 10/03/23  1409 10/03/23  1402 10/03/23  1102 10/03/23  1046 10/03/23  0605 10/03/23  0522 10/02/23  0634 10/02/23  0530 10/02/23  0341 10/02/23  0305 10/02/23  0135 09/27/23  2252   WBC  --   --   --   --   --   --   --   --   --   --  8.1  --  12.1*  --   --    --  7.9   HGB  --   --   --   --   --   --   --   --   --   --   --   --  15.9  --   --   --  16.2   MCV  --   --   --   --   --   --   --   --   --   --   --   --  87  --   --   --  87   PLT  --   --   --   --   --   --   --   --   --   --   --   --  199  --   --   --  185   NA  --   --   --   --  137  --  139  --  140  --  138   < > 132*  --  134*  --  133*   POTASSIUM  --   --   --   --  4.2  --  4.0  --  4.7  --  3.4  3.4   < > 3.8  --  3.8  --  3.9   CHLORIDE  --   --   --   --  105  --  109*  --  110*  --  107   < > 103  --  105  --  97*   CO2  --   --   --   --  16*  --  15*  --  19*  --  19*   < > 15*  --  15*  --  22   BUN  --   --   --   --  11.3  --  11.5  --  12.2  --  13.0   < > 21.0  --  21.1  --  16.8   CR  --   --   --   --  0.95  --  0.87  --  0.91  --  0.98   < > 1.05  --  1.02  --  0.95   ANIONGAP  --   --   --   --  16*  --  15  --  11  --  12   < > 14  --  14  --  14   REGINALD  --   --   --   --  9.0  --  9.0  --  9.1  --  9.0   < > 9.5  --  9.7  --  9.6   * 251* 293*   < > 249*   < > 166*   < > 121*   < > 152*   < > 174*   < > 137*   < > 419*   ALBUMIN  --   --   --   --   --   --   --   --   --   --  3.1*  --   --   --  3.7  --  4.4   PROTTOTAL  --   --   --   --   --   --   --   --   --   --  5.3*  --   --   --  6.3*  --  7.0   BILITOTAL  --   --   --   --   --   --   --   --   --   --  0.6  --   --   --  0.6  --  0.7   ALKPHOS  --   --   --   --   --   --   --   --   --   --  91  --   --   --  110  --  114   ALT  --   --   --   --   --   --   --   --   --   --  10  --   --   --  11  --  13   AST  --   --   --   --   --   --   --   --   --   --  16  --   --   --  19  --  16   LIPASE  --   --   --   --   --   --   --   --   --   --  58  --   --   --  1,364*  --   --     < > = values in this interval not displayed.

## 2023-10-05 LAB
ANION GAP SERPL CALCULATED.3IONS-SCNC: 10 MMOL/L (ref 7–15)
ANION GAP SERPL CALCULATED.3IONS-SCNC: 11 MMOL/L (ref 7–15)
ANION GAP SERPL CALCULATED.3IONS-SCNC: 12 MMOL/L (ref 7–15)
ANION GAP SERPL CALCULATED.3IONS-SCNC: 13 MMOL/L (ref 7–15)
ANION GAP SERPL CALCULATED.3IONS-SCNC: 8 MMOL/L (ref 7–15)
BUN SERPL-MCNC: 14.8 MG/DL (ref 8–23)
BUN SERPL-MCNC: 15 MG/DL (ref 8–23)
BUN SERPL-MCNC: 16.3 MG/DL (ref 8–23)
BUN SERPL-MCNC: 16.8 MG/DL (ref 8–23)
BUN SERPL-MCNC: 18.8 MG/DL (ref 8–23)
CALCIUM SERPL-MCNC: 9 MG/DL (ref 8.8–10.2)
CALCIUM SERPL-MCNC: 9.2 MG/DL (ref 8.8–10.2)
CHLORIDE SERPL-SCNC: 101 MMOL/L (ref 98–107)
CHLORIDE SERPL-SCNC: 102 MMOL/L (ref 98–107)
CHLORIDE SERPL-SCNC: 103 MMOL/L (ref 98–107)
CREAT SERPL-MCNC: 0.89 MG/DL (ref 0.67–1.17)
CREAT SERPL-MCNC: 0.93 MG/DL (ref 0.67–1.17)
CREAT SERPL-MCNC: 0.94 MG/DL (ref 0.67–1.17)
CREAT SERPL-MCNC: 0.94 MG/DL (ref 0.67–1.17)
CREAT SERPL-MCNC: 1 MG/DL (ref 0.67–1.17)
DEPRECATED HCO3 PLAS-SCNC: 23 MMOL/L (ref 22–29)
DEPRECATED HCO3 PLAS-SCNC: 26 MMOL/L (ref 22–29)
DEPRECATED HCO3 PLAS-SCNC: 29 MMOL/L (ref 22–29)
EGFRCR SERPLBLD CKD-EPI 2021: 85 ML/MIN/1.73M2
EGFRCR SERPLBLD CKD-EPI 2021: >90 ML/MIN/1.73M2
GLUCOSE BLDC GLUCOMTR-MCNC: 136 MG/DL (ref 70–99)
GLUCOSE BLDC GLUCOMTR-MCNC: 172 MG/DL (ref 70–99)
GLUCOSE BLDC GLUCOMTR-MCNC: 216 MG/DL (ref 70–99)
GLUCOSE BLDC GLUCOMTR-MCNC: 305 MG/DL (ref 70–99)
GLUCOSE SERPL-MCNC: 155 MG/DL (ref 70–99)
GLUCOSE SERPL-MCNC: 189 MG/DL (ref 70–99)
GLUCOSE SERPL-MCNC: 217 MG/DL (ref 70–99)
GLUCOSE SERPL-MCNC: 233 MG/DL (ref 70–99)
GLUCOSE SERPL-MCNC: 236 MG/DL (ref 70–99)
PHOSPHATE SERPL-MCNC: 3.5 MG/DL (ref 2.5–4.5)
PLATELET # BLD AUTO: 162 10E3/UL (ref 150–450)
POTASSIUM SERPL-SCNC: 3.2 MMOL/L (ref 3.4–5.3)
POTASSIUM SERPL-SCNC: 3.2 MMOL/L (ref 3.4–5.3)
POTASSIUM SERPL-SCNC: 3.6 MMOL/L (ref 3.4–5.3)
POTASSIUM SERPL-SCNC: 3.7 MMOL/L (ref 3.4–5.3)
POTASSIUM SERPL-SCNC: 3.8 MMOL/L (ref 3.4–5.3)
SODIUM SERPL-SCNC: 138 MMOL/L (ref 135–145)
SODIUM SERPL-SCNC: 139 MMOL/L (ref 135–145)
SODIUM SERPL-SCNC: 139 MMOL/L (ref 135–145)
SODIUM SERPL-SCNC: 140 MMOL/L (ref 135–145)
SODIUM SERPL-SCNC: 140 MMOL/L (ref 135–145)

## 2023-10-05 PROCEDURE — 250N000011 HC RX IP 250 OP 636: Mod: JZ | Performed by: INTERNAL MEDICINE

## 2023-10-05 PROCEDURE — 36415 COLL VENOUS BLD VENIPUNCTURE: CPT | Performed by: INTERNAL MEDICINE

## 2023-10-05 PROCEDURE — 250N000013 HC RX MED GY IP 250 OP 250 PS 637: Performed by: PEDIATRICS

## 2023-10-05 PROCEDURE — 80048 BASIC METABOLIC PNL TOTAL CA: CPT | Performed by: INTERNAL MEDICINE

## 2023-10-05 PROCEDURE — 120N000001 HC R&B MED SURG/OB

## 2023-10-05 PROCEDURE — 84100 ASSAY OF PHOSPHORUS: CPT | Performed by: PEDIATRICS

## 2023-10-05 PROCEDURE — 250N000013 HC RX MED GY IP 250 OP 250 PS 637: Performed by: INTERNAL MEDICINE

## 2023-10-05 PROCEDURE — 85049 AUTOMATED PLATELET COUNT: CPT | Performed by: INTERNAL MEDICINE

## 2023-10-05 PROCEDURE — 99232 SBSQ HOSP IP/OBS MODERATE 35: CPT | Performed by: INTERNAL MEDICINE

## 2023-10-05 RX ORDER — POTASSIUM CHLORIDE 1500 MG/1
40 TABLET, EXTENDED RELEASE ORAL ONCE
Status: COMPLETED | OUTPATIENT
Start: 2023-10-05 | End: 2023-10-05

## 2023-10-05 RX ADMIN — METOPROLOL SUCCINATE 50 MG: 50 TABLET, EXTENDED RELEASE ORAL at 08:35

## 2023-10-05 RX ADMIN — ENOXAPARIN SODIUM 40 MG: 40 INJECTION SUBCUTANEOUS at 08:36

## 2023-10-05 RX ADMIN — PAROXETINE 30 MG: 20 TABLET, FILM COATED ORAL at 08:35

## 2023-10-05 RX ADMIN — POTASSIUM CHLORIDE 40 MEQ: 1500 TABLET, EXTENDED RELEASE ORAL at 06:30

## 2023-10-05 ASSESSMENT — ACTIVITIES OF DAILY LIVING (ADL)
ADLS_ACUITY_SCORE: 20

## 2023-10-05 NOTE — PLAN OF CARE
"  Problem: Plan of Care - These are the overarching goals to be used throughout the patient stay.    Goal: Plan of Care Review  Description: The Plan of Care Review/Shift note should be completed every shift.  The Outcome Evaluation is a brief statement about your assessment that the patient is improving, declining, or no change.  This information will be displayed automatically on your shift note.  Outcome: Progressing  Goal: Patient-Specific Goal (Individualized)  Description: You can add care plan individualizations to a care plan. Examples of Individualization might be:  \"Parent requests to be called daily at 9am for status\", \"I have a hard time hearing out of my right ear\", or \"Do not touch me to wake me up as it startles me\".  Outcome: Progressing  Goal: Absence of Hospital-Acquired Illness or Injury  Outcome: Progressing  Intervention: Identify and Manage Fall Risk  Recent Flowsheet Documentation  Taken 10/5/2023 0100 by Rodger Mcmanus RN  Safety Promotion/Fall Prevention:   activity supervised   nonskid shoes/slippers when out of bed   room near nurse's station   room organization consistent   safety round/check completed   supervised activity  Taken 10/4/2023 2000 by Rodger Mcmanus RN  Safety Promotion/Fall Prevention:   activity supervised   nonskid shoes/slippers when out of bed   room near nurse's station   room organization consistent   safety round/check completed   supervised activity  Intervention: Prevent and Manage VTE (Venous Thromboembolism) Risk  Recent Flowsheet Documentation  Taken 10/5/2023 0100 by Rodger Mcmanus RN  VTE Prevention/Management: other (see comments)  Taken 10/4/2023 2000 by Rodger Mcmanus RN  VTE Prevention/Management: other (see comments)  Goal: Optimal Comfort and Wellbeing  Outcome: Progressing  Goal: Readiness for Transition of Care  Outcome: Progressing     Problem: COPD (Chronic Obstructive Pulmonary Disease) Comorbidity  Goal: Maintenance of COPD Symptom " Control  Outcome: Progressing  Intervention: Maintain COPD-Symptom Control  Recent Flowsheet Documentation  Taken 10/5/2023 0100 by Rodger Mcmanus RN  Medication Review/Management: medications reviewed  Taken 10/4/2023 2000 by Rodger Mcmanus RN  Medication Review/Management: medications reviewed     Problem: Diabetes Comorbidity  Goal: Blood Glucose Level Within Targeted Range  Outcome: Progressing     Problem: Hypertension Comorbidity  Goal: Blood Pressure in Desired Range  Outcome: Progressing  Intervention: Maintain Blood Pressure Management  Recent Flowsheet Documentation  Taken 10/5/2023 0100 by Rodger Mcmanus RN  Medication Review/Management: medications reviewed  Taken 10/4/2023 2000 by Rodger Mcmanus RN  Medication Review/Management: medications reviewed     Problem: Fall Injury Risk  Goal: Absence of Fall and Fall-Related Injury  Outcome: Progressing  Intervention: Identify and Manage Contributors  Recent Flowsheet Documentation  Taken 10/5/2023 0100 by Rodger Mcmanus RN  Medication Review/Management: medications reviewed  Taken 10/4/2023 2000 by Rodger Mcmanus RN  Medication Review/Management: medications reviewed  Intervention: Promote Injury-Free Environment  Recent Flowsheet Documentation  Taken 10/5/2023 0100 by Rodger Mcmanus RN  Safety Promotion/Fall Prevention:   activity supervised   nonskid shoes/slippers when out of bed   room near nurse's station   room organization consistent   safety round/check completed   supervised activity  Taken 10/4/2023 2000 by Rodger Mcmanus RN  Safety Promotion/Fall Prevention:   activity supervised   nonskid shoes/slippers when out of bed   room near nurse's station   room organization consistent   safety round/check completed   supervised activity     Problem: Oral Intake Inadequate  Goal: Improved Oral Intake  Outcome: Progressing     Problem: Infection  Goal: Absence of Infection Signs and Symptoms  Outcome: Progressing       Blood sugar was elevated and  "covered this shift, was 189 at last check.   IV fluids were stopped.   Pt has been up independently.   Vitals are stable.   /89   Pulse 50   Temp 98.6  F (37  C) (Oral)   Resp 12   Ht 1.803 m (5' 11\")   Wt 81.1 kg (178 lb 14.4 oz)   SpO2 98%   BMI 24.95 kg/m    Telemetry shows sinus Bradycardia.   Will continue to monitor and follow plan of care.  "

## 2023-10-05 NOTE — MEDICATION SCRIBE - ADMISSION MEDICATION HISTORY
Medication Scribe Admission Medication History    Admission medication history is complete. The information provided in this note is only as accurate as the sources available at the time of the update.    Information Source(s): Patient via in-person    Pertinent Information: inpatient    Changes made to PTA medication list:  Added: None  Deleted: lipitor, hyzaar, ozempic  Changed: None    Medication Affordability:  Not including over the counter (OTC) medications, was there a time in the past 3 months when you did not take your medications as prescribed because of cost?: Yes    Allergies reviewed with patient and updates made in EHR: yes    Medication History Completed By: ANDREA BARAJAS 10/4/2023 7:39 PM    PTA Med List   Medication Sig Note Last Dose    amoxicillin (AMOXIL) 500 MG capsule Take 2,000 mg by mouth once as needed (dental appointment)  More than a month at [facility]    [] azithromycin (ZITHROMAX) 250 MG tablet Take 1 tablet (250 mg) by mouth daily for 4 days 10/4/2023: COPD 10/1/2023    blood glucose (ACCU-CHEK GUIDE) test strip 1 strip by In Vitro route 3 times daily  10/1/2023 at 1200    insulin lispro (HUMALOG VIAL) 100 UNIT/ML vial Inject 70 Units Subcutaneous daily Maximum in pump  10/1/2023 at am    ipratropium - albuterol 0.5 mg/2.5 mg/3 mL (DUONEB) 0.5-2.5 (3) MG/3ML neb solution Take 1 vial (3 mLs) by nebulization every 6 hours as needed for shortness of breath, wheezing or cough  Past Week at [facility]    metoprolol succinate ER (TOPROL XL) 50 MG 24 hr tablet Take 1 tablet by mouth daily  10/1/2023 at am    PARoxetine (PAXIL) 30 MG tablet Take 30 mg by mouth daily  10/1/2023 at am    Continuous Blood Gluc  (FREESTYLE KATHYA 14 DAY READER) CHARLA Use to read blood sugars as per 's instructions.   at not on

## 2023-10-05 NOTE — PROGRESS NOTES
[facility]    Medicine Progress Note - Hospitalist Service    Date of Admission:  10/1/2023    Assessment & Plan   63-year-old man with reported history of type 2 diabetes treated chronically with insulin pump, COPD with ongoing tobacco use and recent exacerbation September 27 treated with Zithromax and bronchodilators, hypertension, and apparent history of old MI in 2002 presented to outside ER (in [place]) last night due to cough, weakness, and decreased oral intake.  Upon evaluation in the ER, he was found to be in diabetic ketoacidosis and also had atrial fibrillation with RVR, so hospitalization was advised and he was transferred to this hospital for inpatient admission.    Diabetic ketoacidosis with SIRS  Reported diagnosis type 2 diabetes treated chronically with insulin pump  -Diabetes DKA resolved.  Gap closed.    -Currently on insulin infusion per DKA protocol: We will bridge with basal insulin and DC insulin infusion after 1 to 2 hours.  -Continue IV fluids per DKA protocol: Noted on D5 half NS at 125, will switch IV fluids to NS at same rate when insulin drip discontinued.  -Continue every 4 hourly BMP monitor   -To commence Accu-Cheks AC/at bedtime  -To commence carb controlled diet  -Discussed with patient the need to follow-up with primary endocrinologist to determine whether he will be able to resume use of insulin pump, as insulin pump appears to be malfunctioning.  Discussed with patient that he will be discharged on subcutaneous insulin.  Patient states that he has appointment scheduled with his endocrinologist for end of October.  Discussed with primary bedside RN/charge RN to explore options for earlier appointment for patient upon discharge.    -We will discontinue antibiotic therapy until clear indication: Procalcitonin noted at 0.13.    -Monitor patient in ICU for another 24 hours.  10/4  -Blood sugars still running high.  Noted 311 this AM.  -Continue  basal/sliding scale insulin regimen for now.  Adjust Levemir to 15 units twice daily.  Possibly add prandial insulin depending on trend  -I will continue every 4 BMP checks for now.  To consider insulin infusion as indicated.  -Other management as stated above  10/5  -DKA resolved.  However blood sugars still considerably above 180 mg/DL.  Currently on 15 units Levemir twice daily and high sliding scale.  Given that patient will be discharging with subcutaneous insulin regimen, it is reasonable to optimize blood glucose control prior to discharge.  Insulin pump faulty.  Patient next visit with endocrinology is at the end of the month.  -Will increase Levemir to 20 twice daily.  Continue to optimize blood glucose control.  -Other management as stated above.      Paroxysmal narrow complex tachycardia, suspect PSVT  -HR normal to bradycardia.  Monitor  -Continue chronic dose of metoprolol  -Continuing cardiac monitoring while in ICU  -Optimize electrolytes including magnesium and potassium  10/4  -Resolved.  HR WNL.    Hyponatremia  Hypophosphatemia  -Sodium WNL.  Phosphorus noted at 2.4.  -Monitor   10/4  -Resolved.  Monitor.    Elevated lipase  -Lipase normalized.    COPD with recent exacerbation  Tobacco use  -Stable  -Continue DuoNebs as needed  -Encourage smoking cessation    Elevated troponin  Old MI  Hypertension  -Not continuing to follow troponin levels unless his clinical status changes  -Continue chronic beta-blocker and statin  -Aspirin or other antiplatelet therapy is not reported on his home medication list but could consider adding aspirin therapy for secondary prevention    DVT: Subcutaneous Lovenox  Disposition: We will keep patient another 24 to 48 hours to optimize blood glucose control.  Given possibility of malfunctioning insulin pump, patient will discharge with subcutaneous insulin regimen.  It is reasonable to keep patient in-house to optimize control for exact insulin requirements for  discharge.      Darrius Farrell DO  Hospitalist Service  [facility]  Securely message with [employer] (more info)  Text page via [employer] Paging/Directory     ________________________________________________  Interval History   Patient denies any new complaints.  No acute events overnight.  .    Physical Exam   Vital Signs: Temp: 98.6  F (37  C) Temp src: Oral BP: 128/88 Pulse: 70   Resp: 15 SpO2: 97 % O2 Device: None (Room air)    Patient Vitals for the past 24 hrs:   BP Temp Temp src Pulse Resp SpO2   10/05/23 0839 -- -- -- 70 -- --   10/05/23 0835 -- -- -- 63 -- --   10/05/23 0813 128/88 98.6  F (37  C) Oral 52 15 97 %   10/05/23 0514 128/87 97.9  F (36.6  C) Oral 56 15 98 %   10/05/23 0500 -- -- -- 50 12 --   10/05/23 0400 -- -- -- 53 15 --   10/05/23 0300 -- -- -- 53 14 --   10/05/23 0200 -- -- -- 51 19 --   10/05/23 0100 -- -- -- 54 16 --   10/05/23 0000 -- -- -- 54 12 --   10/04/23 2200 -- -- -- 72 13 --   10/04/23 2000 -- -- -- 61 14 --   10/04/23 1917 128/89 98.6  F (37  C) Oral 58 11 98 %   10/04/23 1900 -- -- -- 61 18 --   10/04/23 1645 121/71 98.4  F (36.9  C) Oral -- -- 99 %   10/04/23 1230 120/76 -- -- 75 23 95 %   10/04/23 1200 -- 98.8  F (37.1  C) Oral -- -- --       Weight: 178 lbs 14.4 oz        Data     I have personally reviewed the following data over the past 24 hrs:    N/A  \   N/A   / 162     140 103 15.0 /  217 (H)   3.8 29 1.00 \       Blood sugars ranged 135-208 overnight on insulin infusion, hemoglobin A1c was 10.8    Blood cultures were obtained in outside emergency room last night    Cardiac monitor results reviewed since admission: Normal sinus rhythm, no arrhythmias    Cardiac monitor strip from outside ER last night just before 10 PM reviewed: Regular narrow complex tachycardia at heart rate approximately 200 without visible P waves    EKGs from outside ER last night reviewed: Normal sinus rhythm with PACs    Imaging results reviewed over the past 24 hrs:    No results found for this or any previous visit (from the past 24 hour(s)).    Recent Labs   Lab 10/05/23  0959 10/05/23  0815 10/05/23  0514 10/05/23  0210 10/03/23  0605 10/03/23  0522 10/02/23  0634 10/02/23  0530 10/02/23  0341 10/02/23  0305   WBC  --   --   --   --   --  8.1  --  12.1*  --   --    HGB  --   --   --   --   --   --   --  15.9  --   --    MCV  --   --   --   --   --   --   --  87  --   --    PLT  --   --  162  --   --   --   --  199  --   --      --  140 139   < > 138   < > 132*  --  134*   POTASSIUM 3.8  --  3.2* 3.2*   < > 3.4  3.4   < > 3.8  --  3.8   CHLORIDE 103  --  103 103   < > 107   < > 103  --  105   CO2 29  --  26 23   < > 19*   < > 15*  --  15*   BUN 15.0  --  14.8 16.3   < > 13.0   < > 21.0  --  21.1   CR 1.00  --  0.89 0.94   < > 0.98   < > 1.05  --  1.02   ANIONGAP 8  --  11 13   < > 12   < > 14  --  14   REGINALD 9.2  --  9.2 9.2   < > 9.0   < > 9.5  --  9.7   * 136* 155* 189*   < > 152*   < > 174*   < > 137*   ALBUMIN  --   --   --   --   --  3.1*  --   --   --  3.7   PROTTOTAL  --   --   --   --   --  5.3*  --   --   --  6.3*   BILITOTAL  --   --   --   --   --  0.6  --   --   --  0.6   ALKPHOS  --   --   --   --   --  91  --   --   --  110   ALT  --   --   --   --   --  10  --   --   --  11   AST  --   --   --   --   --  16  --   --   --  19   LIPASE  --   --   --   --   --  58  --   --   --  1,364*    < > = values in this interval not displayed.

## 2023-10-05 NOTE — PLAN OF CARE
Goal Outcome Evaluation:           Overall Patient Progress: improvingOverall Patient Progress: improving    Outcome Evaluation: Insulin Detemir increased to 20 units BID by provider. Blood glucose: 136, 216, 172, sliding scale novolog utilized. VSS and afebrile. No complaint of pain. Independent. Good appetite.

## 2023-10-06 VITALS
SYSTOLIC BLOOD PRESSURE: 142 MMHG | WEIGHT: 178.9 LBS | OXYGEN SATURATION: 99 % | RESPIRATION RATE: 20 BRPM | HEART RATE: 54 BPM | HEIGHT: 71 IN | DIASTOLIC BLOOD PRESSURE: 93 MMHG | TEMPERATURE: 97.9 F | BODY MASS INDEX: 25.05 KG/M2

## 2023-10-06 LAB
ANION GAP SERPL CALCULATED.3IONS-SCNC: 12 MMOL/L (ref 7–15)
ANION GAP SERPL CALCULATED.3IONS-SCNC: 9 MMOL/L (ref 7–15)
BASE EXCESS BLDV CALC-SCNC: 7.3 MMOL/L (ref -7.7–1.9)
BUN SERPL-MCNC: 17.8 MG/DL (ref 8–23)
BUN SERPL-MCNC: 22 MG/DL (ref 8–23)
CALCIUM SERPL-MCNC: 9.2 MG/DL (ref 8.8–10.2)
CALCIUM SERPL-MCNC: 9.5 MG/DL (ref 8.8–10.2)
CHLORIDE SERPL-SCNC: 101 MMOL/L (ref 98–107)
CHLORIDE SERPL-SCNC: 102 MMOL/L (ref 98–107)
CREAT SERPL-MCNC: 0.97 MG/DL (ref 0.67–1.17)
CREAT SERPL-MCNC: 0.98 MG/DL (ref 0.67–1.17)
DEPRECATED HCO3 PLAS-SCNC: 26 MMOL/L (ref 22–29)
DEPRECATED HCO3 PLAS-SCNC: 30 MMOL/L (ref 22–29)
EGFRCR SERPLBLD CKD-EPI 2021: 87 ML/MIN/1.73M2
EGFRCR SERPLBLD CKD-EPI 2021: 88 ML/MIN/1.73M2
GLUCOSE BLDC GLUCOMTR-MCNC: 100 MG/DL (ref 70–99)
GLUCOSE BLDC GLUCOMTR-MCNC: 183 MG/DL (ref 70–99)
GLUCOSE BLDC GLUCOMTR-MCNC: 244 MG/DL (ref 70–99)
GLUCOSE BLDC GLUCOMTR-MCNC: 73 MG/DL (ref 70–99)
GLUCOSE BLDC GLUCOMTR-MCNC: 86 MG/DL (ref 70–99)
GLUCOSE SERPL-MCNC: 260 MG/DL (ref 70–99)
GLUCOSE SERPL-MCNC: 97 MG/DL (ref 70–99)
HCO3 BLDV-SCNC: 34 MMOL/L (ref 21–28)
O2/TOTAL GAS SETTING VFR VENT: 21 %
PCO2 BLDV: 57 MM HG (ref 40–50)
PH BLDV: 7.39 [PH] (ref 7.32–7.43)
PHOSPHATE SERPL-MCNC: 4 MG/DL (ref 2.5–4.5)
PO2 BLDV: 28 MM HG (ref 25–47)
POTASSIUM SERPL-SCNC: 3.7 MMOL/L (ref 3.4–5.3)
POTASSIUM SERPL-SCNC: 3.9 MMOL/L (ref 3.4–5.3)
SODIUM SERPL-SCNC: 139 MMOL/L (ref 135–145)
SODIUM SERPL-SCNC: 141 MMOL/L (ref 135–145)

## 2023-10-06 PROCEDURE — 80048 BASIC METABOLIC PNL TOTAL CA: CPT | Performed by: INTERNAL MEDICINE

## 2023-10-06 PROCEDURE — 250N000013 HC RX MED GY IP 250 OP 250 PS 637: Performed by: INTERNAL MEDICINE

## 2023-10-06 PROCEDURE — 80048 BASIC METABOLIC PNL TOTAL CA: CPT | Performed by: FAMILY MEDICINE

## 2023-10-06 PROCEDURE — 84100 ASSAY OF PHOSPHORUS: CPT | Performed by: PEDIATRICS

## 2023-10-06 PROCEDURE — 82803 BLOOD GASES ANY COMBINATION: CPT | Performed by: FAMILY MEDICINE

## 2023-10-06 PROCEDURE — 93005 ELECTROCARDIOGRAM TRACING: CPT

## 2023-10-06 PROCEDURE — 36415 COLL VENOUS BLD VENIPUNCTURE: CPT | Performed by: INTERNAL MEDICINE

## 2023-10-06 PROCEDURE — 36415 COLL VENOUS BLD VENIPUNCTURE: CPT | Performed by: FAMILY MEDICINE

## 2023-10-06 PROCEDURE — 250N000011 HC RX IP 250 OP 636: Mod: JZ | Performed by: INTERNAL MEDICINE

## 2023-10-06 RX ORDER — IBUPROFEN 200 MG
TABLET ORAL
Qty: 120 EACH | Refills: 1 | Status: SHIPPED | OUTPATIENT
Start: 2023-10-06 | End: 2024-07-31

## 2023-10-06 RX ORDER — SYRINGE-NEEDLE,INSULIN,0.5 ML 27GX1/2"
SYRINGE, EMPTY DISPOSABLE MISCELLANEOUS
Qty: 120 EACH | Refills: 1 | Status: SHIPPED | OUTPATIENT
Start: 2023-10-06 | End: 2024-07-31

## 2023-10-06 RX ORDER — BLOOD SUGAR DIAGNOSTIC
STRIP MISCELLANEOUS
Qty: 120 EACH | Refills: 1 | Status: SHIPPED | OUTPATIENT
Start: 2023-10-06 | End: 2024-07-31

## 2023-10-06 RX ORDER — AMIODARONE HYDROCHLORIDE 200 MG/1
200 TABLET ORAL DAILY
Qty: 30 TABLET | Refills: 1 | Status: SHIPPED | OUTPATIENT
Start: 2023-10-06 | End: 2024-07-31

## 2023-10-06 RX ORDER — INSULIN LISPRO 100 [IU]/ML
15 INJECTION, SOLUTION INTRAVENOUS; SUBCUTANEOUS
Qty: 27 ML | Refills: 0 | Status: SHIPPED | OUTPATIENT
Start: 2023-10-06 | End: 2024-07-31

## 2023-10-06 RX ORDER — ASPIRIN 81 MG/1
81 TABLET ORAL DAILY
COMMUNITY
Start: 2023-10-06 | End: 2024-07-31

## 2023-10-06 RX ADMIN — METOPROLOL SUCCINATE 50 MG: 50 TABLET, EXTENDED RELEASE ORAL at 08:51

## 2023-10-06 RX ADMIN — ENOXAPARIN SODIUM 40 MG: 40 INJECTION SUBCUTANEOUS at 08:51

## 2023-10-06 RX ADMIN — PAROXETINE 30 MG: 20 TABLET, FILM COATED ORAL at 08:51

## 2023-10-06 ASSESSMENT — ACTIVITIES OF DAILY LIVING (ADL)
ADLS_ACUITY_SCORE: 20

## 2023-10-06 NOTE — PROGRESS NOTES
Patient this shift alert and oriented, VSS. Blood sugar 301 at HS, received 5 of Novolog and 20 of Levemir. 130 a.m. was at 73, ate a chocolate ice cream (about 15 g carbs) and recheck. Telemetry Sinus Bradycardia with 1 brief strip with Sinus Tachycardia. Shared diabetes resouces with patient, he accepted them but could use some reinforcement. Reporting off to day nurse to reinforce on diabetes education.

## 2023-10-06 NOTE — DISCHARGE INSTRUCTIONS
- Follow-up wti your PCP in 1 - 2 week  - rate vs rhythm control for afib  Currently on rate control with max dose metoprolol and amiodarone  For rhythm control you need cardiology evaluation and insurance of some kind  - to decrease stroke with with intermitent afib - take 81 mg aspirin daily -   The discussion about other medications can be had when you  have more monetary funds    - start on you rold regimen - 27 units of levimir in am or pm ( if in pm have bedtime snack)  - novolog * humalog  - so average should be 10 units per meals estimated.  Based on glucoses here post meals and before meals  - you weigh about 80 kg ( I estimated 10 kg too much) - total needs are short and long acting added up is about 70 - 80 units per day for type 1 diabetes - which is what your were on with pump   Usuall divided in 60 % long acting and total 40 % short acting - so 50 unites per day long acting and 30 units total before meals  Sometimes divided 50 % equally - so 40 units total long acting and 30 units before meals.  Right now you do not tolerate high doses long acting  so start with 27 units long acting in pm or am    Ac1 was >10 this admission  -  Any kind of statin is recommended so lipitor, zocor with DM and. Those should be around $ 10 - 20 per month without insurance - discuss with PCP

## 2023-10-06 NOTE — PROGRESS NOTES
Writer spent time with patient educating with [employer] Diabetes book as well as resources from [employer] website which is  for Novolog and Levemir. Patient reports having internet access at home and ability to complete application at home, did not want to complete right now.   Patient reports understanding of insulin dosing, method of action, signs of high and low blood sugar, and basic understanding of carb counting.   Writer also provided with information on [place] Insulin Safety Net Program.

## 2023-10-06 NOTE — PLAN OF CARE
Goal Outcome Evaluation:      Plan of Care Reviewed With: patient    Overall Patient Progress: no changeOverall Patient Progress: no change    Outcome Evaluation: Pt has been up independently in the room. No complaints. Blood sugar in the 300's tonight. Pt voiced concern about being in the hospital for several days, the medications he's been receiving and having no insurance. Pt reports gambling with his insulin at home prior to hospitalization due to financial concerns and trying to extend time between refills due to cost. Pt reports that  has been helping him try to gain some assistance.

## 2023-10-06 NOTE — DISCHARGE SUMMARY
[facility]  Hospitalist Discharge Summary      Date of Admission:  10/1/2023  Date of Discharge:  10/6/2023  Discharging Provider: Lou Camp MD  Discharge Service: Hospitalist Service    Discharge Diagnoses   Diabetic ketoacidosis secondary to malfunctioning insulin pump.    Monetary issues leading to inadequate number of supplies for insulin pump and insulin pump failure    Intermittent atrial fibrillation with RVR,    Short-lived PSVT of approximately 3 seconds    COPD    Recent tobacco use    Hypertension      Clinically Significant Risk Factors     # DMII: A1C = 10.8 % (Ref range: <5.7 %) within past 6 months       Follow-ups Needed After Discharge   Follow-up Appointments     Follow-up and recommended labs and tests       Follow up with primary care provider, Physician No Ref-Primary, within 7   days to evaluate medication change, for hospital follow- up, regarding new   diagnosis, and follow up for zio patch and /or cardiology referral for   afib with RVR.  The following labs/tests are recommended: holter or   ziopatch for at least 14 days.        Additional follow-up instructions/to-do's for PCP    : Zio patch, discussion of rate versus rhythm control.  Discussion stroke prevention with aspirin versus DOAC.  Patient currently does not have financial means for a DOAC or warfarin      Unresulted Labs Ordered in the Past 30 Days of this Admission       Date and Time Order Name Status Description    10/2/2023  2:52 AM Blood Culture Peripheral Blood Preliminary         These results will be followed up by PCP    Discharge Disposition   Discharged to home  Condition at discharge: Good    Hospital Course   63-year-old man with reported history of type 2 diabetes treated chronically with insulin pump, COPD with ongoing tobacco use and recent exacerbation September 27 treated with Zithromax and bronchodilators, hypertension, and apparent history of old MI in 2002 presented to  outside ER (in [place]) ldue  to cough, weakness, and decreased oral intake.  Upon evaluation in the ER, he was found to be in diabetic ketoacidosis and also had atrial fibrillation with RVR, so hospitalization was advised and he was transferred to this hospital for inpatient admission.    Diabetic ketoacidosis with SIRS  Reported diagnosis type 2 diabetes treated chronically with insulin pump  -Diabetes DKA resolved.  Gap closed.    -Patient is converted to subcutaneous long-acting and short acting, he did not tolerate 20 units of Levemir twice daily, was dropped down to 15 units twice daily but still elevated sugars post meals, I suspect that he is underdosed with a short acting insulin before meals and therefore have increased the units of short acting insulin subcutaneous before meals    Patient's insulin pump is malfunctioning secondary to not having access to supplies secondary to monetary issues.  Patient currently does not have health insurance, and the supplies out-of-pocket are very expensive      Discussed with patient that he will be discharged on subcutaneous insulin.,  Vials will be ordered.  Patient reports that the insulin pens at 1 point in time cost him around $1200 per month.  He relates that he used to take 27 units of long-acting, Levemir, daily and about 10 units subcutaneous before each meal.  I have ordered him vials of Levemir and NovoLog for approximately 2 months total (1 month prescription, 1 month refill     patient states that he has appointment scheduled with his endocrinologist for end of October.   Patient unable to get in to earlier appointments.  In the meanwhile he will follow-up with his PCP whom he trusts and has seen for close to 20 years    Patient likely has type 1 diabetes, suspected total needs for insulin is closer to 70 to 80 units/day based  1 unit/kg per 24-hour model.  Patient clearly needs more than 0.5 units/kg per 24 hours which would be around a total of 40 units  insulin /day.    Family history of adult onset diabetes, and patient without current obesity.    Noncompliance secondary to monetary issue.  Patient will start a new job with a [employer] company early next week, and he will receive healthcare benefits in about 1 month from now  His only other option is to retire early, he will look into this, so he might qualify for earlier Medicare healthcare insurance        Paroxysmal narrow complex tachycardia, suspect PSVT.  These episodes are 2 to 3 seconds.  Patient also has recurrent intermittent atrial fibrillation with RVR, again 2 to 3 seconds total.  Patient is asymptomatic, blood pressure remains hihg    Patient is at the maximum dose of the beta-blocker.  I have added amiodarone 200 mg temporarily orally for the next 2 months.  This advised patient time to pursue rhythm versus rate control.  For rhythm control he probably needs healthcare insurance.  Patient needs to explore with his primary care doctor if he needs an additional Zio patch, he had one in the past, he does not remember how long it was.  At minimum patient needs 14 days if he had a 7-day Zio patch.  Referral to cardiology when he has the financial means  Of note his echo is normal with no wall motion abnormalities, normal valves normal LV function.  He does not need a repeat of this.\\\    -Continue chronic dose of metoprolol\\;  Added aspirin 81 mg daily for stroke prevention, patient unable to afford a DOAC    Hyponatremia  Hypophosphatemia  -Sodium WNL.  Phosphorus noted at 2.4.  -Monitor   10/4  -Resolved.  Monitor.    Elevated lipase  -Lipase normalized.    COPD with recent exacerbation  Tobacco use -patient currently has been smoke-free for at least 1 week.  He was counseled on the risk of stroke and coronary disease which is much higher in patients with diabetes to smoke.  -Stable  -Continue DuoNebs as needed also at home  -Encourage smoking cessation    Elevated troponin  Old  MI  Hypertension  --Continue chronic beta-blocker   Patient not on a statin secondary to financial issues  -  ory     _______________________2_________________________    Consultations This Hospital Stay   None    Code Status   Full Code    Time Spent on this Encounter   I, Lou Camp MD, personally saw the patient today and spent greater than 30 minutes discharging this patient.  Total time spent was closer to 1 hour for further information from patient, discussion, education, stratification and risk versus benefits to try to tide him over until his healthcare insurance kicks in hopefully in a month from now     \  Lou Camp MD  [address] MEDICAL SURGICAL  [address] [place] [address]  Phone: [phone]  ______________________________________________________________________    Physical Exam   Vital Signs: Temp: 97.9  F (36.6  C) Temp src: Oral BP: (!) 142/93 Pulse: 54   Resp: 20 SpO2: 99 % O2 Device: None (Room air)    Weight: 178 lbs 14.4 oz  General Appearance: No acute distress, about stated age, dentition is poor missing upper and lower teeth  Respiratory: Clear to auscultation  Cardiovascular: Regular rate and rhythm.  Mostly sinus bradycardia on telemetry with 2-second burst of PSVT, mostly atrial fibrillation with RVR, short-lived also at 3 seconds  GI: Negative  Skin: No track marks, injection nathanael  Other: Friendly, cooperative, normal mood       Primary Care Physician   Physician No Ref-Primary    Discharge Orders      Reason for your hospital stay    DKA and afib with RVR     Activity    Your activity upon discharge: activity as tolerated     Follow-up and recommended labs and tests     Follow up with primary care provider, Physician No Ref-Primary, within 7 days to evaluate medication change, for hospital follow- up, regarding new diagnosis, and follow up for zio patch and /or cardiology referral for afib with RVR.  The following labs/tests are  recommended: holter or ziopatch for at least 14 days.     Diet    Follow this diet upon discharge: Orders Placed This Encounter      Consistent Carbohydrate Diet Moderate Consistent Carb (60 g CHO per Meal) Diet       Significant Results and Procedures   Most Recent 3 BMP's:  Recent Labs   Lab Test 10/06/23  1653 10/06/23  1643 10/06/23  1132 10/06/23  0749 10/06/23  0549 10/05/23  2055 10/05/23  1801   NA  --  139  --   --  141  --  139   POTASSIUM  --  3.9  --   --  3.7  --  3.6   CHLORIDE  --  101  --   --  102  --  101   CO2  --  26  --   --  30*  --  26   BUN  --  22.0  --   --  17.8  --  18.8   CR  --  0.97  --   --  0.98  --  0.93   ANIONGAP  --  12  --   --  9  --  12   REGINALD  --  9.2  --   --  9.5  --  9.2   * 260* 183*   < > 97   < > 236*    < > = values in this interval not displayed.     Most Recent 3 Hemoglobins:  Recent Labs   Lab Test 10/02/23  0530 23  2252 22  1459   HGB 15.9 16.2 14.7     Most Recent Cholesterol Panel:  Recent Labs   Lab Test 10/02/23  0958   TRIG 104     Most Recent TSH and T4:  Recent Labs   Lab Test 22  1459   TSH 1.08     Most Recent Hemoglobin A1c:  Recent Labs   Lab Test 10/02/23  0530   A1C 10.8*     Most Recent 6 glucoses:  Recent Labs   Lab Test 10/06/23  1653 10/06/23  1643 10/06/23  1132 10/06/23  0749 10/06/23  0549 10/06/23  0202   * 260* 183* 86 97 100*   ,   Results for orders placed or performed during the hospital encounter of 10/01/23   Echocardiogram Complete     Value    LVEF  50-55%    Narrative    [id number]  [id number]  [id number]  [id number]^REAL^CONY^S     [facility]  Echocardiography Laboratory  [address] [place] [address]     Name: LORI SINGH  MRN: [id number]  : 1960  Study Date: 10/02/2023 08:27 AM  Age: 63 yrs  Gender: Male  Patient Location: [facility]  Reason For Study: Atrial Fibrillation  History: copd, hyperlipdemia,htn ,dm  Ordering Physician: CONY CUMMINGS  Performed By:  Linda Currie     BSA: 2.0 m2  Height: 71 in  Weight: 178 lb  HR: 87  BP: 112/78 mmHg  ______________________________________________________________________________  Procedure  Complete Portable Echo Adult. Optison (NDC #[id number]) given intravenously.  ______________________________________________________________________________  Interpretation Summary     1. Left ventricular systolic function is low normal. The visual ejection  fraction is 50-55%.  2. Septal bounc presence. No regional wall motion abnormalities noted.  3. The right ventricle is normal in structure, function and size.  4. No evidence for significant valvular pathology.  ______________________________________________________________________________  Left Ventricle  The left ventricle is normal in size. There is normal left ventricular wall  thickness. The visual ejection fraction is 50-55%. Left ventricular diastolic  function is normal. Left ventricular systolic function is low normal. No  regional wall motion abnormalities noted.     Right Ventricle  The right ventricle is normal in structure, function and size.     Atria  Normal left atrial size. Right atrial size is normal. There is no color  Doppler evidence of an atrial shunt.     Mitral Valve  There is mild mitral annular calcification.     Tricuspid Valve  The tricuspid valve is normal in structure and function. There is trace  tricuspid regurgitation.     Aortic Valve  The aortic valve is trileaflet with aortic valve sclerosis.     Pulmonic Valve  The pulmonic valve is not well seen, but is grossly normal. There is trace  pulmonic valvular regurgitation.     Vessels  Normal size aorta. IVC diameter <2.1 cm collapsing >50% with sniff suggests a  normal RA pressure of 3 mmHg.     Pericardium  There is no pericardial effusion.     Rhythm  Sinus rhythm was noted.  ______________________________________________________________________________  MMode/2D Measurements & Calculations      IVSd: 0.93 cm  LVIDd: 4.1 cm  LVIDs: 3.1 cm  LVPWd: 0.97 cm  FS: 25.0 %  LV mass(C)d: 125.9 grams  LV mass(C)dI: 62.7 grams/m2  Ao root diam: 3.8 cm  LA dimension: 3.0 cm  asc Aorta Diam: 3.7 cm  LA/Ao: 0.79  Ao root diam index Ht(cm/m): 2.1  Ao root diam index BSA (cm/m2): 1.9  Asc Ao diam index BSA (cm/m2): 1.8  Asc Ao diam index Ht(cm/m): 2.1  LA Volume (BP): 65.8 ml     LA Volume Index (BP): 32.7 ml/m2  RWT: 0.47  TAPSE: 2.4 cm     Doppler Measurements & Calculations  MV E max jass: 69.8 cm/sec  MV A max jass: 55.7 cm/sec  MV E/A: 1.3  MV dec time: 0.30 sec  Ao V2 max: 150.9 cm/sec  Ao max P.0 mmHg  LV V1 max P.1 mmHg  LV V1 max: 72.8 cm/sec  PA V2 max: 82.5 cm/sec  PA max P.7 mmHg  PA acc time: 0.13 sec  PI end-d jass: 126.0 cm/sec  TR max jass: 251.0 cm/sec  TR max P.2 mmHg  AV Jass Ratio (DI): 0.48  Medial E/e': 10.0     RV S Jass: 15.7 cm/sec     ______________________________________________________________________________  Report approved by: Randal Blanchard 10/02/2023 10:52 AM             Discharge Medications   Current Discharge Medication List        START taking these medications    Details   amiodarone (PACERONE) 200 MG tablet Take 1 tablet (200 mg) by mouth daily for 60 days  Qty: 30 tablet, Refills: 1    Associated Diagnoses: Atrial fibrillation with RVR (H)      aspirin 81 MG EC tablet Take 1 tablet (81 mg) by mouth daily    Associated Diagnoses: Atrial fibrillation with RVR (H)      insulin detemir (LEVEMIR VIAL) 100 UNIT/ML vial Inject 27 Units Subcutaneous at bedtime for 74 days  Qty: 20 mL, Refills: 0    Associated Diagnoses: Type 2 diabetes mellitus without complication, with long-term current use of insulin (H)      insulin pen needle (29G X 12MM) 29G X 12MM miscellaneous Use 4 pen needles daily or as directed.  Qty: 120 each, Refills: 1    Comments: Draw up  Associated Diagnoses: Type 2 diabetes mellitus without complication, with long-term current use of insulin (H)     "  insulin syringe-needle U-100 (29G X 1/2\" 1 ML) 29G X 1/2\" 1 ML miscellaneous 1 draw up,needle  Qty: 120 each, Refills: 1    Associated Diagnoses: Type 2 diabetes mellitus without complication, with long-term current use of insulin (H)      insulin syringe-needle U-100 (31G X 5/16\" 0.3 ML) 31G X 5/16\" 0.3 ML miscellaneous Use 4 syringes daily or as directed.  Qty: 120 each, Refills: 1    Associated Diagnoses: Type 2 diabetes mellitus without complication, with long-term current use of insulin (H)      insulin syringe-needle U-100 (31G X 5/16\" 1 ML) 31G X 5/16\" 1 ML miscellaneous Use 4 syringes daily or as directed.  Qty: 120 each, Refills: 1    Associated Diagnoses: Type 2 diabetes mellitus without complication, with long-term current use of insulin (H)           CONTINUE these medications which have CHANGED    Details   insulin lispro (HUMALOG VIAL) 100 UNIT/ML vial Inject 15 Units Subcutaneous 3 times daily (before meals) for 60 days Use existing 1 units/gram carb per meal  Qty: 27 mL, Refills: 0    Associated Diagnoses: Type 2 diabetes mellitus without complication, with long-term current use of insulin (H)           CONTINUE these medications which have NOT CHANGED    Details   blood glucose (ACCU-CHEK GUIDE) test strip 1 strip by In Vitro route 3 times daily      ipratropium - albuterol 0.5 mg/2.5 mg/3 mL (DUONEB) 0.5-2.5 (3) MG/3ML neb solution Take 1 vial (3 mLs) by nebulization every 6 hours as needed for shortness of breath, wheezing or cough  Qty: 90 mL, Refills: 0      metoprolol succinate ER (TOPROL XL) 50 MG 24 hr tablet Take 1 tablet by mouth daily      PARoxetine (PAXIL) 30 MG tablet Take 30 mg by mouth daily      Continuous Blood Gluc  (FREESTYLE KATHYA 14 DAY READER) CHARLA Use to read blood sugars as per 's instructions.  Qty: 1 each, Refills: 0           STOP taking these medications       amoxicillin (AMOXIL) 500 MG capsule Comments:   Reason for Stopping:         azithromycin " (ZITHROMAX) 250 MG tablet Comments:   Reason for Stopping:             Allergies   No Known Allergies

## 2023-10-06 NOTE — PROGRESS NOTES
S-(situation): Patient discharged to home via  with family    B-(background): History of weakness and decreased appetite, found to be in DKA.    A-(assessment): Alert and oriented x 4. Vitals are stable on RA. Denies pain, nausea and vomiting. Up independently in the room. Blood sugars have been stable, sliding scale insulin administered as needed per MAR. Tele on mostly Sinus román but did have two episodes of A fib with MD MARTHA aware.     R-(recommendations): Discharge instructions reviewed with patient. Listed belongings gathered and returned to patient.          Discharge Nursing Criteria:     Care Plan and Patient education resolved: Yes    New Medications- pt has been educated about purpose and side effects: Yes    Vaccines  Influenza status verified at discharge:  Not Applicable      MISC  Prescriptions if needed, hard copies sent with patient  Yes  Home medications returned to patient: NA  Medication Bin checked and emptied on discharge Yes  Patient reports post-discharge pain management plan is effective: Yes    TKA/PERLITA ONLY:   Discharged with TRISTEN Stockings No  TRISTEN stocking Education Completed No

## 2023-10-07 LAB — BACTERIA BLD CULT: NO GROWTH

## 2023-10-08 ENCOUNTER — PATIENT OUTREACH (OUTPATIENT)
Dept: CARE COORDINATION | Facility: CLINIC | Age: 63
End: 2023-10-08
Payer: MEDICAID

## 2023-10-08 NOTE — PROGRESS NOTES
Clinic Care Coordination Contact  [facility]: Post-Discharge Note  SITUATION                                                      Admission:    Admission Date: 10/01/23   Reason for Admission: Diabetic ketoacidosis secondary to malfunctioning insulin pump.     Monetary issues leading to inadequate number of supplies for insulin pump and insulin pump failure     Intermittent atrial fibrillation with RVR,     Short-lived PSVT of approximately 3 seconds     COPD     Recent tobacco use     Hypertension  Discharge:   Discharge Date: 10/06/23  Discharge Diagnosis: Diabetic ketoacidosis secondary to malfunctioning insulin pump.     Monetary issues leading to inadequate number of supplies for insulin pump and insulin pump failure     Intermittent atrial fibrillation with RVR,     Short-lived PSVT of approximately 3 seconds     COPD     Recent tobacco use     Hypertension    BACKGROUND                                                      Per hospital discharge summary and inpatient provider notes:    63-year-old man with reported history of type 2 diabetes treated chronically with insulin pump, COPD with ongoing tobacco use and recent exacerbation September 27 treated with Zithromax and bronchodilators, hypertension, and apparent history of old MI in 2002 presented to outside ER (in [facility] ldue  to cough, weakness, and decreased oral intake.  Upon evaluation in the ER, he was found to be in diabetic ketoacidosis and also had atrial fibrillation with RVR, so hospitalization was advised and he was transferred to this hospital for inpatient admission.     ASSESSMENT           Discharge Assessment  How are you doing now that you are home?: doing well  How are your symptoms? (Red Flag symptoms escalate to triage hotline per guidelines): Improved  Do you feel your condition is stable enough to be safe at home until your provider visit?: Yes  Does the patient have their discharge instructions? : Yes  Does the patient have  questions regarding their discharge instructions? : No  Were you started on any new medications or were there changes to any of your previous medications? : Yes  Does the patient have all of their medications?: Yes  Do you have questions regarding any of your medications? : No  Do you have all of your needed medical supplies or equipment (DME)?  (i.e. oxygen tank, CPAP, cane, etc.): Yes  Discharge follow-up appointment scheduled within 14 calendar days? : No  Is patient agreeable to assistance with scheduling? : No (Planning to call clinic)    Post-op ([facility] Only)  If the patient had a surgery or procedure, do they have any questions for a nurse?: No         PLAN                                                      Outpatient Plan:      No future appointments.    Follow-up Appointments     Follow-up and recommended labs and tests       Follow up with primary care provider, Physician No Ref-Primary, within 7   days to evaluate medication change, for hospital follow- up, regarding new   diagnosis, and follow up for zio patch and /or cardiology referral for   afib with RVR.  The following labs/tests are recommended: holter or   ziopatch for at least 14 days.      For any urgent concerns, please contact our 24 hour nurse triage line: [phone]     GEOFF Gabriel  [phone]  [facility]

## 2023-11-05 ENCOUNTER — HEALTH MAINTENANCE LETTER (OUTPATIENT)
Age: 63
End: 2023-11-05

## 2024-01-14 ENCOUNTER — HEALTH MAINTENANCE LETTER (OUTPATIENT)
Age: 64
End: 2024-01-14

## 2024-03-09 ENCOUNTER — HOSPITAL ENCOUNTER (EMERGENCY)
Facility: CLINIC | Age: 64
Discharge: HOME | End: 2024-03-09
Attending: STUDENT IN AN ORGANIZED HEALTH CARE EDUCATION/TRAINING PROGRAM | Admitting: STUDENT IN AN ORGANIZED HEALTH CARE EDUCATION/TRAINING PROGRAM
Payer: COMMERCIAL

## 2024-03-09 VITALS
TEMPERATURE: 98.9 F | SYSTOLIC BLOOD PRESSURE: 137 MMHG | OXYGEN SATURATION: 93 % | RESPIRATION RATE: 18 BRPM | WEIGHT: 195 LBS | BODY MASS INDEX: 27.2 KG/M2 | HEART RATE: 56 BPM | DIASTOLIC BLOOD PRESSURE: 98 MMHG

## 2024-03-09 DIAGNOSIS — K04.7 DENTAL INFECTION: ICD-10-CM

## 2024-03-09 PROCEDURE — 64400 NJX AA&/STRD TRIGEMINAL NRV: CPT

## 2024-03-09 PROCEDURE — 99284 EMERGENCY DEPT VISIT MOD MDM: CPT | Mod: 25

## 2024-03-09 PROCEDURE — 250N000013 HC RX MED GY IP 250 OP 250 PS 637: Performed by: STUDENT IN AN ORGANIZED HEALTH CARE EDUCATION/TRAINING PROGRAM

## 2024-03-09 PROCEDURE — 99284 EMERGENCY DEPT VISIT MOD MDM: CPT | Performed by: STUDENT IN AN ORGANIZED HEALTH CARE EDUCATION/TRAINING PROGRAM

## 2024-03-09 RX ORDER — OXYCODONE HYDROCHLORIDE 5 MG/1
5 TABLET ORAL EVERY 6 HOURS PRN
Qty: 12 TABLET | Refills: 0 | Status: SHIPPED | OUTPATIENT
Start: 2024-03-09 | End: 2024-03-12

## 2024-03-09 RX ADMIN — AMOXICILLIN AND CLAVULANATE POTASSIUM 1 TABLET: 875; 125 TABLET, COATED ORAL at 10:24

## 2024-03-09 ASSESSMENT — ACTIVITIES OF DAILY LIVING (ADL)
ADLS_ACUITY_SCORE: 37
ADLS_ACUITY_SCORE: 37

## 2024-03-09 ASSESSMENT — COLUMBIA-SUICIDE SEVERITY RATING SCALE - C-SSRS
6. HAVE YOU EVER DONE ANYTHING, STARTED TO DO ANYTHING, OR PREPARED TO DO ANYTHING TO END YOUR LIFE?: NO
2. HAVE YOU ACTUALLY HAD ANY THOUGHTS OF KILLING YOURSELF IN THE PAST MONTH?: NO
1. IN THE PAST MONTH, HAVE YOU WISHED YOU WERE DEAD OR WISHED YOU COULD GO TO SLEEP AND NOT WAKE UP?: NO

## 2024-03-09 NOTE — ED TRIAGE NOTES
He has a history of dental issues and is scheduled to have all of his teeth removed Friday and the molar that is infected will be removed Tuesday.  He started having swelling in his R lower jaw yesterday

## 2024-03-09 NOTE — ED PROVIDER NOTES
History     Chief Complaint   Patient presents with    Dental Problem     HPI  Galdino Nelson is a 64 year old male presents with dental pain and right lower jaw swelling.  Notes that he has poor dentition and has an appointment on Tuesday to have the tooth extracted.  He has a smoking history.  He otherwise has not had any fevers nausea vomiting troubles breathing any signs or concerns of her lower neck swelling or any other concerns.    Allergies:  No Known Allergies    Problem List:    Patient Active Problem List    Diagnosis Date Noted    DKA (diabetic ketoacidosis) (H) 10/02/2023     Priority: Medium    SIRS (systemic inflammatory response syndrome) (H) 10/02/2023     Priority: Medium    Elevated lipase 10/02/2023     Priority: Medium    Hypophosphatemia 10/02/2023     Priority: Medium    Atrial fibrillation with RVR (H) 10/02/2023     Priority: Medium    Hyponatremia 10/02/2023     Priority: Medium    Elevated troponin 10/02/2023     Priority: Medium    Insulin pump status 01/24/2022     Priority: Medium     Formatting of this note might be different from the original.  770 G- 1/21/2022      Diabetes mellitus, type II (H) 09/29/2019     Priority: Medium    Old MI (myocardial infarction) 09/26/2018     Priority: Medium     Formatting of this note might be different from the original.  Pt reports Hx of MI in 2002- No stents.   Stress Test 2009- No ischemia.   On metoprolol, atorvastatin, recommend baby ASA daily.      Hyperlipidemia 09/26/2018     Priority: Medium     Formatting of this note might be different from the original.  On Atorvastatin.      Chronic obstructive pulmonary disease (H) 11/18/2011     Priority: Medium     Formatting of this note might be different from the original.  PFT's done 7/2018- showing moderate COPD with positive bronchodilator response  Smoking Cessation Advised.   No on any daily medications- No frequent exacerbations/hospitalizations.      Migraine 11/18/2011     Priority:  "Medium    CARDIOVASCULAR SCREENING; LDL GOAL LESS THAN 160 10/31/2010     Priority: Medium    Tobacco use disorder 08/10/2006     Priority: Medium     Formatting of this note might be different from the original.  Started on Chantix 8/2018. Working on quitting on 10/6/2018. History of smoking for 45 years, 1 PPD.      Essential hypertension 01/12/2005     Priority: Medium     Formatting of this note might be different from the original.  Updated by system to replace inactive record  Formatting of this note might be different from the original.  Epic          Past Medical History:    Past Medical History:   Diagnosis Date    Chronic obstructive pulmonary disease (H) 11/18/2011       Past Surgical History:    History reviewed. No pertinent surgical history.    Family History:    No family history on file.    Social History:  Marital Status:  Single [1]  Social History     Tobacco Use    Smoking status: Every Day     Packs/day: 1     Types: Cigarettes    Smokeless tobacco: Never   Substance Use Topics    Alcohol use: Not Currently        Medications:    amoxicillin-clavulanate (AUGMENTIN) 875-125 MG tablet  oxyCODONE (ROXICODONE) 5 MG tablet  amiodarone (PACERONE) 200 MG tablet  aspirin 81 MG EC tablet  blood glucose (ACCU-CHEK GUIDE) test strip  Continuous Blood Gluc  (FREESTYLE KATHYA 14 DAY READER) CHARLA  insulin detemir (LEVEMIR VIAL) 100 UNIT/ML vial  insulin lispro (HUMALOG VIAL) 100 UNIT/ML vial  insulin pen needle (29G X 12MM) 29G X 12MM miscellaneous  insulin syringe-needle U-100 (29G X 1/2\" 1 ML) 29G X 1/2\" 1 ML miscellaneous  insulin syringe-needle U-100 (31G X 5/16\" 0.3 ML) 31G X 5/16\" 0.3 ML miscellaneous  insulin syringe-needle U-100 (31G X 5/16\" 1 ML) 31G X 5/16\" 1 ML miscellaneous  ipratropium - albuterol 0.5 mg/2.5 mg/3 mL (DUONEB) 0.5-2.5 (3) MG/3ML neb solution  metoprolol succinate ER (TOPROL XL) 50 MG 24 hr tablet  PARoxetine (PAXIL) 30 MG tablet          Review of Systems   HENT:  Positive " for dental problem.    All other systems reviewed and are negative.      Physical Exam   BP: (!) 137/98  Pulse: 56  Temp: 98.9  F (37.2  C)  Resp: 18  Weight: 88.5 kg (195 lb)  SpO2: 93 %      Physical Exam  Vitals reviewed.   Constitutional:       Appearance: Normal appearance. He is normal weight.   HENT:      Head: Atraumatic.        Comments: Mild swelling to the lower right side of the mandible.  No signs of overlying skin changes.  No signs of abscess.  No signs of Ludewig's angina or lower submandibular or submental swelling.     Mouth/Throat:      Comments: Poor dentition throughout with his right lower canine tenderness and poor gum/gingival care.  No signs of periapical abscesses or fluctuance.  Uvula is midline with no signs of oropharyngeal pathology.  Cardiovascular:      Rate and Rhythm: Normal rate.      Pulses: Normal pulses.   Pulmonary:      Effort: Pulmonary effort is normal.      Breath sounds: Normal breath sounds. No stridor.   Neurological:      Mental Status: He is alert.         ED [facility]    Dental Block    Date/Time: 3/9/2024 11:11 AM    Performed by: Chula Negrete MD  Authorized by: Chula Negrete MD    Risks, benefits and alternatives discussed.    ED EVALUATION:      Assessment Time: 3/9/2024 11:12 AM      I have performed an Emergency Department Evaluation including taking a history and physical examination, this evaluation will be documented in the electronic medical record for this ED encounter.     Indication: Dental abscess and pain control    ASA Class: Class 1- healthy patient    Mallampati: Grade 1- soft palate, uvula, tonsillar pillars, and posterior pharyngeal wall visible    NPO Status: appropriately NPO for procedure    UNIVERSAL PROTOCOL   Site Marked: Yes  Prior Images Obtained and Reviewed:  Yes  Required items: Required blood products, implants, devices and special equipment available    Patient identity  confirmed:  Verbally with patient, arm band and provided demographic data  Patient was reevaluated immediately before administering moderate or deep sedation or anesthesia  Confirmation Checklist:  Patient's identity using two indicators, relevant allergies and procedure was appropriate and matched the consent or emergent situation  Time out: Immediately prior to the procedure a time out was called    Universal Protocol: the Joint Commission Universal Protocol was followed    Preparation: Patient was prepped and draped in usual sterile fashion      INDICATIONS     Indications: dental abscess and dental pain      SEDATION    Patient Sedated: No    Vital signs: Vital signs monitored during sedation      LOCATION     Block type:  Inferior alveolar    Laterality:  Right    PROCEDURE DETAILS     Topical anesthetic:  Benzocaine gel    Syringe type:  Controlled syringe    Needle gauge:  27 G    Anesthetic injected:  Bupivacaine 0.5% WITH epi    Injection procedure:  Anatomic landmarks identified, introduced needle, incremental injection, anatomic landmarks palpated and negative aspiration for blood    POST PROCEDURE DETAILS      Outcome:  Pain relieved      PROCEDURE    Patient Tolerance:  Patient tolerated the procedure well with no immediate complications  Length of time physician/provider present for 1:1 monitoring during sedation: 10                  No results found for this or any previous visit (from the past 24 hour(s)).    Medications   amoxicillin-clavulanate (AUGMENTIN) 875-125 MG per tablet 1 tablet (1 tablet Oral $Given 3/9/24 1024)       Assessments & Plan (with Medical Decision Making)     I have reviewed the nursing notes.    I have reviewed the findings, diagnosis, plan and need for follow up with the patient.      Medical Decision Making  Pleasant 64 male presented dental pain.  On examination is no drainable abscesses no signs of oropharyngeal swelling tonsillar swellings or uvular deviation.  No signs  of Hung angina.  He has dental pain at the right lower 26 tooth.  Patient was provided with a dental injection for an inferior alveolar nerve block for pain control and provided with Augmentin for infection control as he has some mild lower right-sided lower mandibular swelling.  Prescriptions provided for severe pain control with oxycodone if needed and supportive care measures discussed as well as Augmentin and patient is getting his tooth extracted on Tuesday.  Discussed return precautions and discharge patient home    Discharge Medication List as of 3/9/2024 11:09 AM        START taking these medications    Details   amoxicillin-clavulanate (AUGMENTIN) 875-125 MG tablet Take 1 tablet by mouth 2 times daily for 7 days, Disp-14 tablet, R-0, E-Prescribe      oxyCODONE (ROXICODONE) 5 MG tablet Take 1 tablet (5 mg) by mouth every 6 hours as needed, Disp-12 tablet, R-0, E-Prescribe             Final diagnoses:   Dental infection       3/9/2024   [facility] EMERGENCY DEPT       Chula Negrete MD  03/09/24 [id number]

## 2024-03-09 NOTE — DISCHARGE INSTRUCTIONS
Complete the course of antibiotics.  Continue with your dental appointment Tuesday.  Please only use the oxycodone for severe pain.  Otherwise use ibuprofen Tylenol to maintain your pain control.  Return if you start having swelling below your neck as we discussed for concerns about oral/spreading infection.

## 2024-06-02 ENCOUNTER — HEALTH MAINTENANCE LETTER (OUTPATIENT)
Age: 64
End: 2024-06-02

## 2024-07-31 ENCOUNTER — HOSPITAL ENCOUNTER (EMERGENCY)
Facility: CLINIC | Age: 64
Discharge: HOME | End: 2024-07-31
Attending: EMERGENCY MEDICINE | Admitting: EMERGENCY MEDICINE
Payer: COMMERCIAL

## 2024-07-31 ENCOUNTER — APPOINTMENT (OUTPATIENT)
Dept: GENERAL RADIOLOGY | Facility: CLINIC | Age: 64
End: 2024-07-31
Attending: EMERGENCY MEDICINE
Payer: COMMERCIAL

## 2024-07-31 ENCOUNTER — APPOINTMENT (OUTPATIENT)
Dept: CT IMAGING | Facility: CLINIC | Age: 64
End: 2024-07-31
Attending: EMERGENCY MEDICINE
Payer: COMMERCIAL

## 2024-07-31 VITALS
OXYGEN SATURATION: 98 % | DIASTOLIC BLOOD PRESSURE: 85 MMHG | TEMPERATURE: 98.1 F | RESPIRATION RATE: 21 BRPM | HEART RATE: 68 BPM | SYSTOLIC BLOOD PRESSURE: 150 MMHG

## 2024-07-31 DIAGNOSIS — J44.1 COPD WITH ACUTE EXACERBATION (H): ICD-10-CM

## 2024-07-31 LAB
ALBUMIN SERPL BCG-MCNC: 4.1 G/DL (ref 3.5–5.2)
ALP SERPL-CCNC: 94 U/L (ref 40–150)
ALT SERPL W P-5'-P-CCNC: 12 U/L (ref 0–70)
ANION GAP SERPL CALCULATED.3IONS-SCNC: 19 MMOL/L (ref 7–15)
AST SERPL W P-5'-P-CCNC: 15 U/L (ref 0–45)
BASOPHILS # BLD AUTO: 0.1 10E3/UL (ref 0–0.2)
BASOPHILS NFR BLD AUTO: 1 %
BILIRUB SERPL-MCNC: 1 MG/DL
BUN SERPL-MCNC: 17.3 MG/DL (ref 8–23)
CALCIUM SERPL-MCNC: 9.2 MG/DL (ref 8.8–10.4)
CHLORIDE SERPL-SCNC: 94 MMOL/L (ref 98–107)
CREAT SERPL-MCNC: 0.98 MG/DL (ref 0.67–1.17)
D DIMER PPP FEU-MCNC: 0.3 UG/ML FEU (ref 0–0.5)
EGFRCR SERPLBLD CKD-EPI 2021: 86 ML/MIN/1.73M2
EOSINOPHIL # BLD AUTO: 0.1 10E3/UL (ref 0–0.7)
EOSINOPHIL NFR BLD AUTO: 1 %
ERYTHROCYTE [DISTWIDTH] IN BLOOD BY AUTOMATED COUNT: 13.6 % (ref 10–15)
FLUAV RNA SPEC QL NAA+PROBE: NEGATIVE
FLUBV RNA RESP QL NAA+PROBE: NEGATIVE
GLUCOSE SERPL-MCNC: 461 MG/DL (ref 70–99)
HCO3 SERPL-SCNC: 20 MMOL/L (ref 22–29)
HCT VFR BLD AUTO: 44.1 % (ref 40–53)
HGB BLD-MCNC: 15 G/DL (ref 13.3–17.7)
IMM GRANULOCYTES # BLD: 0 10E3/UL
IMM GRANULOCYTES NFR BLD: 0 %
LYMPHOCYTES # BLD AUTO: 1.3 10E3/UL (ref 0.8–5.3)
LYMPHOCYTES NFR BLD AUTO: 15 %
MCH RBC QN AUTO: 29.8 PG (ref 26.5–33)
MCHC RBC AUTO-ENTMCNC: 34 G/DL (ref 31.5–36.5)
MCV RBC AUTO: 88 FL (ref 78–100)
MONOCYTES # BLD AUTO: 0.6 10E3/UL (ref 0–1.3)
MONOCYTES NFR BLD AUTO: 7 %
NEUTROPHILS # BLD AUTO: 6.7 10E3/UL (ref 1.6–8.3)
NEUTROPHILS NFR BLD AUTO: 77 %
NRBC # BLD AUTO: 0 10E3/UL
NRBC BLD AUTO-RTO: 0 /100
PLATELET # BLD AUTO: 146 10E3/UL (ref 150–450)
POTASSIUM SERPL-SCNC: 4.6 MMOL/L (ref 3.4–5.3)
PROT SERPL-MCNC: 6.6 G/DL (ref 6.4–8.3)
RBC # BLD AUTO: 5.04 10E6/UL (ref 4.4–5.9)
RSV RNA SPEC NAA+PROBE: NEGATIVE
SARS-COV-2 RNA RESP QL NAA+PROBE: NEGATIVE
SODIUM SERPL-SCNC: 133 MMOL/L (ref 135–145)
TROPONIN T SERPL HS-MCNC: 9 NG/L
WBC # BLD AUTO: 8.6 10E3/UL (ref 4–11)

## 2024-07-31 PROCEDURE — 250N000011 HC RX IP 250 OP 636: Performed by: EMERGENCY MEDICINE

## 2024-07-31 PROCEDURE — 82040 ASSAY OF SERUM ALBUMIN: CPT | Performed by: EMERGENCY MEDICINE

## 2024-07-31 PROCEDURE — 99284 EMERGENCY DEPT VISIT MOD MDM: CPT | Performed by: EMERGENCY MEDICINE

## 2024-07-31 PROCEDURE — 71046 X-RAY EXAM CHEST 2 VIEWS: CPT

## 2024-07-31 PROCEDURE — 93005 ELECTROCARDIOGRAM TRACING: CPT | Performed by: EMERGENCY MEDICINE

## 2024-07-31 PROCEDURE — 85025 COMPLETE CBC W/AUTO DIFF WBC: CPT | Performed by: EMERGENCY MEDICINE

## 2024-07-31 PROCEDURE — 84484 ASSAY OF TROPONIN QUANT: CPT | Performed by: EMERGENCY MEDICINE

## 2024-07-31 PROCEDURE — 96374 THER/PROPH/DIAG INJ IV PUSH: CPT | Mod: 59 | Performed by: EMERGENCY MEDICINE

## 2024-07-31 PROCEDURE — 93010 ELECTROCARDIOGRAM REPORT: CPT | Performed by: EMERGENCY MEDICINE

## 2024-07-31 PROCEDURE — 250N000009 HC RX 250: Performed by: EMERGENCY MEDICINE

## 2024-07-31 PROCEDURE — 96375 TX/PRO/DX INJ NEW DRUG ADDON: CPT | Performed by: EMERGENCY MEDICINE

## 2024-07-31 PROCEDURE — 87637 SARSCOV2&INF A&B&RSV AMP PRB: CPT | Performed by: EMERGENCY MEDICINE

## 2024-07-31 PROCEDURE — 36415 COLL VENOUS BLD VENIPUNCTURE: CPT | Performed by: EMERGENCY MEDICINE

## 2024-07-31 PROCEDURE — 71260 CT THORAX DX C+: CPT

## 2024-07-31 PROCEDURE — 99285 EMERGENCY DEPT VISIT HI MDM: CPT | Mod: 25 | Performed by: EMERGENCY MEDICINE

## 2024-07-31 PROCEDURE — 85379 FIBRIN DEGRADATION QUANT: CPT | Performed by: EMERGENCY MEDICINE

## 2024-07-31 RX ORDER — ALBUTEROL SULFATE 90 UG/1
2 AEROSOL, METERED RESPIRATORY (INHALATION) EVERY 6 HOURS PRN
Qty: 18 G | Refills: 0 | Status: SHIPPED | OUTPATIENT
Start: 2024-07-31

## 2024-07-31 RX ORDER — PROCHLORPERAZINE 25 MG/1
SUPPOSITORY RECTAL
COMMUNITY
Start: 2024-06-26

## 2024-07-31 RX ORDER — METHYLPREDNISOLONE SODIUM SUCCINATE 125 MG/2ML
125 INJECTION, POWDER, LYOPHILIZED, FOR SOLUTION INTRAMUSCULAR; INTRAVENOUS ONCE
Status: COMPLETED | OUTPATIENT
Start: 2024-07-31 | End: 2024-07-31

## 2024-07-31 RX ORDER — INSULIN PMP CART,AUT,G6/7,CNTR
EACH SUBCUTANEOUS
COMMUNITY
Start: 2024-02-02

## 2024-07-31 RX ORDER — METOPROLOL SUCCINATE 25 MG/1
1 TABLET, EXTENDED RELEASE ORAL
COMMUNITY
Start: 2024-04-08 | End: 2024-09-09

## 2024-07-31 RX ORDER — PROCHLORPERAZINE 25 MG/1
SUPPOSITORY RECTAL
COMMUNITY
Start: 2024-07-14

## 2024-07-31 RX ORDER — HYDROMORPHONE HYDROCHLORIDE 1 MG/ML
0.5 INJECTION, SOLUTION INTRAMUSCULAR; INTRAVENOUS; SUBCUTANEOUS
Status: COMPLETED | OUTPATIENT
Start: 2024-07-31 | End: 2024-07-31

## 2024-07-31 RX ORDER — INSULIN ASPART 100 [IU]/ML
120 INJECTION, SOLUTION INTRAVENOUS; SUBCUTANEOUS
COMMUNITY

## 2024-07-31 RX ORDER — NITROGLYCERIN 0.4 MG/1
0.4 TABLET SUBLINGUAL EVERY 5 MIN PRN
COMMUNITY
Start: 2024-01-03

## 2024-07-31 RX ORDER — IOPAMIDOL 755 MG/ML
500 INJECTION, SOLUTION INTRAVASCULAR ONCE
Status: COMPLETED | OUTPATIENT
Start: 2024-07-31 | End: 2024-07-31

## 2024-07-31 RX ORDER — INSULIN PMP CART,AUT,G6/7,CNTR
1 EACH SUBCUTANEOUS
COMMUNITY
Start: 2024-07-14

## 2024-07-31 RX ORDER — AZITHROMYCIN 250 MG/1
TABLET, FILM COATED ORAL
Qty: 6 TABLET | Refills: 0 | Status: SHIPPED | OUTPATIENT
Start: 2024-07-31 | End: 2024-08-05

## 2024-07-31 RX ORDER — PREDNISONE 20 MG/1
TABLET ORAL
Qty: 10 TABLET | Refills: 0 | Status: SHIPPED | OUTPATIENT
Start: 2024-07-31 | End: 2024-09-09

## 2024-07-31 RX ADMIN — SODIUM CHLORIDE 61 ML: 9 INJECTION, SOLUTION INTRAVENOUS at 13:25

## 2024-07-31 RX ADMIN — HYDROMORPHONE HYDROCHLORIDE 0.5 MG: 1 INJECTION, SOLUTION INTRAMUSCULAR; INTRAVENOUS; SUBCUTANEOUS at 13:17

## 2024-07-31 RX ADMIN — METHYLPREDNISOLONE SODIUM SUCCINATE 125 MG: 125 INJECTION, POWDER, FOR SOLUTION INTRAMUSCULAR; INTRAVENOUS at 13:10

## 2024-07-31 RX ADMIN — IOPAMIDOL 80 ML: 755 INJECTION, SOLUTION INTRAVENOUS at 13:25

## 2024-07-31 ASSESSMENT — ACTIVITIES OF DAILY LIVING (ADL)
ADLS_ACUITY_SCORE: 39
ADLS_ACUITY_SCORE: 37
ADLS_ACUITY_SCORE: 37
ADLS_ACUITY_SCORE: 35

## 2024-07-31 ASSESSMENT — COLUMBIA-SUICIDE SEVERITY RATING SCALE - C-SSRS
6. HAVE YOU EVER DONE ANYTHING, STARTED TO DO ANYTHING, OR PREPARED TO DO ANYTHING TO END YOUR LIFE?: NO
1. IN THE PAST MONTH, HAVE YOU WISHED YOU WERE DEAD OR WISHED YOU COULD GO TO SLEEP AND NOT WAKE UP?: NO
2. HAVE YOU ACTUALLY HAD ANY THOUGHTS OF KILLING YOURSELF IN THE PAST MONTH?: NO

## 2024-07-31 NOTE — ED NOTES
Pt able to ambulate out of ER at this time and not in distress at time of exit. Pt has ride from family member and appeared to understand discharge paperwork for care

## 2024-07-31 NOTE — ED NOTES
Took over care of pt at this time, medication given per order, no waste, CT called for scan.  Assessment complete at time of pt care with this RN

## 2024-07-31 NOTE — ED PROVIDER NOTES
"  History     Chief Complaint   Patient presents with    Shortness of Breath     HPI  Galdino Nelson is a 64 year old male who presents with concern of shortness of breath.  He said yesterday he started feeling ill, but today symptoms were significantly worse.  He says he feels like he was \"hit by a truck\".  Has difficulty taking deep breath.  Feels similar to when he previously had pneumonia.  Also has history of COPD, but is not managed on any maintenance medication nor does he have any rescue medication.  Still smokes 1 pack/day.  No recent sick contacts.  Has not been vomiting.  He denies chest pain but says he feels pain when he takes deep inspiration    Allergies:  No Known Allergies    Problem List:    Patient Active Problem List    Diagnosis Date Noted    DKA (diabetic ketoacidosis) (H) 10/02/2023     Priority: Medium    SIRS (systemic inflammatory response syndrome) (H) 10/02/2023     Priority: Medium    Elevated lipase 10/02/2023     Priority: Medium    Hypophosphatemia 10/02/2023     Priority: Medium    Atrial fibrillation with RVR (H) 10/02/2023     Priority: Medium    Hyponatremia 10/02/2023     Priority: Medium    Elevated troponin 10/02/2023     Priority: Medium    Insulin pump status 01/24/2022     Priority: Medium     Formatting of this note might be different from the original.  770 G- 1/21/2022      Diabetes mellitus, type II (H) 09/29/2019     Priority: Medium    Old MI (myocardial infarction) 09/26/2018     Priority: Medium     Formatting of this note might be different from the original.  Pt reports Hx of MI in 2002- No stents.   Stress Test 2009- No ischemia.   On metoprolol, atorvastatin, recommend baby ASA daily.      Hyperlipidemia 09/26/2018     Priority: Medium     Formatting of this note might be different from the original.  On Atorvastatin.      Chronic obstructive pulmonary disease (H) 11/18/2011     Priority: Medium     Formatting of this note might be different from the " original.  PFT's done 7/2018- showing moderate COPD with positive bronchodilator response  Smoking Cessation Advised.   No on any daily medications- No frequent exacerbations/hospitalizations.      Migraine 11/18/2011     Priority: Medium    CARDIOVASCULAR SCREENING; LDL GOAL LESS THAN 160 10/31/2010     Priority: Medium    Tobacco use disorder 08/10/2006     Priority: Medium     Formatting of this note might be different from the original.  Started on Chantix 8/2018. Working on quitting on 10/6/2018. History of smoking for 45 years, 1 PPD.      Essential hypertension 01/12/2005     Priority: Medium     Formatting of this note might be different from the original.  Updated by system to replace inactive record  Formatting of this note might be different from the original.  Epic          Past Medical History:    Past Medical History:   Diagnosis Date    Chronic obstructive pulmonary disease (H) 11/18/2011       Past Surgical History:    No past surgical history on file.    Family History:    No family history on file.    Social History:  Marital Status:  Single [1]  Social History     Tobacco Use    Smoking status: Every Day     Current packs/day: 1.00     Types: Cigarettes    Smokeless tobacco: Never   Substance Use Topics    Alcohol use: Not Currently        Medications:    albuterol (PROAIR HFA/PROVENTIL HFA/VENTOLIN HFA) 108 (90 Base) MCG/ACT inhaler  azithromycin (ZITHROMAX) 250 MG tablet  blood glucose (ACCU-CHEK GUIDE) test strip  Continuous Glucose Sensor (DEXCOM G6 SENSOR) MISC  Continuous Glucose Transmitter (DEXCOM G6 TRANSMITTER) MISC  Insulin Disposable Pump (OMNIPOD 5 INTRO, GEN 5,) KIT  Insulin Disposable Pump (OMNIPOD 5 PODS, GEN 5,) MISC  metoprolol succinate ER (TOPROL XL) 25 MG 24 hr tablet  nitroGLYcerin (NITROSTAT) 0.4 MG sublingual tablet  NOVOLOG RELION 100 UNIT/ML vial  PARoxetine (PAXIL) 30 MG tablet  predniSONE (DELTASONE) 20 MG tablet          Review of Systems   All other systems reviewed  and are negative.      Physical Exam   BP: (!) 143/93  Pulse: 66  Temp: 97.9  F (36.6  C)  Resp: 22  SpO2: 99 %      Physical Exam  Vitals and nursing note reviewed.   Constitutional:       Appearance: He is ill-appearing. He is not diaphoretic.   Cardiovascular:      Rate and Rhythm: Normal rate and regular rhythm.   Pulmonary:      Effort: Pulmonary effort is normal.      Breath sounds: Decreased breath sounds present. No wheezing.   Musculoskeletal:      Right lower leg: No edema.      Left lower leg: No edema.   Skin:     General: Skin is warm and dry.   Neurological:      Mental Status: He is alert.         ED Course        Procedures              EKG Interpretation:      Interpreted by Juana Cruz DO  Time reviewed: 1105  Symptoms at time of EKG: Chest pain, shortness of breath  Rhythm: normal sinus   Rate: Normal  Axis: Left Axis Deviation  Ectopy: none  Conduction: right bundle branch block (incomplete)  ST Segments/ T Waves: No ST-T wave changes  Q Waves: none  Comparison to prior: Previously sinus bradycardia with left axis and sinus rhythm    Clinical Impression: non-specific EKG            Critical Care time:  none               Results for orders placed or performed during the hospital encounter of 07/31/24 (from the past 24 hour(s))   XR Chest 2 Views    Narrative    CHEST TWO VIEWS 7/31/2024 11:16 AM     HISTORY: Right-sided chest pain with cough and painful inhale.    COMPARISON: September 27, 2023       Impression    IMPRESSION: There are no acute infiltrates. The cardiac silhouette is  not enlarged. Pulmonary vasculature is unremarkable.    ANA LAURA SCOTT MD         SYSTEM ID:  [id number]   CBC with platelets differential    Narrative    The following orders were created for panel order CBC with platelets differential.  Procedure                               Abnormality         Status                     ---------                               -----------         ------                      CBC with platelets and d...[[id number]]  Abnormal            Final result                 Please view results for these tests on the individual orders.   D dimer quantitative   Result Value Ref Range    D-Dimer Quantitative 0.30 0.00 - 0.50 ug/mL FEU    Narrative    This D-dimer assay is intended for use in conjunction with a clinical pretest probability assessment model to exclude pulmonary embolism (PE) and deep venous thrombosis (DVT) in outpatients suspected of PE or DVT. The cut-off value is 0.50 ug/mL FEU.    For patients 50 years of age or older, the application of age-adjusted cut-off values for D-Dimer may increase the specificity without significant effect on sensitivity. The literature suggested calculation age adjusted cut-off in ug/L = age in years x 10 ug/L. The results in this laboratory are reported as ug/mL rather than ug/L. The calculation for age adjusted cut off in ug/mL= age in years x 0.01 ug/mL. For example, the cut off for a 76 year old male is 76 x 0.01 ug/mL = 0.76 ug/mL (760 ug/L).    M Tal et al. Age adjusted D-dimer cut-off levels to rule out pulmonary embolism: The ADJUST-PE Study. PATSY 2014;311:8875-8727.; HJ Travis et al. Diagnostic accuracy of conventional or age adjusted D-dimer cutoff values in older patients with suspected venous thromboembolism. Systemic review and meta-analysis. BMJ 2013:346:f2492.   Comprehensive metabolic panel   Result Value Ref Range    Sodium 133 (L) 135 - 145 mmol/L    Potassium 4.6 3.4 - 5.3 mmol/L    Carbon Dioxide (CO2) 20 (L) 22 - 29 mmol/L    Anion Gap 19 (H) 7 - 15 mmol/L    Urea Nitrogen 17.3 8.0 - 23.0 mg/dL    Creatinine 0.98 0.67 - 1.17 mg/dL    GFR Estimate 86 >60 mL/min/1.73m2    Calcium 9.2 8.8 - 10.4 mg/dL    Chloride 94 (L) 98 - 107 mmol/L    Glucose 461 (H) 70 - 99 mg/dL    Alkaline Phosphatase 94 40 - 150 U/L    AST 15 0 - 45 U/L    ALT 12 0 - 70 U/L    Protein Total 6.6 6.4 - 8.3 g/dL    Albumin 4.1 3.5 - 5.2 g/dL    Bilirubin  Total 1.0 <=1.2 mg/dL   Troponin T, High Sensitivity   Result Value Ref Range    Troponin T, High Sensitivity 9 <=22 ng/L   CBC with platelets and differential   Result Value Ref Range    WBC Count 8.6 4.0 - 11.0 10e3/uL    RBC Count 5.04 4.40 - 5.90 10e6/uL    Hemoglobin 15.0 13.3 - 17.7 g/dL    Hematocrit 44.1 40.0 - 53.0 %    MCV 88 78 - 100 fL    MCH 29.8 26.5 - 33.0 pg    MCHC 34.0 31.5 - 36.5 g/dL    RDW 13.6 10.0 - 15.0 %    Platelet Count 146 (L) 150 - 450 10e3/uL    % Neutrophils 77 %    % Lymphocytes 15 %    % Monocytes 7 %    % Eosinophils 1 %    % Basophils 1 %    % Immature Granulocytes 0 %    NRBCs per 100 WBC 0 <1 /100    Absolute Neutrophils 6.7 1.6 - 8.3 10e3/uL    Absolute Lymphocytes 1.3 0.8 - 5.3 10e3/uL    Absolute Monocytes 0.6 0.0 - 1.3 10e3/uL    Absolute Eosinophils 0.1 0.0 - 0.7 10e3/uL    Absolute Basophils 0.1 0.0 - 0.2 10e3/uL    Absolute Immature Granulocytes 0.0 <=0.4 10e3/uL    Absolute NRBCs 0.0 10e3/uL   Symptomatic Influenza A/B, RSV, & SARS-CoV2 PCR (COVID-19) Nasopharyngeal    Specimen: Nasopharyngeal; Swab   Result Value Ref Range    Influenza A PCR Negative Negative    Influenza B PCR Negative Negative    RSV PCR Negative Negative    SARS CoV2 PCR Negative Negative    Narrative    Testing was performed using the Xpert Xpress CoV2/Flu/RSV Assay on the [employer] GeneXpert Instrument. This test should be ordered for the detection of SARS-CoV-2, influenza, and RSV viruses in individuals who meet clinical and/or epidemiological criteria. Test performance is unknown in asymptomatic patients. This test is for in vitro diagnostic use under the FDA EUA for laboratories certified under CLIA to perform high or moderate complexity testing. This test has not been FDA cleared or approved. A negative result does not rule out the presence of PCR inhibitors in the specimen or target RNA in concentration below the limit of detection for the assay. If only one viral target is positive but  coinfection with multiple targets is suspected, the sample should be re-tested with another FDA cleared, approved, or authorized test, if coinfection would change clinical management. This test was validated by the [facility]. These laboratories are certified under the Clinical Laboratory Improvement Amendments of 1988 (CLIA-88) as qualified to perform high complexity laboratory testing.   CT CHEST W CONTRAST    Narrative    CT CHEST WITH CONTRAST  7/31/2024 1:37 PM     HISTORY: Right-sided chest pain, negative x-ray.    COMPARISON: June 14, 2022    TECHNIQUE: Volumetric helical acquisition of CT images of the chest  from the clavicles to the kidneys were acquired after the  administration of 80 mL Isovue-370 IV contrast. Radiation dose for  this scan was reduced using automated exposure control, adjustment of  the mA and/or kV according to patient size, or iterative  reconstruction technique.    FINDINGS:   LUNGS AND PLEURA: No pneumothorax or effusion. No infiltrates.    MEDIASTINUM/AXILLAE: No adenopathy or aneurysm.    CORONARY ARTERY CALCIFICATIONS: Mild.    UPPER ABDOMEN: No acute findings.    MUSCULOSKELETAL: No frankly destructive bony lesions. No definite  fractures demonstrated.      Impression    IMPRESSION:  No acute process demonstrated.    ANA LAURA SCOTT MD         SYSTEM ID:  [id number]       Medications   methylPREDNISolone sodium succinate (solu-MEDROL) injection 125 mg (125 mg Intravenous $Given 7/31/24 1310)   HYDROmorphone (PF) (DILAUDID) injection 0.5 mg (0.5 mg Intravenous $Given 7/31/24 1317)   iopamidol (ISOVUE-370) solution 500 mL (80 mLs Intravenous $Given 7/31/24 1325)   sodium chloride 0.9 % bag 100mL for CT scan flush use (61 mLs Intravenous $Given 7/31/24 1325)       Assessments & Plan (with Medical Decision Making)  Galdino is a 64-year-old male presenting with concern of shortness of breath.  See history and physical exam as above  Fatigued appearing 64-year-old male in  no acute respiratory distress, is oxygenating at 98% on room air.  Is vitally stable and afebrile.  Lungs are clear to auscultation bilaterally, but he had just cleared his throat and coughed before exam, so no appreciable wheezing.  EKG was obtained in triage, shows sinus rhythm with incomplete right bundle branch block and leftward axis, which is a change from his previous EKGs.  Will plan to get a troponin level, as well as other blood tests to further evaluate his underlying symptoms.  He had already gone for chest x-ray, which was reviewed and does not reveal acute process.  Will wait for lab work before determining appropriate CT imaging for his chest.  Since he is not wheezing, will not give a dose of DuoNeb at this time, but will give some Solu-Medrol given his underlying COPD  Labs and imaging results as above.  Troponin was within limits of normal.  His other lab work did not show any acute worrisome findings.  D-dimer was within levels of normal, no concern for pulmonary embolism.  However, given patient's reported symptoms, his appearance on arrival in the ED, and underlying COPD, will send for CT scan of his chest to evaluate for suspected underlying pneumonia.  He also continues to complain of chest pain on his right side and in his right lung, and was given a dose of Dilaudid to help with pain, since he does have a safe ride home.  There would also be concern for possible thoracic aneurysm with dissection, which will be better evaluated by CT  CT results as above.  No evidence of acute pneumonia, pneumothorax, or pleural effusion.  Patient remains saturating 98% on room air.  Will plan to treat for COPD exacerbation with steroids, azithromycin, and albuterol.  These prescriptions were sent to his pharmacy.  He was advised to follow-up with his primary provider in clinic within 1 to 2 weeks, and return to the ED if any new or worsening symptoms develop.     I have reviewed the nursing notes.    I have  reviewed the findings, diagnosis, plan and need for follow up with the patient.           Medical Decision Making  The patient's presentation was of moderate complexity (a chronic illness mild to moderate exacerbation, progression, or side effect of treatment).    The patient's evaluation involved:  ordering and/or review of 3+ test(s) in this encounter (see separate area of note for details)    The patient's management necessitated moderate risk (prescription drug management including medications given in the ED).        New Prescriptions    ALBUTEROL (PROAIR HFA/PROVENTIL HFA/VENTOLIN HFA) 108 (90 BASE) MCG/ACT INHALER    Inhale 2 puffs into the lungs every 6 hours as needed for shortness of breath, wheezing or cough    AZITHROMYCIN (ZITHROMAX) 250 MG TABLET    Take 2 tablets (500 mg) by mouth daily for 1 day, THEN 1 tablet (250 mg) daily for 4 days.    PREDNISONE (DELTASONE) 20 MG TABLET    Take two tablets (= 40mg) each day for 5 (five) days       Final diagnoses:   COPD with acute exacerbation (H)       7/31/2024   [facility] EMERGENCY DEPT       Juana Cruz DO  07/31/24 [id number]

## 2024-07-31 NOTE — MEDICATION SCRIBE - ADMISSION MEDICATION HISTORY
Medication Scribe Admission Medication History    Admission medication history is complete. The information provided in this note is only as accurate as the sources available at the time of the update.    Information Source(s): Patient and CareEverywhere/SureScripts via in-person    Pertinent Information: omnipod off for CT    Changes made to PTA medication list:  Added: Relion novolog, omnipod system, nitroglycerin  Deleted: amniodarone, aspirin, levemir, all insulin syringes (omnipod comes with required needles), duoneb  Changed: metoprolol from 50mg to 25mg, continuous reader from freestyle to Dexcom system.    Allergies reviewed with patient and updates made in EHR: yes    Medication History Completed By: ANDREA BARAJAS 7/31/2024 1:53 PM    PTA Med List   Medication Sig Note Last Dose    blood glucose (ACCU-CHEK GUIDE) test strip 1 strip by In Vitro route 3 times daily  7/31/2024 at am #455    Continuous Glucose Sensor (DEXCOM G6 SENSOR) MISC Change every 10 days   at Rt arm    Continuous Glucose Transmitter (DEXCOM G6 TRANSMITTER) MISC Use with sensor   at current    Insulin Disposable Pump (OMNIPOD 5 INTRO, GEN 5,) KIT    at current    Insulin Disposable Pump (OMNIPOD 5 PODS, GEN 5,) MISC 1 each every 72 hours   at off for CT    metoprolol succinate ER (TOPROL XL) 25 MG 24 hr tablet Take 1 tablet by mouth daily at 2 pm  7/31/2024 at am    nitroGLYcerin (NITROSTAT) 0.4 MG sublingual tablet Place 0.4 mg under the tongue every 5 minutes as needed for chest pain 7/31/2024: Never used on hand at never used    NOVOLOG RELION 100 UNIT/ML vial Inject 120 Units subcutaneously every 3 days In omnipod disposable pump   at pod off now    PARoxetine (PAXIL) 30 MG tablet Take 30 mg by mouth daily  7/31/2024 at am

## 2024-07-31 NOTE — DISCHARGE INSTRUCTIONS
Blood work, x-ray, CT scan, and EKG did not show any acute worrisome findings.  I do not see any evidence of pneumonia, viral swab was negative for COVID, influenza, and RSV.  You do not have any evidence of acute strain or stress on your heart    Your symptoms are likely due to COPD exacerbation.  He will be treated with steroid, antibiotic, and inhaler.  Hopefully this will help improve your symptoms    You were given a dose of steroids in the emergency room today.  You can start the steroid prescription tomorrow.  Take first thing in the morning so that it does not interfere with your sleep if taken later in the day    You may start the antibiotics today.  Complete the course even if you begin to feel better    Use the inhaler, 2 puffs every 4-6 hours as needed for acute cough, shortness of breath, or wheezing    If any new or worsening symptoms develop, do not hesitate to return to the emergency room for evaluation

## 2024-07-31 NOTE — ED TRIAGE NOTES
"Patient presents with concern of SOA and chest pain. States he felt unwell last night but did not think much of it. Woke up this morning feeling like he was \"hit by a truck\". States he thinks he has pneumonia. Painful to do deep inhales, mostly on the R side. Hx of COPD, has not used an inhaler PTA.     Triage Assessment (Adult)       Row Name 07/31/24 1054          Triage Assessment    Airway WDL WDL        Respiratory WDL    Respiratory WDL X        Skin Circulation/Temperature WDL    Skin Circulation/Temperature WDL WDL        Cardiac WDL    Cardiac WDL X        Peripheral/Neurovascular WDL    Peripheral Neurovascular WDL WDL        Cognitive/Neuro/Behavioral WDL    Cognitive/Neuro/Behavioral WDL WDL                     "

## 2024-09-09 ENCOUNTER — OFFICE VISIT (OUTPATIENT)
Dept: FAMILY MEDICINE | Facility: OTHER | Age: 64
End: 2024-09-09
Payer: COMMERCIAL

## 2024-09-09 VITALS
TEMPERATURE: 97.8 F | OXYGEN SATURATION: 100 % | HEIGHT: 70 IN | SYSTOLIC BLOOD PRESSURE: 145 MMHG | DIASTOLIC BLOOD PRESSURE: 89 MMHG | BODY MASS INDEX: 26.77 KG/M2 | RESPIRATION RATE: 18 BRPM | HEART RATE: 60 BPM | WEIGHT: 187 LBS

## 2024-09-09 DIAGNOSIS — J44.9 CHRONIC OBSTRUCTIVE PULMONARY DISEASE, UNSPECIFIED COPD TYPE (H): ICD-10-CM

## 2024-09-09 DIAGNOSIS — Z12.11 SCREEN FOR COLON CANCER: ICD-10-CM

## 2024-09-09 DIAGNOSIS — I25.2 OLD MI (MYOCARDIAL INFARCTION): ICD-10-CM

## 2024-09-09 DIAGNOSIS — Z11.4 SCREENING FOR HIV (HUMAN IMMUNODEFICIENCY VIRUS): ICD-10-CM

## 2024-09-09 DIAGNOSIS — I10 ESSENTIAL HYPERTENSION: ICD-10-CM

## 2024-09-09 DIAGNOSIS — Z96.41 INSULIN PUMP STATUS: ICD-10-CM

## 2024-09-09 DIAGNOSIS — Z11.59 NEED FOR HEPATITIS C SCREENING TEST: ICD-10-CM

## 2024-09-09 DIAGNOSIS — F41.9 ANXIETY: ICD-10-CM

## 2024-09-09 DIAGNOSIS — Z87.891 PERSONAL HISTORY OF TOBACCO USE: ICD-10-CM

## 2024-09-09 DIAGNOSIS — E10.65 UNCONTROLLED TYPE 1 DIABETES MELLITUS WITH HYPERGLYCEMIA (H): ICD-10-CM

## 2024-09-09 DIAGNOSIS — Z71.6 ENCOUNTER FOR TOBACCO USE CESSATION COUNSELING: ICD-10-CM

## 2024-09-09 DIAGNOSIS — I48.0 PAROXYSMAL A-FIB (H): ICD-10-CM

## 2024-09-09 DIAGNOSIS — N63.25 MASS OVERLAPPING MULTIPLE QUADRANTS OF LEFT BREAST: ICD-10-CM

## 2024-09-09 DIAGNOSIS — Z76.89 ENCOUNTER TO ESTABLISH CARE: Primary | ICD-10-CM

## 2024-09-09 PROBLEM — E11.10 DKA (DIABETIC KETOACIDOSIS) (H): Status: RESOLVED | Noted: 2023-10-02 | Resolved: 2024-09-09

## 2024-09-09 PROBLEM — R65.10 SIRS (SYSTEMIC INFLAMMATORY RESPONSE SYNDROME) (H): Status: RESOLVED | Noted: 2023-10-02 | Resolved: 2024-09-09

## 2024-09-09 PROBLEM — R79.89 ELEVATED TROPONIN: Status: RESOLVED | Noted: 2023-10-02 | Resolved: 2024-09-09

## 2024-09-09 PROBLEM — E83.39 HYPOPHOSPHATEMIA: Status: RESOLVED | Noted: 2023-10-02 | Resolved: 2024-09-09

## 2024-09-09 PROBLEM — E87.1 HYPONATREMIA: Status: RESOLVED | Noted: 2023-10-02 | Resolved: 2024-09-09

## 2024-09-09 PROBLEM — E11.9 DIABETES MELLITUS, TYPE II (H): Status: RESOLVED | Noted: 2019-09-29 | Resolved: 2024-09-09

## 2024-09-09 LAB
ANION GAP SERPL CALCULATED.3IONS-SCNC: 11 MMOL/L (ref 7–15)
BUN SERPL-MCNC: 12.6 MG/DL (ref 8–23)
CALCIUM SERPL-MCNC: 9.2 MG/DL (ref 8.8–10.4)
CHLORIDE SERPL-SCNC: 101 MMOL/L (ref 98–107)
CHOLEST SERPL-MCNC: 174 MG/DL
CREAT SERPL-MCNC: 0.99 MG/DL (ref 0.67–1.17)
CREAT UR-MCNC: 95.6 MG/DL
EGFRCR SERPLBLD CKD-EPI 2021: 85 ML/MIN/1.73M2
FASTING STATUS PATIENT QL REPORTED: YES
FASTING STATUS PATIENT QL REPORTED: YES
GLUCOSE SERPL-MCNC: 273 MG/DL (ref 70–99)
HBA1C MFR BLD: 8.9 % (ref 0–5.6)
HCO3 SERPL-SCNC: 27 MMOL/L (ref 22–29)
HCV AB SERPL QL IA: NONREACTIVE
HDLC SERPL-MCNC: 57 MG/DL
HIV 1+2 AB+HIV1 P24 AG SERPL QL IA: NONREACTIVE
LDLC SERPL CALC-MCNC: 101 MG/DL
MICROALBUMIN UR-MCNC: <12 MG/L
MICROALBUMIN/CREAT UR: NORMAL MG/G{CREAT}
NONHDLC SERPL-MCNC: 117 MG/DL
POTASSIUM SERPL-SCNC: 3.8 MMOL/L (ref 3.4–5.3)
SODIUM SERPL-SCNC: 139 MMOL/L (ref 135–145)
TRIGL SERPL-MCNC: 80 MG/DL

## 2024-09-09 PROCEDURE — 82043 UR ALBUMIN QUANTITATIVE: CPT | Performed by: STUDENT IN AN ORGANIZED HEALTH CARE EDUCATION/TRAINING PROGRAM

## 2024-09-09 PROCEDURE — 83036 HEMOGLOBIN GLYCOSYLATED A1C: CPT | Performed by: STUDENT IN AN ORGANIZED HEALTH CARE EDUCATION/TRAINING PROGRAM

## 2024-09-09 PROCEDURE — 99204 OFFICE O/P NEW MOD 45 MIN: CPT | Performed by: STUDENT IN AN ORGANIZED HEALTH CARE EDUCATION/TRAINING PROGRAM

## 2024-09-09 PROCEDURE — 36415 COLL VENOUS BLD VENIPUNCTURE: CPT | Performed by: STUDENT IN AN ORGANIZED HEALTH CARE EDUCATION/TRAINING PROGRAM

## 2024-09-09 PROCEDURE — 80048 BASIC METABOLIC PNL TOTAL CA: CPT | Performed by: STUDENT IN AN ORGANIZED HEALTH CARE EDUCATION/TRAINING PROGRAM

## 2024-09-09 PROCEDURE — 86803 HEPATITIS C AB TEST: CPT | Performed by: STUDENT IN AN ORGANIZED HEALTH CARE EDUCATION/TRAINING PROGRAM

## 2024-09-09 PROCEDURE — 87389 HIV-1 AG W/HIV-1&-2 AB AG IA: CPT | Performed by: STUDENT IN AN ORGANIZED HEALTH CARE EDUCATION/TRAINING PROGRAM

## 2024-09-09 PROCEDURE — 80061 LIPID PANEL: CPT | Performed by: STUDENT IN AN ORGANIZED HEALTH CARE EDUCATION/TRAINING PROGRAM

## 2024-09-09 PROCEDURE — 82570 ASSAY OF URINE CREATININE: CPT | Performed by: STUDENT IN AN ORGANIZED HEALTH CARE EDUCATION/TRAINING PROGRAM

## 2024-09-09 RX ORDER — METOPROLOL SUCCINATE 25 MG/1
25 TABLET, EXTENDED RELEASE ORAL
Qty: 30 TABLET | Refills: 0 | Status: SHIPPED | OUTPATIENT
Start: 2024-09-09

## 2024-09-09 RX ORDER — PAROXETINE 30 MG/1
30 TABLET, FILM COATED ORAL DAILY
Qty: 90 TABLET | Refills: 0 | Status: SHIPPED | OUTPATIENT
Start: 2024-09-09

## 2024-09-09 ASSESSMENT — PAIN SCALES - GENERAL: PAINLEVEL: NO PAIN (0)

## 2024-09-09 NOTE — PROGRESS NOTES
Assessment & Plan     Encounter to establish care  Screening for HIV (human immunodeficiency virus)  - HIV Antigen Antibody Combo; Future  - HIV Antigen Antibody Combo  Screen for colon cancer  - Colonoscopy Screening  Referral; Future  Need for hepatitis C screening test  - Hepatitis C Screen Reflex to HCV RNA Quant and Genotype; Future  - Hepatitis C Screen Reflex to HCV RNA Quant and Genotype  Establish care for the patient today from [facility], screenings as above, also overdue for colonoscopy.    Uncontrolled type 1 diabetes mellitus with hyperglycemia (H)  - Lipid panel reflex to direct LDL Non-fasting  - Adult Endocrinology  Referral; Future  - Adult Diabetes Education  Referral; Future  - Hemoglobin A1c  - Basic metabolic panel  (Ca, Cl, CO2, Creat, Gluc, K, Na, BUN)  - Albumin Random Urine Quantitative with Creat Ratio  Insulin pump status  Previously followed up with endocrinology with [facility], referral placed, currently utilizing insulin pump for his diabetes control, recent A1c 8.8 - 3 months ago, due for recheck today.  Eye exam earlier this year in February.  Diabetes education also encouraged    Chronic obstructive pulmonary disease, unspecified COPD type (H)  Personal history of tobacco use  Encounter for tobacco use cessation counseling  Declines any options to cut down or quit tobacco use, utilizes albuterol as needed typically only once every month or every other month.     Paroxysmal A-fib (H)  Old MI (myocardial infarction)?  Essential hypertension  - metoprolol succinate ER (TOPROL XL) 25 MG 24 hr tablet; Take 1 tablet (25 mg) by mouth daily at 2 pm.  Follows up with cardiology, blood pressure slightly elevated and we will plan for recheck in about 1 month, likely increase his metoprolol.  There is some question if he had a heart attack approximately 22 years ago, even cardiology questions this.  No stenting at that point in time.  Currently on  "metoprolol further recommendations.  Also had episode of atrial fibrillation which is controlled and they do not recommend blood thinners at this point in time.  Continue follow-up with them    Anxiety  Stable on Paxil  - PARoxetine (PAXIL) 30 MG tablet; Take 1 tablet (30 mg) by mouth daily.    Mass overlapping multiple quadrants of left breast  New for the patient as of recently also tender  - MA Diagnostic Digital Left; Future    I have spent 30 or more minutes on the date of the encounter face-to-face with the patient and coordinating care such as reviewing documentation, results, or having discussions over the phone.        Nicotine/Tobacco Cessation  He reports that he has been smoking cigarettes. He has never used smokeless tobacco.  Nicotine/Tobacco Cessation Plan  Information offered: Patient not interested at this time      BMI  Estimated body mass index is 27.07 kg/m  as calculated from the following:    Height as of this encounter: 1.77 m (5' 9.69\").    Weight as of this encounter: 84.8 kg (187 lb).             Nell Ahmadi is a 64 year old, presenting for the following health issues:  Diabetes and Forms        9/9/2024     9:11 AM   Additional Questions   Roomed by kevin   Accompanied by self     History of Present Illness       Reason for visit:  Diebetise He is missing 7 dose(s) of medications per week.    Pt says where his monitor is on his skin he is getting a rash . Has lump on left side of chest that is painful      Diabetes Follow-up    How often are you checking your blood sugar? Continuous glucose monitor  What time of day are you checking your blood sugars (select all that apply)?  Before meals and After meals  Have you had any blood sugars above 200?  Yes   Have you had any blood sugars below 70?  No  What symptoms do you notice when your blood sugar is low?  None  What concerns do you have today about your diabetes? Blood sugar is often over 200 and Other: regulating machine   Do you have " "any of these symptoms? (Select all that apply)  No numbness or tingling in feet.  No redness, sores or blisters on feet.  No complaints of excessive thirst.  No reports of blurry vision.  No significant changes to weight.  Have you had a diabetic eye exam in the last 12 months? No        BP Readings from Last 2 Encounters:   09/09/24 (!) 145/89   07/31/24 (!) 150/85     Hemoglobin A1C (%)   Date Value   10/02/2023 10.8 (H)                 Review of Systems  Constitutional, HEENT, cardiovascular, pulmonary, gi and gu systems are negative, except as otherwise noted.      Objective    BP (!) 145/89   Pulse 60   Temp 97.8  F (36.6  C) (Temporal)   Resp 18   Ht 1.77 m (5' 9.69\")   Wt 84.8 kg (187 lb)   SpO2 100%   BMI 27.07 kg/m    Body mass index is 27.07 kg/m .  Physical Exam  Vitals and nursing note reviewed.   Constitutional:       General: He is not in acute distress.     Appearance: Normal appearance. He is not ill-appearing, toxic-appearing or diaphoretic.   HENT:      Head: Normocephalic and atraumatic.      Right Ear: Tympanic membrane, ear canal and external ear normal. There is no impacted cerumen.      Left Ear: Tympanic membrane, ear canal and external ear normal. There is no impacted cerumen.      Nose: Nose normal. No congestion or rhinorrhea.      Mouth/Throat:      Mouth: Mucous membranes are moist.      Pharynx: Oropharynx is clear. No oropharyngeal exudate or posterior oropharyngeal erythema.   Eyes:      General: No scleral icterus.        Right eye: No discharge.         Left eye: No discharge.      Extraocular Movements: Extraocular movements intact.      Conjunctiva/sclera: Conjunctivae normal.      Pupils: Pupils are equal, round, and reactive to light.   Cardiovascular:      Rate and Rhythm: Normal rate and regular rhythm.      Heart sounds: No murmur heard.  Pulmonary:      Effort: Pulmonary effort is normal. No respiratory distress.      Breath sounds: Normal breath sounds. No stridor. "   Abdominal:      General: There is no distension.      Palpations: Abdomen is soft.      Tenderness: There is no abdominal tenderness.      Hernia: No hernia is present.   Musculoskeletal:         General: Normal range of motion.      Cervical back: Normal range of motion.      Right lower leg: No edema.      Left lower leg: No edema.   Lymphadenopathy:      Cervical: No cervical adenopathy.   Skin:     General: Skin is warm.      Comments: Tenderness to palpation of a mobile mass over the left breast just under the areola as well as superior and lateral to it   Neurological:      Mental Status: He is alert.   Psychiatric:         Mood and Affect: Mood normal.         Behavior: Behavior normal.         Thought Content: Thought content normal.         Judgment: Judgment normal.                    Signed Electronically by: JENISE WING MD

## 2024-09-13 NOTE — RESULT ENCOUNTER NOTE
Galdino,    A1c is stable at 8.9.  Continue to follow-up with endocrinology    All other labs within normal range or stable      If you have any questions feel free to call the clinic at [phone].      Thank you,    Kevin Curtis MD

## 2024-09-16 ENCOUNTER — TELEPHONE (OUTPATIENT)
Dept: GASTROENTEROLOGY | Facility: CLINIC | Age: 64
End: 2024-09-16
Payer: COMMERCIAL

## 2024-09-16 DIAGNOSIS — Z12.11 SPECIAL SCREENING FOR MALIGNANT NEOPLASMS, COLON: Primary | ICD-10-CM

## 2024-09-16 NOTE — TELEPHONE ENCOUNTER
"Endoscopy Scheduling Screen    Have you had any respiratory illness or flu-like symptoms in the last 10 days?  No    What is your communication preference for Instructions and/or Bowel Prep?   MyChart    What insurance is in the chart?  Other:  [employer]    Ordering/Referring Provider: JENISE WING   (If ordering provider performs procedure, schedule with ordering provider unless otherwise instructed. )    BMI: Estimated body mass index is 27.07 kg/m  as calculated from the following:    Height as of 9/9/24: 1.77 m (5' 9.69\").    Weight as of 9/9/24: 84.8 kg (187 lb).     Sedation Ordered  moderate sedation.   If patient BMI > 50 do not schedule in ASC.    If patient BMI > 45 do not schedule at [facility].    Are you taking methadone or Suboxone?  NO, No RN review required.    Have you been diagnosed and are being treated for severe PTSD or severe anxiety?  NO, No RN review required.    Are you taking any prescription medications for pain 3 or more times per week?   NO, No RN review required.    Do you have a history of malignant hyperthermia?  No    (Females) Are you currently pregnant?   No     Have you been diagnosed or told you have pulmonary hypertension?   No    Do you have an LVAD?  No    Have you been told you have moderate to severe sleep apnea?  No.    Have you been told you have COPD, asthma, or any other lung disease?  Yes     What breathing problems do you have?  COPD     Do you use home oxygen?  No    Have your breathing problems required an ED visit or hospitalization in the last year?  No.    Do you have any heart conditions?  No     Have you ever had or are you waiting for an organ transplant?  No. Continue scheduling, no site restrictions.    Have you had a stroke or transient ischemic attack (TIA aka \"mini stroke\" in the last 6 months?   No    Have you been diagnosed with or been told you have cirrhosis of the liver?   No.    Are you currently on dialysis?   No    Do you need assistance " "transferring?   No    BMI: Estimated body mass index is 27.07 kg/m  as calculated from the following:    Height as of 9/9/24: 1.77 m (5' 9.69\").    Weight as of 9/9/24: 84.8 kg (187 lb).     Is patients BMI > 40 and scheduling location [facility]?  No    Do you take an injectable or oral medication for weight loss or diabetes (excluding insulin)?  No    Do you take the medication Naltrexone?  No    Do you take blood thinners?  No       Prep   Are you currently on dialysis or do you have chronic kidney disease?  No    Do you have a diagnosis of diabetes?  Yes (Golytely Prep)    Do you have a diagnosis of cystic fibrosis (CF)?  No    On a regular basis do you go 3 -5 days between bowel movements?  No    BMI > 40?  No    Preferred Pharmacy:    [facility] [address] [place], [place] - [address]  [address]  [facility] [address]  Phone: [phone] Fax: [phone]      Final Scheduling Details     Procedure scheduled  Colonoscopy    Surgeon:  FRANK     Date of procedure:  9/30/24     Pre-OP / PAC:   No - Not required for this site.    Location  [facility] - Per order.    Sedation   MAC/Deep Sedation  Per location.      Patient Reminders:   You will receive a call from a Nurse to review instructions and health history.  This assessment must be completed prior to your procedure.  Failure to complete the Nurse assessment may result in the procedure being cancelled.      On the day of your procedure, please designate an adult(s) who can drive you home stay with you for the next 24 hours. The medicines used in the exam will make you sleepy. You will not be able to drive.      You cannot take public transportation, ride share services, or non-medical taxi service without a responsible caregiver.  Medical transport services are allowed with the requirement that a responsible caregiver will receive you at your destination.  We require that drivers and caregivers are confirmed prior to your procedure.    "

## 2024-09-17 RX ORDER — BISACODYL 5 MG/1
TABLET, DELAYED RELEASE ORAL
Qty: 4 TABLET | Refills: 0 | Status: SHIPPED | OUTPATIENT
Start: 2024-09-17

## 2024-09-17 NOTE — TELEPHONE ENCOUNTER
Standard Golytely Bowel Prep recommended due to standard bowel prep. and diabetes.  Instructions were sent via [employer]. Bowel prep was sent 9/17/2024 to [facility] [address] [place] [address] - [address]

## 2024-09-29 RX ORDER — LIDOCAINE 40 MG/G
CREAM TOPICAL
Status: CANCELLED | OUTPATIENT
Start: 2024-09-29

## 2024-09-30 ENCOUNTER — HOSPITAL ENCOUNTER (OUTPATIENT)
Facility: CLINIC | Age: 64
Discharge: HOME | End: 2024-09-30
Attending: SPECIALIST | Admitting: SPECIALIST
Payer: COMMERCIAL

## 2024-09-30 ENCOUNTER — TELEPHONE (OUTPATIENT)
Dept: GASTROENTEROLOGY | Facility: CLINIC | Age: 64
End: 2024-09-30
Payer: COMMERCIAL

## 2024-09-30 PROCEDURE — 999N000141 HC STATISTIC PRE-PROCEDURE NURSING ASSESSMENT: Performed by: SPECIALIST

## 2024-09-30 ASSESSMENT — ACTIVITIES OF DAILY LIVING (ADL): ADLS_ACUITY_SCORE: 37

## 2024-09-30 NOTE — OR NURSING
S: Patient arrived to have colonoscopy done with Dr. Evans today.  B:  A: Patient drank Golytely prep yesterday and also ate Lasagna at 2030 last night.  Dr. Evans informed of situation.  R: Patient will be cancelled for today.  Message sent thru patient's chart to Endo scheduling pool for them to contact patient to reschedule.  A copy of the prep instructions that were sent thru patient's My Chart were printed and given to patient.

## 2024-09-30 NOTE — TELEPHONE ENCOUNTER
Caller: Galdino Nelson        Rescheduled: Yes,   Procedure: Lower Endoscopy [Colonoscopy]    Date: 11/14/2024   Location: [facility] [place]; [address] [place] [address] , [place] [address]   Surgeon: MARY   Sedation Level Scheduled  MAC ,  Reason for Sedation Level PER SITE   Instructions updated and sent: Y     Does patient need PAC or Pre -Op Rescheduled? : N       Did you cancel or rescheduled an EUS procedure? No.

## 2024-09-30 NOTE — H&P
[facility] History and Physical    Galdino Nelson MRN# [id number]   Age: 64 year old YOB: 1960     Date of Admission:  (Not on file)    Home clinic: [facility]  Primary care provider: Kevin Curtis          Impression and Plan:   Impression:   Screen for colon cancer [Z12.11]  No prior coloscopy in system      Plan:   Proceed to Colonoscopy as planned.  The procedure, risks(bleeding, perforation), benefits and alternatives were discussed and the patient agrees to proceed. Cleared for Anesthesia             Chief Complaint:   Screen for colon cancer [Z12.11]    History is obtained from the patient          History of Present Illness:   This 64 year old male is being seen at this time for evaluation of ***           Past Medical History:     Past Medical History:   Diagnosis Date    Chronic obstructive pulmonary disease (H) 11/18/2011            Past Surgical History:     Past Surgical History:   Procedure Laterality Date    HERNIA REPAIR      ORTHOPEDIC SURGERY              Social History:     Social History     Tobacco Use    Smoking status: Every Day     Current packs/day: 1.00     Types: Cigarettes    Smokeless tobacco: Never   Substance Use Topics    Alcohol use: Not Currently            Family History:   History reviewed. No pertinent family history.         Immunizations:     VACCINE/DOSE   Diptheria   DPT   DTAP   HBIG   Hepatitis A   Hepatitis B   HIB   Influenza   Measles   Meningococcal   MMR   Mumps   Pneumococcal   Polio   Rubella   Small Pox   TDAP   Varicella   Zoster            Allergies:   No Known Allergies         Medications:     No current facility-administered medications for this encounter.     Current Outpatient Medications   Medication Sig Dispense Refill    albuterol (PROAIR HFA/PROVENTIL HFA/VENTOLIN HFA) 108 (90 Base) MCG/ACT inhaler Inhale 2 puffs into the lungs every 6 hours as needed for shortness of breath, wheezing or cough 18 g 0     bisacodyl (DULCOLAX) 5 MG EC tablet Take 2 tablets at 3 pm the day before your procedure. If your procedure is before 11 am, take 2 additional tablets at 11 pm. If your procedure is after 11 am, take 2 additional tablets at 6 am. For additional instructions refer to your colonoscopy prep instructions. 4 tablet 0    blood glucose (ACCU-CHEK GUIDE) test strip 1 strip by In Vitro route 3 times daily      Continuous Glucose Sensor (DEXCOM G6 SENSOR) MISC Change every 10 days      Continuous Glucose Transmitter (DEXCOM G6 TRANSMITTER) MISC Use with sensor      Insulin Disposable Pump (OMNIPOD 5 INTRO, GEN 5,) KIT       Insulin Disposable Pump (OMNIPOD 5 PODS, GEN 5,) MISC 1 each every 72 hours      metoprolol succinate ER (TOPROL XL) 25 MG 24 hr tablet Take 1 tablet (25 mg) by mouth daily at 2 pm. 30 tablet 0    nitroGLYcerin (NITROSTAT) 0.4 MG sublingual tablet Place 0.4 mg under the tongue every 5 minutes as needed for chest pain      NOVOLOG RELION 100 UNIT/ML vial Inject 120 Units subcutaneously every 3 days In omnipod disposable pump      PARoxetine (PAXIL) 30 MG tablet Take 1 tablet (30 mg) by mouth daily. 90 tablet 0    polyethylene glycol (GOLYTELY) 236 g suspension The night before the exam at 6 pm drink an 8-ounce glass every 15 minutes until the jug is half empty. If you arrive before 11 AM: Drink the other half of the Golytely jug at 11 PM night before procedure. If you arrive after 11 AM: Drink the other half of the Golytely jug at 6 AM day of procedure. For additional instructions refer to your colonoscopy prep instructions. 4000 mL 0             Review of Systems:   The review of systems was positive for the following findings.  ***.  The remainder of the review of systems was unremarkable.          Physical Exam:   All vitals have been reviewed  There were no vitals taken for this visit.  No intake or output data in the 24 hours ending 09/29/24 2135  SHEENT examination revealed ***.  Examination of the  chest revealed ***.  Examination of the heart revealed ***.  Examination of the abdomen revealed ***.  The neuromuscular examination was ***.          Data:   All laboratory data reviewed  No results found for any visits on 09/30/24.  {   Imaging                     :[id number]}     Lucas Evans MD, FACS

## 2024-09-30 NOTE — TELEPHONE ENCOUNTER
Caller: Writer to patient.     Reason for Reschedule/Cancellation   (please be detailed, any staff messages or encounters to note?): Was not prepped.       Prior to reschedule please review:  Ordering Provider: Kevin Curtis  Sedation Determined: CS  Does patient have any ASC Exclusions, please identify?: No      Notes on Cancelled Procedure:  Procedure: Lower Endoscopy [Colonoscopy]   Date: 9/30/2024  Location: [facility]; [address] [place] [address] , [place] [address]  Surgeon: Cristina      Rescheduled: No, LVM and sent MyChart.        Did you cancel or rescheduled an EUS procedure? No.

## 2024-09-30 NOTE — TELEPHONE ENCOUNTER
----- Message from Nilsa BALDWIN sent at 9/30/2024 11:04 AM CDT -----  Regarding: Colonoscopy for 9/30/24 cancelled due to NPO  Please reach out to patient to reschedule.  He will need a new Rx for Golytely.  Prep instructions from My Chart printed and given to patient.    Thank you, Nilsa Junior RN  [facility]   Pre-Surgery Instructions:   Medication Instructions   • Ascorbic Acid (vitamin C) 1000 MG tablet Stop taking 7 days prior to surgery. • atorvastatin (LIPITOR) 10 mg tablet Take night before surgery   • Diclofenac Sodium (VOLTAREN) 1 % Uses PRN- DO NOT take day of surgery   • ferrous sulfate 325 (65 Fe) mg tablet Stop taking 7 days prior to surgery. • Jardiance 10 MG TABS tablet Stop taking 4 days prior to surgery. • liraglutide (VICTOZA) injection Take day of surgery. • metFORMIN (GLUCOPHAGE) 1000 MG tablet Hold day of surgery. • omeprazole (PriLOSEC) 20 mg delayed release capsule Take day of surgery. • valsartan (DIOVAN) 320 MG tablet Hold day of surgery. Medication instructions for day surgery reviewed. Please use only a sip of water to take your instructed medications. Avoid all over the counter vitamins, supplements and NSAIDS for one week prior to surgery per anesthesia guidelines. Tylenol is ok to take as needed. You will receive a call one business day prior to surgery with an arrival time and hospital directions. If your surgery is scheduled on a Monday, the hospital will be calling you on the Friday prior to your surgery. If you have not heard from anyone by 8pm, please call the hospital supervisor through the hospital  at [phone]. Derinda Gottron [phone]). Do not eat or drink anything after midnight the night before your surgery, including candy, mints, lifesavers, or chewing gum. Do not drink alcohol 24hrs before your surgery. Try not to smoke at least 24hrs before your surgery. Follow the pre surgery showering instructions as listed in the [facility] Surgical Experience Booklet” or otherwise provided by your surgeon's office. Do not shave the surgical area 24 hours before surgery. Do not apply any lotions, creams, including makeup, cologne, deodorant, or perfumes after showering on the day of your surgery. No contact lenses, eye make-up, or artificial eyelashes.  Remove nail polish, including gel polish, and any artificial, gel, or acrylic nails if possible. Remove all jewelry including rings and body piercing jewelry. Wear causal clothing that is easy to take on and off. Consider your type of surgery. Keep any valuables, jewelry, piercings at home. Please bring any specially ordered equipment (sling, braces) if indicated. Arrange for a responsible person to drive you to and from the hospital on the day of your surgery. Visitor Guidelines discussed. Call the surgeon's office with any new illnesses, exposures, or additional questions prior to surgery. Please reference your [facility] Surgical Experience Booklet” for additional information to prepare for your upcoming surgery.

## 2024-10-24 ENCOUNTER — APPOINTMENT (OUTPATIENT)
Dept: CT IMAGING | Facility: CLINIC | Age: 64
End: 2024-10-24
Attending: EMERGENCY MEDICINE
Payer: COMMERCIAL

## 2024-10-24 ENCOUNTER — HOSPITAL ENCOUNTER (EMERGENCY)
Facility: CLINIC | Age: 64
Discharge: HOME | End: 2024-10-24
Attending: EMERGENCY MEDICINE | Admitting: EMERGENCY MEDICINE
Payer: COMMERCIAL

## 2024-10-24 VITALS
HEIGHT: 70 IN | DIASTOLIC BLOOD PRESSURE: 95 MMHG | TEMPERATURE: 97.2 F | BODY MASS INDEX: 27.2 KG/M2 | SYSTOLIC BLOOD PRESSURE: 155 MMHG | OXYGEN SATURATION: 98 % | WEIGHT: 190 LBS | RESPIRATION RATE: 18 BRPM | HEART RATE: 53 BPM

## 2024-10-24 DIAGNOSIS — M51.360 DEGENERATION OF INTERVERTEBRAL DISC OF LUMBAR REGION WITH DISCOGENIC BACK PAIN: ICD-10-CM

## 2024-10-24 LAB
ALBUMIN SERPL BCG-MCNC: 4.1 G/DL (ref 3.5–5.2)
ALBUMIN UR-MCNC: NEGATIVE MG/DL
ALP SERPL-CCNC: 95 U/L (ref 40–150)
ALT SERPL W P-5'-P-CCNC: 14 U/L (ref 0–70)
ANION GAP SERPL CALCULATED.3IONS-SCNC: 11 MMOL/L (ref 7–15)
APPEARANCE UR: CLEAR
AST SERPL W P-5'-P-CCNC: 19 U/L (ref 0–45)
BASOPHILS # BLD AUTO: 0.1 10E3/UL (ref 0–0.2)
BASOPHILS NFR BLD AUTO: 1 %
BILIRUB SERPL-MCNC: 0.4 MG/DL
BILIRUB UR QL STRIP: NEGATIVE
BUN SERPL-MCNC: 14 MG/DL (ref 8–23)
CALCIUM SERPL-MCNC: 9.3 MG/DL (ref 8.8–10.4)
CHLORIDE SERPL-SCNC: 101 MMOL/L (ref 98–107)
COLOR UR AUTO: YELLOW
CREAT SERPL-MCNC: 0.93 MG/DL (ref 0.67–1.17)
EGFRCR SERPLBLD CKD-EPI 2021: >90 ML/MIN/1.73M2
EOSINOPHIL # BLD AUTO: 0.2 10E3/UL (ref 0–0.7)
EOSINOPHIL NFR BLD AUTO: 4 %
ERYTHROCYTE [DISTWIDTH] IN BLOOD BY AUTOMATED COUNT: 14 % (ref 10–15)
GLUCOSE SERPL-MCNC: 227 MG/DL (ref 70–99)
GLUCOSE UR STRIP-MCNC: >499 MG/DL
HCO3 SERPL-SCNC: 25 MMOL/L (ref 22–29)
HCT VFR BLD AUTO: 46.8 % (ref 40–53)
HGB BLD-MCNC: 15.9 G/DL (ref 13.3–17.7)
HGB UR QL STRIP: NEGATIVE
IMM GRANULOCYTES # BLD: 0 10E3/UL
IMM GRANULOCYTES NFR BLD: 0 %
KETONES UR STRIP-MCNC: NEGATIVE MG/DL
LEUKOCYTE ESTERASE UR QL STRIP: NEGATIVE
LYMPHOCYTES # BLD AUTO: 1.8 10E3/UL (ref 0.8–5.3)
LYMPHOCYTES NFR BLD AUTO: 26 %
MCH RBC QN AUTO: 29.5 PG (ref 26.5–33)
MCHC RBC AUTO-ENTMCNC: 34 G/DL (ref 31.5–36.5)
MCV RBC AUTO: 87 FL (ref 78–100)
MONOCYTES # BLD AUTO: 0.5 10E3/UL (ref 0–1.3)
MONOCYTES NFR BLD AUTO: 7 %
MUCOUS THREADS #/AREA URNS LPF: PRESENT /LPF
NEUTROPHILS # BLD AUTO: 4.3 10E3/UL (ref 1.6–8.3)
NEUTROPHILS NFR BLD AUTO: 63 %
NITRATE UR QL: NEGATIVE
NRBC # BLD AUTO: 0 10E3/UL
NRBC BLD AUTO-RTO: 0 /100
PH UR STRIP: 6 [PH] (ref 5–7)
PLATELET # BLD AUTO: 173 10E3/UL (ref 150–450)
POTASSIUM SERPL-SCNC: 3.9 MMOL/L (ref 3.4–5.3)
PROT SERPL-MCNC: 6.4 G/DL (ref 6.4–8.3)
RBC # BLD AUTO: 5.39 10E6/UL (ref 4.4–5.9)
RBC URINE: 0 /HPF
SODIUM SERPL-SCNC: 137 MMOL/L (ref 135–145)
SP GR UR STRIP: 1.01 (ref 1–1.03)
UROBILINOGEN UR STRIP-MCNC: NORMAL MG/DL
WBC # BLD AUTO: 6.9 10E3/UL (ref 4–11)
WBC URINE: 1 /HPF

## 2024-10-24 PROCEDURE — 85025 COMPLETE CBC W/AUTO DIFF WBC: CPT | Performed by: EMERGENCY MEDICINE

## 2024-10-24 PROCEDURE — 36415 COLL VENOUS BLD VENIPUNCTURE: CPT | Performed by: EMERGENCY MEDICINE

## 2024-10-24 PROCEDURE — 99285 EMERGENCY DEPT VISIT HI MDM: CPT | Mod: 25 | Performed by: EMERGENCY MEDICINE

## 2024-10-24 PROCEDURE — 74176 CT ABD & PELVIS W/O CONTRAST: CPT

## 2024-10-24 PROCEDURE — 99284 EMERGENCY DEPT VISIT MOD MDM: CPT | Performed by: EMERGENCY MEDICINE

## 2024-10-24 PROCEDURE — 250N000011 HC RX IP 250 OP 636: Performed by: EMERGENCY MEDICINE

## 2024-10-24 PROCEDURE — 81001 URINALYSIS AUTO W/SCOPE: CPT | Performed by: EMERGENCY MEDICINE

## 2024-10-24 PROCEDURE — 80053 COMPREHEN METABOLIC PANEL: CPT | Performed by: EMERGENCY MEDICINE

## 2024-10-24 PROCEDURE — 96374 THER/PROPH/DIAG INJ IV PUSH: CPT | Performed by: EMERGENCY MEDICINE

## 2024-10-24 RX ORDER — KETOROLAC TROMETHAMINE 30 MG/ML
30 INJECTION, SOLUTION INTRAMUSCULAR; INTRAVENOUS ONCE
Status: COMPLETED | OUTPATIENT
Start: 2024-10-24 | End: 2024-10-24

## 2024-10-24 RX ORDER — TRAMADOL HYDROCHLORIDE 50 MG/1
50 TABLET ORAL EVERY 8 HOURS PRN
Qty: 20 TABLET | Refills: 0 | Status: SHIPPED | OUTPATIENT
Start: 2024-10-24

## 2024-10-24 RX ADMIN — KETOROLAC TROMETHAMINE 30 MG: 30 INJECTION, SOLUTION INTRAMUSCULAR at 08:04

## 2024-10-24 ASSESSMENT — ACTIVITIES OF DAILY LIVING (ADL)
ADLS_ACUITY_SCORE: 0
ADLS_ACUITY_SCORE: 0

## 2024-10-24 NOTE — ED TRIAGE NOTES
Pt reports low right back pain for last two days with unknown cause, right sided, denies pain radiation, numbness or tingling in extremity.      Triage Assessment (Adult)       Row Name 10/24/24 0758          Triage Assessment    Airway WDL WDL        Respiratory WDL    Respiratory WDL WDL        Skin Circulation/Temperature WDL    Skin Circulation/Temperature WDL WDL        Cardiac WDL    Cardiac WDL WDL        Peripheral/Neurovascular WDL    Peripheral Neurovascular WDL WDL        Cognitive/Neuro/Behavioral WDL    Cognitive/Neuro/Behavioral WDL WDL
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
## 2024-10-25 ENCOUNTER — PATIENT OUTREACH (OUTPATIENT)
Dept: CARE COORDINATION | Facility: CLINIC | Age: 64
End: 2024-10-25
Payer: COMMERCIAL

## 2024-10-25 NOTE — PROGRESS NOTES
Clinic Care Coordination Contact  Community Health Worker Initial Outreach    CHW Initial Information Gathering:  Referral Source: ED Follow-Up  Current living arrangement:: Not Assessed  CHW Additional Questions  If ED/Hospital discharge, follow-up appointment scheduled as recommended?: No  Patient agreeable to assistance with scheduling?: No  Patient declined (specify): Patient is to follow-up as needed for pain  Medication changes made following ED/Hospital discharge?: Yes, patient to be scheduled with CC RN/SW within approx 1 business day  [facility] active?: Yes  Patient sent Social Drivers of Health questionnaire?: No    Patient accepts CC: No, declined. Patient will be sent Care Coordination introduction letter for future reference.       Yolanda Falcon MA, [facility]  Care Coordination

## 2024-10-25 NOTE — LETTER
Galdino Nelson  [address]  [place] [address]    Dear Galdino Nelson,      I am a team member within the [facility] Care Resource Center with [facility]. I recently spoke to you to ensure you were doing well following a recent visit within our health system. I also wanted to take this chance to introduce Clinic Care Coordination.     Below is a description of Clinic Care Coordination and how this team can further assist you:       The Clinic Care Coordination team is made up of a Registered Nurse, , Financial Resource Worker, and a Community Health Worker who understand and can help navigate the health care system. The goal of clinic care coordination is to help you manage your health, improve access to care, and achieve optimal health outcomes. They work alongside your provider to assist you in determining your health and social needs, obtain health care and community resources, and provide you with necessary information and education. Clinic Care Coordination can work with you through any barriers and develop a care plan that helps coordinate and strengthen the relationship between you and your care team.    If you wish to connect with the Clinic Care Coordination Team, please let your [facility] Primary Care Provider or Clinic Care Team know and they can place a referral. The Clinic Care Coordination team will then reach out by phone to further support you.    We are focused on providing you with the highest-quality healthcare experience possible.    Sincerely,   Your care team with [facility]       Yolanda Falcon MA, GERMAINE  [facility]  Care Coordination  [phone]

## 2024-10-29 ENCOUNTER — TELEPHONE (OUTPATIENT)
Dept: FAMILY MEDICINE | Facility: OTHER | Age: 64
End: 2024-10-29
Payer: COMMERCIAL

## 2024-10-29 NOTE — TELEPHONE ENCOUNTER
Patient Quality Outreach    Patient is due for the following:   Diabetes -  Eye Exam and Foot Exam  Hypertension -  BP check  Physical Preventive Adult Physical      Topic Date Due    Zoster (Shingles) Vaccine (1 of 2) Never done    Flu Vaccine (1) 09/01/2024    COVID-19 Vaccine (1 - 2024-25 season) Never done       Next Steps:   Schedule a Adult Preventative    Type of outreach:    Sent [employer] message.      Questions for provider review:    None           Yolanda Mojica
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
## 2024-11-12 ENCOUNTER — THERAPY VISIT (OUTPATIENT)
Dept: PHYSICAL THERAPY | Facility: CLINIC | Age: 64
End: 2024-11-12
Attending: EMERGENCY MEDICINE
Payer: COMMERCIAL

## 2024-11-12 ENCOUNTER — PATIENT OUTREACH (OUTPATIENT)
Dept: GASTROENTEROLOGY | Facility: CLINIC | Age: 64
End: 2024-11-12
Payer: COMMERCIAL

## 2024-11-12 ENCOUNTER — TELEPHONE (OUTPATIENT)
Dept: FAMILY MEDICINE | Facility: OTHER | Age: 64
End: 2024-11-12
Payer: COMMERCIAL

## 2024-11-12 DIAGNOSIS — Z12.11 SPECIAL SCREENING FOR MALIGNANT NEOPLASMS, COLON: Primary | ICD-10-CM

## 2024-11-12 DIAGNOSIS — M51.360 DEGENERATION OF INTERVERTEBRAL DISC OF LUMBAR REGION WITH DISCOGENIC BACK PAIN: ICD-10-CM

## 2024-11-12 PROCEDURE — 97161 PT EVAL LOW COMPLEX 20 MIN: CPT | Mod: GP | Performed by: PHYSICAL THERAPIST

## 2024-11-12 PROCEDURE — 97110 THERAPEUTIC EXERCISES: CPT | Mod: GP | Performed by: PHYSICAL THERAPIST

## 2024-11-12 RX ORDER — BISACODYL 5 MG/1
TABLET, DELAYED RELEASE ORAL
Qty: 4 TABLET | Refills: 0 | Status: SHIPPED | OUTPATIENT
Start: 2024-11-12

## 2024-11-12 NOTE — PROGRESS NOTES
Standard Golytely Bowel Prep recommended due to standard bowel prep., diabetes. , and pain med in chart was just prescribed and hasn't been on for long.  Instructions were sent via Row[employer]. Bowel prep was sent 11/12/2024 to [facility] [address] [place] [address] - [address]

## 2024-11-12 NOTE — TELEPHONE ENCOUNTER
Colonoscopy prep was resent due to patient having taken the last one but not going NPO before procedure. Resent to [facility] in [place].

## 2024-11-12 NOTE — TELEPHONE ENCOUNTER
Reason for Call:  Medication or medication refill:    Do you use a [facility]?  Name of the pharmacy and phone number for the current request:  [facility] [place] - [phone]    Name of the medication requested: colonoscopy prep    Other request: Galdino has a colonoscopy this Thursday. [facility] called [facility] [place] to say he is needing to have the prep supplies called into the [facility] in [place].     If this isn't routed to the correct pool, would you be kind enough to forward on? Thank you!    Can we leave a detailed message on this number? YES    Phone number patient can be reached at: Cell number on file:    Telephone Information:   Mobile [phone]       Best Time: any    Call taken on 11/12/2024 at 2:10 PM by Junior Campo

## 2024-11-13 ENCOUNTER — VIRTUAL VISIT (OUTPATIENT)
Dept: EDUCATION SERVICES | Facility: CLINIC | Age: 64
End: 2024-11-13
Attending: STUDENT IN AN ORGANIZED HEALTH CARE EDUCATION/TRAINING PROGRAM
Payer: COMMERCIAL

## 2024-11-13 ENCOUNTER — TELEPHONE (OUTPATIENT)
Dept: FAMILY MEDICINE | Facility: OTHER | Age: 64
End: 2024-11-13

## 2024-11-13 DIAGNOSIS — E10.65 UNCONTROLLED TYPE 1 DIABETES MELLITUS WITH HYPERGLYCEMIA (H): ICD-10-CM

## 2024-11-13 PROCEDURE — G0108 DIAB MANAGE TRN  PER INDIV: HCPCS | Mod: 95

## 2024-11-13 RX ORDER — ACYCLOVIR 400 MG/1
TABLET ORAL
Qty: 9 EACH | Refills: 1 | Status: SHIPPED | OUTPATIENT
Start: 2024-11-13

## 2024-11-13 RX ORDER — INSULIN PMP CART,AUT,G6/7,CNTR
1 EACH SUBCUTANEOUS
Qty: 30 EACH | Refills: 1 | Status: SHIPPED | OUTPATIENT
Start: 2024-11-13

## 2024-11-13 NOTE — NURSING NOTE
Current patient location:  [place]    Is the patient currently in the state of [place]? YES    Visit mode:VIDEO    If the visit is dropped, the patient can be reconnected by:VIDEO VISIT: Text to cell phone:   Telephone Information:   Mobile [phone]       Will anyone else be joining the visit? NO  (If patient encounters technical issues they should call [phone] :[phone])    Are changes needed to the allergy or medication list? No    Are refills needed on medications prescribed by this physician? NO    Rooming Documentation:  Questionnaire(s) completed    Reason for visit: Video Visit and RECHECK    Taty TORRES

## 2024-11-13 NOTE — PATIENT INSTRUCTIONS
PLAN:  *No changes to pump settings  *Recover [email or web address] username and passwords  *Walk around home or in place for 10 minutes after dinner  *Morning fasting blood sugar goal <140  *Glucose goal 2 hours after a meal is <180  *15-15 rule  *Tips for adhesive    FOLLOW-UP:    *TBD

## 2024-11-13 NOTE — PROGRESS NOTES
Virtual Visit Details    Type of service:  Video Visit   Video Start Time: 7:50 AM  Video End Time:  8:54 AM    Originating Location (pt. Location): Home    Distant Location (provider location):  Off-site  Platform used for Video Visit: Kami      Diabetes Self-Management Education & Support  Galdino Nelson presents today for education related to Type 1 diabetes    Patient is being treated with:  Omnipod 5 Insulin Pump  He is accompanied by self    Year of diagnosis: 9/2019, Flu symptoms  Referring provider:  Ever  Living Situation: Lives with son and grandkids  Employment:     PATIENT CONCERNS/REASON FOR REFERRAL   No follow up since started OP5 this past spring    ASSESSMENT:    Taking Medication:     Current Diabetes Management per Patient:  Taking diabetes medications? Novolog in OP5    Monitoring  Patient glucose self monitoring as follows: continuously using a continuous glucose monitor (CGM)  BG meter: Dexcom G6 CGM  CGM: Dexcom G6 with OP5 via controller  BG results: Not available  Pump settings:  Target midnight to midnight: 120, Correct Above 120   ICR 12-4am 1:16, 4am-6pm 1:12, 6pm-12am 1:11  Correction factor: 58  Duration of insulin: 3 hrs  11/12/2024 TIR 63%  with 2% lows  Total insulin 33.8 units    Patient's most recent   Lab Results   Component Value Date    A1C 8.9 09/09/2024      Patient's A1C goal: <7.0    Activity: Physical Therapy for back every 2 weeks and home exercises, no regular regimen    Healthy Eating:   Patient currently eats 2 meals, 1 snacks per day   Breakfast: Skips 6 days of the week, Egg and sausage croissant sandwich  Lunch: [employer]: Pizza, meat and cheese sandwich  Dinner 8pm: Pork chop, rice, salads, corn and green beans on occasion   Snack: fruit cups, cheese sticks  Pepsi Zero    Problem Solving:    Patient is at risk of hypoglycemia?: YES, partial hypoglycemia unawareness, symptoms harder to breathe, treats with butterfinger candy bar  Hospitalizations  for hyper or hypoglycemia: Yes (Please explain): DKA at diagnosis and a year ago    Healthy Coping and Stress Management:   Sources of stress identified by patient:  Other (please specify):  Not assessed   Coping mechanisms identified by patient:  Other (please specify):  Sabino    EDUCATION and INSTRUCTION PROVIDED AT THIS VISIT:    T1DM seen to review Omnipod data. He was diagnosed and admitted with DKA in 2019, discharged on insulin. Detectable C-Peptide, no record of MAY.He was on Metformin for short time however it was discontinued due to side effects. He transitioned from Medtronic 670 to OP5 this past spring by an Omnipod . This is his first follow up visit. Feels it takes a long time for glucose to drop. Unfortunately, he does not know username or passwords so unable to link pump or cgm data. He did have low yesterday after PT. He tries to bolus 10-15 minutes prior to eating, feels comfortable counting CHO's. He does have red, itchy, blistery rash from sensor adhesive around day 6-7. Will upgrade to G7 sensors.  Due to no data no pump setting changes with exception of turning reverse correction off. He will recover username/passwords so can link to clinic. I will contact him with recommendations after I view report.     Topics Reviewed:  Pathophysiology and progression of diagnosis  Sensor TIR goals  Glucose goal 2 hours after meal  Affect that carbohydrates has on blood sugar  Benefit of exercise  15-15 rule  G7 CGM/PODS  Glucose >100 to drive    Patient-stated goal written and given to Galdino Nelson.  Verbalized and demonstrated understanding of instructions.   See patient instructions  AVS printed and given to patient    PLAN:  *No changes to pump settings  *Recover [employer] username and passwords  *Walk around home or in place for 10 minutes after dinner  *Morning fasting blood sugar goal <140  *Glucose goal 2 hours after a meal is <180  *15-15 rule  *Tips for adhesive    FOLLOW-UP:     *TBD    Time spent with patient at today's visit was 64 minutes.      Cary Leiva RN, [facility]  Diabetes Education Department  [facility], [place]  Phone: [phone]  [phone]      Any diabetes medication initiation or dose changes were made via the [facility] Standing Orders per the patient's referring provider. A copy of this encounter was shared with the provider.

## 2024-11-13 NOTE — LETTER
11/13/2024      Galdino Nelson  [address]  [place] [address]      Dear Colleague,    Thank you for referring your patient, Galdino Nelson, to the [facility]. Please see a copy of my visit note below.    Virtual Visit Details    Type of service:  Video Visit   Video Start Time: 7:50 AM  Video End Time:  8:54 AM    Originating Location (pt. Location): Home    Distant Location (provider location):  Off-site  Platform used for Video Visit: "[employer]      Diabetes Self-Management Education & Support  Galdino Nelson presents today for education related to Type 1 diabetes    Patient is being treated with:  Omnipod 5 Insulin Pump  He is accompanied by self    Year of diagnosis: 9/2019, Flu symptoms  Referring provider:  Evre  Living Situation: Lives with son and grandkids  Employment:     PATIENT CONCERNS/REASON FOR REFERRAL   No follow up since started OP5 this past spring    ASSESSMENT:    Taking Medication:     Current Diabetes Management per Patient:  Taking diabetes medications? Novolog in OP5    Monitoring  Patient glucose self monitoring as follows: continuously using a continuous glucose monitor (CGM)  BG meter: Dexcom G6 CGM  CGM: Dexcom G6 with OP5 via controller  BG results: Not available  Pump settings:  Target midnight to midnight: 120, Correct Above 120   ICR 12-4am 1:16, 4am-6pm 1:12, 6pm-12am 1:11  Correction factor: 58  Duration of insulin: 3 hrs  11/12/2024 TIR 63%  with 2% lows  Total insulin 33.8 units    Patient's most recent   Lab Results   Component Value Date    A1C 8.9 09/09/2024      Patient's A1C goal: <7.0    Activity: Physical Therapy for back every 2 weeks and home exercises, no regular regimen    Healthy Eating:   Patient currently eats 2 meals, 1 snacks per day   Breakfast: Skips 6 days of the week, Egg and sausage croissant sandwich  Lunch: [employer]: Pizza, meat and cheese sandwich  Dinner 8pm: Pork chop, rice, salads, corn and green  beans on occasion   Snack: fruit cups, cheese sticks  Pepsi Zero    Problem Solving:    Patient is at risk of hypoglycemia?: YES, partial hypoglycemia unawareness, symptoms harder to breathe, treats with butterfinger candy bar  Hospitalizations for hyper or hypoglycemia: Yes (Please explain): DKA at diagnosis and a year ago    Healthy Coping and Stress Management:   Sources of stress identified by patient:  Other (please specify):  Not assessed   Coping mechanisms identified by patient:  Other (please specify):  Sabino    EDUCATION and INSTRUCTION PROVIDED AT THIS VISIT:    T1DM seen to review Omnipod data. He was diagnosed and admitted with DKA in 2019, discharged on insulin. Detectable C-Peptide, no record of MAY.He was on Metformin for short time however it was discontinued due to side effects. He transitioned from Medtronic 670 to OP5 this past spring by an Omnipod . This is his first follow up visit. Feels it takes a long time for glucose to drop. Unfortunately, he does not know username or passwords so unable to link pump or cgm data. He did have low yesterday after PT. He tries to bolus 10-15 minutes prior to eating, feels comfortable counting CHO's. He does have red, itchy, blistery rash from sensor adhesive around day 6-7. Will upgrade to G7 sensors.  Due to no data no pump setting changes with exception of turning reverse correction off. He will recover username/passwords so can link to clinic. I will contact him with recommendations after I view report.     Topics Reviewed:  Pathophysiology and progression of diagnosis  Sensor TIR goals  Glucose goal 2 hours after meal  Affect that carbohydrates has on blood sugar  Benefit of exercise  15-15 rule  G7 CGM/PODS  Glucose >100 to drive    Patient-stated goal written and given to Galdino Nelson.  Verbalized and demonstrated understanding of instructions.   See patient instructions  AVS printed and given to patient    PLAN:  *No changes to pump  settings  *Recover [email or web address] username and passwords  *Walk around home or in place for 10 minutes after dinner  *Morning fasting blood sugar goal <140  *Glucose goal 2 hours after a meal is <180  *15-15 rule  *Tips for adhesive    FOLLOW-UP:    *TBD    Time spent with patient at today's visit was 64 minutes.      Cary Leiva RN, [facility]  Diabetes Education Department  [facility], [place]  Phone: [phone]  [phone]      Any diabetes medication initiation or dose changes were made via the [facility] Standing Orders per the patient's referring provider. A copy of this encounter was shared with the provider.      Again, thank you for allowing me to participate in the care of your patient.        Sincerely,        Cary Leiva RN

## 2024-11-14 ENCOUNTER — HOSPITAL ENCOUNTER (OUTPATIENT)
Facility: CLINIC | Age: 64
Discharge: HOME | End: 2024-11-14
Attending: SURGERY | Admitting: SURGERY
Payer: COMMERCIAL

## 2024-11-14 ENCOUNTER — ANESTHESIA (OUTPATIENT)
Dept: GASTROENTEROLOGY | Facility: CLINIC | Age: 64
End: 2024-11-14
Payer: COMMERCIAL

## 2024-11-14 ENCOUNTER — ANESTHESIA EVENT (OUTPATIENT)
Dept: GASTROENTEROLOGY | Facility: CLINIC | Age: 64
End: 2024-11-14
Payer: COMMERCIAL

## 2024-11-14 VITALS
SYSTOLIC BLOOD PRESSURE: 149 MMHG | TEMPERATURE: 98.1 F | DIASTOLIC BLOOD PRESSURE: 103 MMHG | OXYGEN SATURATION: 99 % | RESPIRATION RATE: 16 BRPM | HEART RATE: 56 BPM

## 2024-11-14 LAB
COLONOSCOPY: NORMAL
GLUCOSE BLDC GLUCOMTR-MCNC: 113 MG/DL (ref 70–99)

## 2024-11-14 PROCEDURE — 370N000017 HC ANESTHESIA TECHNICAL FEE, PER MIN: Performed by: SURGERY

## 2024-11-14 PROCEDURE — 45385 COLONOSCOPY W/LESION REMOVAL: CPT | Performed by: SURGERY

## 2024-11-14 PROCEDURE — 88305 TISSUE EXAM BY PATHOLOGIST: CPT | Mod: TC | Performed by: SURGERY

## 2024-11-14 PROCEDURE — 250N000011 HC RX IP 250 OP 636: Performed by: NURSE ANESTHETIST, CERTIFIED REGISTERED

## 2024-11-14 PROCEDURE — 82962 GLUCOSE BLOOD TEST: CPT

## 2024-11-14 PROCEDURE — 250N000009 HC RX 250: Performed by: NURSE ANESTHETIST, CERTIFIED REGISTERED

## 2024-11-14 RX ORDER — ONDANSETRON 4 MG/1
4 TABLET, ORALLY DISINTEGRATING ORAL EVERY 6 HOURS PRN
Status: DISCONTINUED | OUTPATIENT
Start: 2024-11-14 | End: 2024-11-14 | Stop reason: HOSPADM

## 2024-11-14 RX ORDER — FLUMAZENIL 0.1 MG/ML
0.2 INJECTION, SOLUTION INTRAVENOUS
Status: DISCONTINUED | OUTPATIENT
Start: 2024-11-14 | End: 2024-11-14 | Stop reason: HOSPADM

## 2024-11-14 RX ORDER — NALOXONE HYDROCHLORIDE 0.4 MG/ML
0.4 INJECTION, SOLUTION INTRAMUSCULAR; INTRAVENOUS; SUBCUTANEOUS
Status: DISCONTINUED | OUTPATIENT
Start: 2024-11-14 | End: 2024-11-14 | Stop reason: HOSPADM

## 2024-11-14 RX ORDER — LIDOCAINE HYDROCHLORIDE 20 MG/ML
INJECTION, SOLUTION INFILTRATION; PERINEURAL PRN
Status: DISCONTINUED | OUTPATIENT
Start: 2024-11-14 | End: 2024-11-14

## 2024-11-14 RX ORDER — PROCHLORPERAZINE MALEATE 5 MG/1
10 TABLET ORAL EVERY 6 HOURS PRN
Status: DISCONTINUED | OUTPATIENT
Start: 2024-11-14 | End: 2024-11-14 | Stop reason: HOSPADM

## 2024-11-14 RX ORDER — NALOXONE HYDROCHLORIDE 0.4 MG/ML
0.2 INJECTION, SOLUTION INTRAMUSCULAR; INTRAVENOUS; SUBCUTANEOUS
Status: DISCONTINUED | OUTPATIENT
Start: 2024-11-14 | End: 2024-11-14 | Stop reason: HOSPADM

## 2024-11-14 RX ORDER — PROPOFOL 10 MG/ML
INJECTION, EMULSION INTRAVENOUS PRN
Status: DISCONTINUED | OUTPATIENT
Start: 2024-11-14 | End: 2024-11-14

## 2024-11-14 RX ORDER — LIDOCAINE 40 MG/G
CREAM TOPICAL
Status: DISCONTINUED | OUTPATIENT
Start: 2024-11-14 | End: 2024-11-14 | Stop reason: HOSPADM

## 2024-11-14 RX ORDER — ONDANSETRON 2 MG/ML
4 INJECTION INTRAMUSCULAR; INTRAVENOUS EVERY 6 HOURS PRN
Status: DISCONTINUED | OUTPATIENT
Start: 2024-11-14 | End: 2024-11-14 | Stop reason: HOSPADM

## 2024-11-14 RX ORDER — PROPOFOL 10 MG/ML
INJECTION, EMULSION INTRAVENOUS CONTINUOUS PRN
Status: DISCONTINUED | OUTPATIENT
Start: 2024-11-14 | End: 2024-11-14

## 2024-11-14 RX ORDER — ONDANSETRON 2 MG/ML
4 INJECTION INTRAMUSCULAR; INTRAVENOUS
Status: DISCONTINUED | OUTPATIENT
Start: 2024-11-14 | End: 2024-11-14 | Stop reason: HOSPADM

## 2024-11-14 RX ADMIN — PROPOFOL 100 MG: 10 INJECTION, EMULSION INTRAVENOUS at 11:43

## 2024-11-14 RX ADMIN — LIDOCAINE HYDROCHLORIDE 50 MG: 20 INJECTION, SOLUTION INFILTRATION; PERINEURAL at 11:43

## 2024-11-14 RX ADMIN — LIDOCAINE HYDROCHLORIDE 0.1 ML: 10 INJECTION, SOLUTION EPIDURAL; INFILTRATION; INTRACAUDAL; PERINEURAL at 10:56

## 2024-11-14 RX ADMIN — PROPOFOL 200 MCG/KG/MIN: 10 INJECTION, EMULSION INTRAVENOUS at 11:43

## 2024-11-14 ASSESSMENT — LIFESTYLE VARIABLES: TOBACCO_USE: 1

## 2024-11-14 ASSESSMENT — ACTIVITIES OF DAILY LIVING (ADL)
ADLS_ACUITY_SCORE: 0

## 2024-11-14 ASSESSMENT — COPD QUESTIONNAIRES: COPD: 1

## 2024-11-14 NOTE — ANESTHESIA PREPROCEDURE EVALUATION
Anesthesia Pre-Procedure Evaluation    Patient: Galdino Nelson   MRN: [id number] : 1960        Procedure : Procedure(s):  Colonoscopy          Past Medical History:   Diagnosis Date    Chronic obstructive pulmonary disease (H) 2011      Past Surgical History:   Procedure Laterality Date    HERNIA REPAIR      ORTHOPEDIC SURGERY        No Known Allergies   Social History     Tobacco Use    Smoking status: Every Day     Current packs/day: 1.00     Types: Cigarettes    Smokeless tobacco: Never   Substance Use Topics    Alcohol use: Not Currently      Wt Readings from Last 1 Encounters:   10/24/24 86.2 kg (190 lb)        Anesthesia Evaluation   Pt has had prior anesthetic. Type: General.    No history of anesthetic complications       ROS/MED HX  ENT/Pulmonary:     (+)                tobacco use, Current use,   patient smoked within 24 hours,      COPD,              Neurologic:     (+)      migraines,                          Cardiovascular: Comment: Paroxysmal A-fib (H)    (+)  hypertension- -   -  - -                                 Previous cardiac testing   Echo: Date: Results:    Stress Test:  Date: Results:    ECG Reviewed:  Date: 24 Results:  Sinus Rhythm   -Incomplete right bundle branch block and left axis -anterior fascicular block.  Cath:  Date: Results:      METS/Exercise Tolerance:     Hematologic:       Musculoskeletal:       GI/Hepatic:     (+)        bowel prep,            Renal/Genitourinary:  - neg Renal ROS     Endo:     (+) type I DM,  Last HgA1c: 8.9, date: 24, Using insulin, - not using insulin pump.  not previously admitted for DM/DKA.              Psychiatric/Substance Use:     (+) psychiatric history anxiety       Infectious Disease:       Malignancy:       Other:            Physical Exam    Airway        Mallampati: II   TM distance: > 3 FB   Neck ROM: full   Mouth opening: > 3 cm    Respiratory Devices and Support         Dental       (+) Minor Abnormalities - some  "fillings, tiny chips      Cardiovascular             Pulmonary                   OUTSIDE LABS:  CBC:   Lab Results   Component Value Date    WBC 6.9 10/24/2024    WBC 8.6 07/31/2024    HGB 15.9 10/24/2024    HGB 15.0 07/31/2024    HCT 46.8 10/24/2024    HCT 44.1 07/31/2024     10/24/2024     (L) 07/31/2024     BMP:   Lab Results   Component Value Date     10/24/2024     09/09/2024    POTASSIUM 3.9 10/24/2024    POTASSIUM 3.8 09/09/2024    CHLORIDE 101 10/24/2024    CHLORIDE 101 09/09/2024    CO2 25 10/24/2024    CO2 27 09/09/2024    BUN 14.0 10/24/2024    BUN 12.6 09/09/2024    CR 0.93 10/24/2024    CR 0.99 09/09/2024     (H) 10/24/2024     (H) 09/09/2024     COAGS: No results found for: \"PTT\", \"INR\", \"FIBR\"  POC: No results found for: \"BGM\", \"HCG\", \"HCGS\"  HEPATIC:   Lab Results   Component Value Date    ALBUMIN 4.1 10/24/2024    PROTTOTAL 6.4 10/24/2024    ALT 14 10/24/2024    AST 19 10/24/2024    ALKPHOS 95 10/24/2024    BILITOTAL 0.4 10/24/2024     OTHER:   Lab Results   Component Value Date    A1C 8.9 (H) 09/09/2024    REGINALD 9.3 10/24/2024    PHOS 4.0 10/06/2023    MAG 1.4 (L) 10/04/2023    LIPASE 58 10/03/2023    TSH 1.08 06/14/2022       Anesthesia Plan    ASA Status:  3    NPO Status:  NPO Appropriate    Anesthesia Type: MAC.     - Reason for MAC: immobility needed   Induction: Propofol, Intravenous.   Maintenance: TIVA.        Consents    Anesthesia Plan(s) and associated risks, benefits, and realistic alternatives discussed. Questions answered and patient/representative(s) expressed understanding.     - Discussed: Risks, Benefits and Alternatives for BOTH SEDATION and the PROCEDURE were discussed     - Discussed with:  Patient      - Extended Intubation/Ventilatory Support Discussed: No.      - Patient is DNR/DNI Status: No     Use of blood products discussed: No .     Postoperative Care            Comments:    Other Comments: The risks and benefits of anesthesia, and " "the alternatives where applicable, have been discussed with the patient, and they wish to proceed.              BABS Hernandez CRNA    I have reviewed the pertinent notes and labs in the chart from the past 30 days and (re)examined the patient.  Any updates or changes from those notes are reflected in this note.               # Hypertension: Noted on problem list          # DMII: A1C = 8.9 % (Ref range: 0.0 - 5.6 %) within past 6 months    # Overweight: Estimated body mass index is 27.26 kg/m  as calculated from the following:    Height as of 10/24/24: 1.778 m (5' 10\").    Weight as of 10/24/24: 86.2 kg (190 lb).             "

## 2024-11-14 NOTE — ANESTHESIA POSTPROCEDURE EVALUATION
Patient: Galdino Nelson    Procedure: Procedure(s):  COLONOSCOPY, FLEXIBLE, WITH LESION REMOVAL USING SNARE       Anesthesia Type:  MAC    Note:  Disposition: Outpatient   Postop Pain Control: Uneventful            Sign Out: Well controlled pain   PONV: No   Neuro/Psych: Uneventful            Sign Out: Acceptable/Baseline neuro status   Airway/Respiratory: Uneventful            Sign Out: Acceptable/Baseline resp. status   CV/Hemodynamics: Uneventful            Sign Out: Acceptable CV status   Other NRE: NONE   DID A NON-ROUTINE EVENT OCCUR? No    Event details/Postop Comments:  Pt was happy with anesthesia care.  No complications.  I will follow up with the pt if needed.           Last vitals:  Vitals Value Taken Time   /92 11/14/24 1230   Temp     Pulse 54 11/14/24 1230   Resp 16 11/14/24 1210   SpO2 99 % 11/14/24 1235   Vitals shown include unfiled device data.    Electronically Signed By: BABS Hernandez CRNA  November 14, 2024  12:36 PM

## 2024-11-14 NOTE — TELEPHONE ENCOUNTER
Hello,    I did a test claim and it seems that through his primary [facility] PMAP is too soon to refill until 12/03/2024 and their secondary states that patient has a Primary and also do initiate a PA however when initiating a PA through CoverMyMeds it rejects stating to bill Primary.    Thank You!    Kaya Plata [facility] Liaison  [facility]  [email or web address]  Phone: [phone]  Fax: [phone]      Primary [employer]([facility] PMAP):      PA initiation([place] MED 340B: [id number]

## 2024-11-14 NOTE — LETTER
Galdino Nelson  [address]  [place] [address]  November 20, 2024    Dear Galdino,  This letter is to inform you of the results of your pathology report from your colonoscopy.  Your pathology report was:  Final Diagnosis   A(1). Colon, Sigmoid, polyps, polypectomy:  -Fragments of tubular adenomas  -Negative for high-grade dysplasia and malignancy.  B(2).   Colon, Cecum, polyps, polypectomy:  -Fragments of tubular adenomas  -Negative for high-grade dysplasia and malignancy.  C(3).  Colon, Transverse, polyps, polypectomy:  -Fragments of tubular adenomas  -Negative for high-grade dysplasia and malignancy.  D(4). Colon, Descending, polyp, polypectomy:  -Tubular adenoma  -Negative for high-grade dysplasia and malignancy.   These are benign polyps. These types of polyps do carry a small risk of developing into a cancer over time if not removed. Yours were completely removed at the time of your colonoscopy. You should have another surveillance colonoscopy in 3 years.      If you have further questions please don t hesitate to call our clinic at [phone].   Sincerely,     Valentino Guerrero, DO

## 2024-11-14 NOTE — ANESTHESIA CARE TRANSFER NOTE
Patient: Galdino Nelson    Procedure: Procedure(s):  COLONOSCOPY, FLEXIBLE, WITH LESION REMOVAL USING SNARE       Diagnosis: Screen for colon cancer [Z12.11]  Diagnosis Additional Information: No value filed.    Anesthesia Type:   MAC     Note:    Oropharynx: oropharynx clear of all foreign objects and spontaneously breathing  Level of Consciousness: awake  Oxygen Supplementation: room air    Independent Airway: airway patency satisfactory and stable  Dentition: dentition unchanged  Vital Signs Stable: post-procedure vital signs reviewed and stable  Report to RN Given: handoff report given  Patient transferred to: Phase II    Handoff Report: Identifed the Patient, Identified the Reponsible Provider, Reviewed the pertinent medical history, Discussed the surgical course, Reviewed Intra-OP anesthesia mangement and issues during anesthesia, Set expectations for post-procedure period and Allowed opportunity for questions and acknowledgement of understanding      Vitals:  Vitals Value Taken Time   /92 11/14/24 1230   Temp     Pulse 54 11/14/24 1230   Resp 16 11/14/24 1210   SpO2 99 % 11/14/24 1235   Vitals shown include unfiled device data.    Electronically Signed By: BABS Hernandez CRNA  November 14, 2024  12:36 PM

## 2024-11-14 NOTE — DISCHARGE INSTRUCTIONS
[facility]    Home Care Following Endoscopy          Activity:  You have just undergone an endoscopic procedure usually performed with conscious sedation.  Do not work or operate machinery (including a car) for at least 12 hours.    I encourage you to walk and attempt to pass this air as soon as possible.    Diet:  Return to the diet you were on before your procedure but eat lightly for the first 12-24 hours.  Drink plenty of water.  Resume any regular medications unless otherwise advised by your physician.  Please begin any new medication prescribed as a result of your procedure as directed by your physician.   If you had any biopsy or polyp removed please refrain from aspirin or aspirin products for 2 days.  If on Coumadin please restart as instructed by your physician.   Pain:  You may take Tylenol as needed for pain.  Expected Recovery:  You can expect some mild abdominal fullness and/or discomfort due to the air used to inflate your intestinal tract. It is also normal to have a mild sore throat after upper endoscopy.    Call Your Physician if You Have:  After Upper Endoscopy:  Shoulder, back or chest pain.  Difficulty breathing or swallowing.  Vomiting blood.  After Colonoscopy:  Worsening persisting abdominal pain which is worse with activity.  Fevers (>101 degrees F), chills or shakes.  Passage of continued blood with bowel movements.     Any questions or concerns about your recovery, please call [phone] or after hours [phone] ([phone]) Nurse Advice Line.    Follow-up Care:  You did have polyps/biopsy tissue sample(s) removed.  The polyps/biopsy tissue sample(s) will be sent to pathology.    You should receive letter in your My Chart from Dr. Guerrero with your results within 1-2 weeks. If you do not participate in My Chart a physical letter will come in the mail in 2-3 weeks.  Please call if you have not received a notification of your results.

## 2024-11-14 NOTE — H&P
Patient seen for Endoscopy    HPI:  Patient is a 64 year old male here for endoscopy. Not taking blood thinning medications. No MI or CVA history. No issues with previous sedation. No recent acute illness.    Review Of Systems    Skin: negative  Ears/Nose/Throat: negative  Respiratory: No shortness of breath, dyspnea on exertion, cough, or hemoptysis  Cardiovascular: negative  Gastrointestinal: negative  Genitourinary: negative  Musculoskeletal: negative  Neurologic: negative  Hematologic/Lymphatic/Immunologic: negative  Endocrine: negative      Past Medical History:   Diagnosis Date    Chronic obstructive pulmonary disease (H) 11/18/2011       Past Surgical History:   Procedure Laterality Date    HERNIA REPAIR      ORTHOPEDIC SURGERY         No family history on file.    Social History     Socioeconomic History    Marital status: Single     Spouse name: Not on file    Number of children: Not on file    Years of education: Not on file    Highest education level: Not on file   Occupational History    Not on file   Tobacco Use    Smoking status: Every Day     Current packs/day: 1.00     Types: Cigarettes    Smokeless tobacco: Never   Vaping Use    Vaping status: Never Used   Substance and Sexual Activity    Alcohol use: Not Currently    Drug use: Not Currently    Sexual activity: Not Currently   Other Topics Concern    Not on file   Social History Narrative    Not on file     Social Drivers of Health     Financial Resource Strain: Low Risk  (9/9/2024)    Financial Resource Strain     Within the past 12 months, have you or your family members you live with been unable to get utilities (heat, electricity) when it was really needed?: No   Food Insecurity: Low Risk  (9/9/2024)    Food Insecurity     Within the past 12 months, did you worry that your food would run out before you got money to buy more?: No     Within the past 12 months, did the food you bought just not last and you didn t have money to get more?: No    Transportation Needs: Low Risk  (9/9/2024)    Transportation Needs     Within the past 12 months, has lack of transportation kept you from medical appointments, getting your medicines, non-medical meetings or appointments, work, or from getting things that you need?: No   Physical Activity: Not on file   Stress: Not on file   Social Connections: Socially Integrated (6/18/2024)    Received from [facility]    Social Connections     Do you often feel lonely or isolated from those around you?: 0   Interpersonal Safety: Low Risk  (11/14/2024)    Interpersonal Safety     Do you feel physically and emotionally safe where you currently live?: Yes     Within the past 12 months, have you been hit, slapped, kicked or otherwise physically hurt by someone?: No     Within the past 12 months, have you been humiliated or emotionally abused in other ways by your partner or ex-partner?: No   Housing Stability: Low Risk  (9/9/2024)    Housing Stability     Do you have housing? : Yes     Are you worried about losing your housing?: No       No current outpatient medications on file.       Medications and history reviewed    Physical exam:  Vitals: BP (!) 147/100 (BP Location: Left arm)   Pulse 61   Temp 98.1  F (36.7  C) (Temporal)   Resp 18   SpO2 96%   BMI= There is no height or weight on file to calculate BMI.    Constitutional: Healthy, alert, non-distressed   Head: Normo-cephalic, atraumatic, no lesions, masses or tenderness   Cardiovascular: RRR, no new murmurs, +S1, +S2 heart sounds, no clicks, rubs or gallops   Respiratory: CTAB, no rales, rhonchi or wheezing, equal chest rise, good respiratory effort   Gastrointestinal: Soft, non-tender, non distended, no rebound rigidity or guarding, no masses or hernias palpated   : Deferred  Musculoskeletal: Moves all extremities, normal  strength, no deformities noted   Skin: No suspicious lesions or rashes   Psychiatric: Mentation appears  normal, affect appropriate   Hematologic/Lymphatic/Immunologic: Normal cervical and supraclavicular lymph nodes   Patient able to get up on table without difficulty.    Labs show:  Results for orders placed or performed during the hospital encounter of 11/14/24 (from the past 24 hours)   Glucose by meter   Result Value Ref Range    GLUCOSE BY METER POCT 113 (H) 70 - 99 mg/dL       Assessment: Endoscopy  Plan: Pt cleared for anesthesia for proposed procedure.    Valentino Guerrero, DO

## 2024-11-18 LAB
PATH REPORT.COMMENTS IMP SPEC: NORMAL
PATH REPORT.COMMENTS IMP SPEC: NORMAL
PATH REPORT.FINAL DX SPEC: NORMAL
PATH REPORT.GROSS SPEC: NORMAL
PATH REPORT.MICROSCOPIC SPEC OTHER STN: NORMAL
PATH REPORT.RELEVANT HX SPEC: NORMAL
PHOTO IMAGE: NORMAL

## 2024-12-22 ENCOUNTER — HEALTH MAINTENANCE LETTER (OUTPATIENT)
Age: 64
End: 2024-12-22

## 2024-12-30 ENCOUNTER — TELEPHONE (OUTPATIENT)
Dept: FAMILY MEDICINE | Facility: OTHER | Age: 64
End: 2024-12-30
Payer: COMMERCIAL

## 2024-12-30 NOTE — TELEPHONE ENCOUNTER
S-(situation): Patient's daughter in law stated that patient is disoriented and blood sugar pump is reporting that is empty.  Daughter in law checked blood sugar manually and not registering.    B-(background): Type 1 DM and has insulin pump.    A-(assessment): Not out of bed, disoriented.    R-(recommendations): Call 911.  Daughter in law stated she would do this.    DELMY Lopez RN

## 2025-01-03 PROBLEM — R74.8 ELEVATED LIPASE: Status: RESOLVED | Noted: 2023-10-02 | Resolved: 2025-01-03

## 2025-01-03 PROBLEM — I25.10 CAD IN NATIVE ARTERY: Status: ACTIVE | Noted: 2018-09-26

## 2025-01-03 PROBLEM — E10.10 DIABETIC KETOACIDOSIS WITHOUT COMA ASSOCIATED WITH TYPE 1 DIABETES MELLITUS (H): Status: ACTIVE | Noted: 2024-12-30

## 2025-01-03 PROBLEM — I25.2 OLD MI (MYOCARDIAL INFARCTION): Status: ACTIVE | Noted: 2018-09-26

## 2025-01-03 PROBLEM — E78.5 HYPERLIPIDEMIA: Status: ACTIVE | Noted: 2018-09-26

## 2025-01-03 PROBLEM — I25.2 OLD MI (MYOCARDIAL INFARCTION): Status: RESOLVED | Noted: 2018-09-26 | Resolved: 2025-01-03

## 2025-01-03 PROBLEM — Z96.41 INSULIN PUMP STATUS: Status: ACTIVE | Noted: 2022-01-24

## 2025-01-03 PROBLEM — E10.10 DIABETIC KETOACIDOSIS WITHOUT COMA ASSOCIATED WITH TYPE 1 DIABETES MELLITUS (H): Status: RESOLVED | Noted: 2024-12-30 | Resolved: 2025-01-03

## 2025-01-07 ENCOUNTER — TELEPHONE (OUTPATIENT)
Dept: FAMILY MEDICINE | Facility: OTHER | Age: 65
End: 2025-01-07
Payer: COMMERCIAL

## 2025-01-07 NOTE — TELEPHONE ENCOUNTER
Forms/Letter Request    Type of form/letter: Work    Have you been seen for this request: No    Do we have the form/letter: No    When is form/letter needed by: Patient needs a return to work form filled out and or a note saying he can return to work.    How would you like the form/letter returned: Place orders within Epic    Patient Notified form requests are processed in 3-5 business days:Yes    Could we send this information to you in [employer] or would you prefer to receive a phone call?:   Patient would prefer a phone call   Okay to leave a detailed message?: Yes at Cell number on file:    Telephone Information:   Mobile [phone]

## 2025-01-07 NOTE — TELEPHONE ENCOUNTER
Patient needing note from provider stating he is cleared to drive without any restrictions since being in hospital.

## 2025-01-09 NOTE — TELEPHONE ENCOUNTER
There is a form to complete for this. I printed it and it will need his information and signature before it can be turned in. And typically his provider should complete, so I will leave it in her box to complete when she returns, but if he wants to get it to sign so it is ready to go for next week he can either print off another at home for insulin treated diabetes from [place] department of public safety or stop by to complete his half.  Brenna Huntley MD

## 2025-01-12 ENCOUNTER — MYC MEDICAL ADVICE (OUTPATIENT)
Dept: FAMILY MEDICINE | Facility: OTHER | Age: 65
End: 2025-01-12
Payer: COMMERCIAL

## 2025-01-13 NOTE — TELEPHONE ENCOUNTER
Patient signed where I am supposed to sign instead of the top of the form where he needs to sign.  A new form was printed and I signed my part, he will need to fill out and sign the top

## 2025-01-13 NOTE — TELEPHONE ENCOUNTER
Responded to patient via myc in another open encounter. Sent copy to scanning. Form in TC hold bin to see how he wants form.

## 2025-03-25 NOTE — PROGRESS NOTES
Virtual Visit Details    Type of service:  Video Visit   Originating Location (pt. Location): Home    Distant Location (provider location):  Off-site  Platform used for Video Visit: Doximity    Outcome for 03/25/25 9:18 AM: [employer] message sent  Gisselle Dias MA  Outcome for 03/27/25 7:53 AM: Replied to [employer] message  Gisselle Dias MA  Outcome for 03/31/25 2:03 PM: Left Voicemail   Gisselle Dias MA  Outcome for 03/31/25 4:10 PM: Per patient, will upload device before appointment  Gisselle Dias MA  Outcome for 04/01/25 11:47 AM:  Pt is unable to upload dexcom or omnipod from home.   Gisselle Dias MA    Assessment/Plan :   ELIZABETH, managed as Type 1 DM. Galdino knows that he needs to get his blood sugars under better control. He has been a little disappointed with the Omnipod pump. It doesn't seem to be as aggressive with correction. We discussed this and, yes the pump is not as aggressive in correcting for hyperglycemia. He needs to be more consistent with boluses and administering correction doses throughout the day. He admits that he often only looks at the pump and his blood sugar once daily. I would like him to try to look at the sensor every morning and every evening, to start. He can then correct for hyperglycemia, as needed. We also need to get him back in for a follow-up appt in office. This should make it easier to upload his insulin pump data, which will allow us to make further corrections. I will also place another referral to our CDE team for closer follow-up. Lastly, he is due for routine laboratory testing and I will place an order today.    Due to the COVID 19 pandemic this visit was a telephone/video visit in order to help prevent spread of infection in this patient and the general population. The patient gave verbal consent for the visit today. I have independently reviewed and interpreted labs, imaging as indicated.       Distant Location (provider location):  Off-site  Mode of  Communication:  Video Conference via [employer]  Chart review/prep time 10    Patient visit on April 2, 2025   Started at 7:33 AM   Ended at 7:52 AM   Total length of 18m48s  Visit was conducted over ALOHA . Patient was informed of the benefits and limitations of telemedicine. Patient consented to real time communication over audio, video, and/or data.  Signed, TRACY Grant    32 minutes spent on the date of the encounter doing chart review, history and exam, documentation and further activities as noted above.      Chief complaint:  Galdino is a 65 year old male who comes to our office to establish care for the management of Type 1 DM.    I have reviewed Care Everywhere including [facility], [facility], [facility], [facility], [place], [facility], [facility] , [facility], [place] lab reports, imaging reports and provider notes as indicated.      HISTORY OF PRESENT ILLNESS  Galdino was diagnosed with diabetes in 2019. At the time of diagnosis, he had been experiencing symptoms of dehydration. Laboratory testing found him to be hyperglycemic with a hemoglobin A1C of 10.7%. He was started on metformin and insulin. He did have the presence MAY antibodies with an elevated c-peptide. He was unable to continue with metformin due to upset stomach but remained on MDI therapy. He has a strong family history of diabetes with multiple family members using the Medtronic insulin pump. He transitioned to the Medtronic insulin pump in January of 2022.    Galdino feels like insulin pump therapy helped with his overall control. However, he did not like the tubing. He felt like it was more of a hassle, so in October of 2023, he switched to the Omnipod 5 insulin pump. He likes the Omnipod but he does not feel like it has controlled his blood sugars as well as the Medtronic. He feels like he needs to have the settings adjusted. He reports that his settings have not been changed since he started on the pump. His starting  settings were as follows:    Time Basal Rate Carb Ratio  Sensitivity Target BG   0000 1 unit(s)/hr 1u:16g 58 mg/dl 100-120 mg/dl   0400 1 unit(s)/hr 1u:12g 58 mg/dl 100-120 mg/dl   0500 0.8 unit(s)/hr 1u:12g 58 mg/dl 100-120 mg/dl   0600 0.8 unit(s)/hr 1u:12g 58 mg/dl  mg/dl   1800 0.8 unit(s)/hr 1u:11g 58 mg/dl  mg/dl   2200 0.8 unit(s)/hr 1u:11g 58 mg/dl 100-120 mg/dl     Galdino uses the Dexcom G7 sensor to monitor his blood sugars closely. He has a history of two hospitalizations for DKA, with the most recent one being in December of 2024. At that time, he doesn't think the insulin was working or he may have had a clogged infusion site. He is not sure. His blood sugars increased to 500 mg/dl and would not come down, despite administering more insulin. Since that time, he has been trying to be more consistent with glucose control. He does experience a lot of glucose fluctuation and it is not uncommon for his blood sugars to fluctuate from 80 mg/dl up to 400 mg/dl, within a day.    Galdino has no history of diabetic retinopathy and he has not noticed any significant changes in his vision. He also has no history of numbness/tingling in his feet. Overall, he is feeling good. He knows that he needs to get his blood sugars under better control, but he is doing okay. His diabetes is complicated by hyperlipidemia, history of paroxysmal a-fib, CAD, COPD, and HTN.    Endocrine relevant labs are as follows:   Latest Reference Range & Units 01/03/25 11:06   Hemoglobin A1C 0.0 - 5.6 % 9.4 (H)   (H): Data is abnormally high  Component  Ref Range & Units 3 mo ago   HEMOGLOBIN A1C MONITORING  <=6.4 % 9.5 High    Resulting Agency [facility]   Narrative  Performed by [facility]  (<=6.9%)           Indicates good control  (7.0% to 7.9%)     Indicates fair control  (>=8.0%)           Indicates poor control       NOTE:  These thresholds are guidelines and            individual targets may vary.  Specimen  Collected: 12/30/24  3:22 PM    Performed by: [facility] Last Resulted: 12/30/24 10:55 PM     Component  Ref Range & Units 9 mo ago   ALB RAND URINE  mg/L 15.6   CREATININE,URINE  g/L 1.80   ALBUMIN TO CREATININE RATIO,RAND UR  <30.0 mg/g creat 8.7   Resulting Agency [facility]   Narrative  Performed by [facility]  If Albumin to Creatinine Ratio is elevated, consider the following:       Elevations seen with incipient nephropathy associated     with diabetes mellitus or hypertension. Stress, exercise,hematuria,     and urinary tract infection may also produce elevated results.    If clinically indicated, confirm with     24 Hour Albumin to Creatinine Ratio.     Specimen Collected: 06/18/24  3:17 PM    Performed by: [facility] Last Resulted: 06/19/24 12:22 AM     Component  Ref Range & Units 5 yr ago Comments   GAD65 Ab Assay  <=0.02 nmol/L 3.09 High  The following antibody was identified: Glutamic Acid  Decarboxylase. * This profile is consistent with  neurological autoimmunity and predisposition to  thyrogastric disorders, including thyroiditis, pernicious  anemia and type 1 diabetes.  GAD65 autoimmunity may be  associated with multifocal neurological manifestations in  the appropriate clinical context. *    -------------------ADDITIONAL INFORMATION-------------------  This test was developed and its performance characteristics  determined by [facility] in a manner consistent with CLIA  requirements. This test has not been cleared or approved by  the [place]. Food and Drug Administration.    Test Performed by:  [address] [address]  : Kailash Tom M.D. Ph.D.; CLIA# [id number]   Resulting Agency [facility]    Specimen Collected: 09/29/19  5:49 AM    Performed by: [facility] Last Resulted: 10/03/19  10:07 PM     REVIEW OF SYSTEMS    Endocrine: positive for diabetes  Skin: negative  Eyes: negative for, visual blurring, redness, tearing  Ears/Nose/Throat: negative  Respiratory: No shortness of breath, dyspnea on exertion, cough, or hemoptysis  Cardiovascular: negative for, chest pain, lower extremity edema, and exercise intolerance  Gastrointestinal: negative for, nausea, vomiting, constipation, and diarrhea  Genitourinary: negative for, nocturia, dysuria, frequency, and urgency  Musculoskeletal: negative for, muscular weakness, nocturnal cramping, and foot pain  Neurologic: negative for, local weakness, numbness or tingling of hands, and numbness or tingling of feet  Psychiatric: negative  Hematologic/Lymphatic/Immunologic: negative    Past Medical History  Past Medical History:   Diagnosis Date    Chronic obstructive pulmonary disease (H) 11/18/2011    Diabetes mellitus type 1 (H)        Medications  Current Outpatient Medications   Medication Sig Dispense Refill    atorvastatin (LIPITOR) 40 MG tablet Take 1 tablet (40 mg) by mouth daily. 90 tablet 3    blood glucose (ACCU-CHEK GUIDE) test strip 1 strip by In Vitro route 3 times daily      Continuous Glucose Sensor (DEXCOM G7 SENSOR) MISC Change every 10 days. 9 each 1    Insulin Disposable Pump (OMNIPOD 5 G7 PODS, GEN 5,) MISC 1 each every 3 days. 30 each 1    metoprolol succinate ER (TOPROL XL) 25 MG 24 hr tablet Take 1 tablet (25 mg) by mouth daily at 2 pm. 90 tablet 0    nitroGLYcerin (NITROSTAT) 0.4 MG sublingual tablet Place 0.4 mg under the tongue every 5 minutes as needed for chest pain      NOVOLOG RELION 100 UNIT/ML vial Inject 120 Units subcutaneously every 3 days In omnipod disposable pump      PARoxetine (PAXIL) 30 MG tablet Take 1 tablet (30 mg) by mouth daily. 90 tablet 0    albuterol (PROAIR HFA/PROVENTIL HFA/VENTOLIN HFA) 108 (90 Base) MCG/ACT inhaler Inhale 2 puffs into the lungs every 6 hours as needed for shortness of breath, wheezing or  cough (Patient not taking: Reported on 1/3/2025) 18 g 0    Continuous Glucose Sensor (DEXCOM G6 SENSOR) MISC Change every 10 days (Patient not taking: Reported on 4/2/2025)      Continuous Glucose Transmitter (DEXCOM G6 TRANSMITTER) MISC Use with sensor (Patient not taking: Reported on 4/2/2025)      traZODone (DESYREL) 50 MG tablet Take 1-2 tablets ( mg) by mouth nightly as needed for sleep. (Patient not taking: Reported on 4/2/2025) 30 tablet 1       Allergies  No Known Allergies    Family History  family history is not on file.    Social History  Social History     Tobacco Use    Smoking status: Every Day     Current packs/day: 1.00     Types: Cigarettes    Smokeless tobacco: Never   Vaping Use    Vaping status: Never Used   Substance Use Topics    Alcohol use: Not Currently    Drug use: Not Currently       Physical Exam  Body mass index is 25.83 kg/m .  GENERAL: no distress  SKIN: Visible skin clear. No significant rash, abnormal pigmentation or lesions.  EYES: Eyes grossly normal to inspection.  No discharge or erythema, or obvious scleral/conjunctival abnormalities.  NECK: visible goiter is not present; no visible neck masses  RESP: No audible wheeze, cough, or visible cyanosis.  No visible retractions or increased work of breathing.    NEURO: Awake, alert, responds appropriately to questions.  Mentation and speech fluent.  PSYCH:affect normal and appearance well-groomed.      DATA REVIEW  He is unable to upload CGMS data

## 2025-03-31 ENCOUNTER — TELEPHONE (OUTPATIENT)
Dept: ENDOCRINOLOGY | Facility: CLINIC | Age: 65
End: 2025-03-31
Payer: COMMERCIAL

## 2025-03-31 NOTE — TELEPHONE ENCOUNTER
Called patient and left voicemail. Patient has appointment on  4/2/25 . Need to collect 14 days of blood sugar readings if patient is using meter, or if patient is using CGM, need to get patients device uploaded to retrieve report for provider to review.  Gisselle Dias MA

## 2025-03-31 NOTE — TELEPHONE ENCOUNTER
"Spoke with patient. Pt is using dexcom and omnipod. Pt's dexcom mitzy was not responding. Instructions sent via [employer]. Pt will try and manually upload omnipod using computer tonight. Pt voiced understanding instructions were sent via [employer] listed under \"computer\". Will check back on sites 4/1/25 and call patient if no data available. Gisselle Dias CMA on 3/31/2025 at 4:12 PM    "

## 2025-04-01 NOTE — TELEPHONE ENCOUNTER
Spoke with patient. Pt is unable to upload omnipod to glooko or get logged into dexcom mitzy. Unable to to provide dexcom or glooko report for his appt on 4/2/25. Gisselle Dias CMA on 4/1/2025 at 11:49 AM

## 2025-04-02 ENCOUNTER — LAB (OUTPATIENT)
Dept: LAB | Facility: CLINIC | Age: 65
End: 2025-04-02
Payer: COMMERCIAL

## 2025-04-02 ENCOUNTER — VIRTUAL VISIT (OUTPATIENT)
Dept: ENDOCRINOLOGY | Facility: CLINIC | Age: 65
End: 2025-04-02
Payer: MEDICARE

## 2025-04-02 VITALS — WEIGHT: 180 LBS | BODY MASS INDEX: 25.77 KG/M2 | HEIGHT: 70 IN

## 2025-04-02 DIAGNOSIS — F41.9 ANXIETY: ICD-10-CM

## 2025-04-02 DIAGNOSIS — E13.9 LADA (LATENT AUTOIMMUNE DIABETES IN ADULTS), MANAGED AS TYPE 1 (H): Primary | ICD-10-CM

## 2025-04-02 DIAGNOSIS — E10.65 UNCONTROLLED TYPE 1 DIABETES MELLITUS WITH HYPERGLYCEMIA (H): ICD-10-CM

## 2025-04-02 LAB
ALBUMIN SERPL BCG-MCNC: 4.5 G/DL (ref 3.5–5.2)
ALP SERPL-CCNC: 95 U/L (ref 40–150)
ALT SERPL W P-5'-P-CCNC: 16 U/L (ref 0–70)
ANION GAP SERPL CALCULATED.3IONS-SCNC: 10 MMOL/L (ref 7–15)
AST SERPL W P-5'-P-CCNC: 17 U/L (ref 0–45)
BILIRUB SERPL-MCNC: 0.6 MG/DL
BUN SERPL-MCNC: 14.5 MG/DL (ref 8–23)
CALCIUM SERPL-MCNC: 9.5 MG/DL (ref 8.8–10.4)
CHLORIDE SERPL-SCNC: 99 MMOL/L (ref 98–107)
CHOLEST SERPL-MCNC: 171 MG/DL
CREAT SERPL-MCNC: 0.93 MG/DL (ref 0.67–1.17)
CREAT UR-MCNC: 81.9 MG/DL
EGFRCR SERPLBLD CKD-EPI 2021: >90 ML/MIN/1.73M2
EST. AVERAGE GLUCOSE BLD GHB EST-MCNC: 212 MG/DL
FASTING STATUS PATIENT QL REPORTED: YES
FASTING STATUS PATIENT QL REPORTED: YES
GLUCOSE SERPL-MCNC: 223 MG/DL (ref 70–99)
HBA1C MFR BLD: 9 %
HCO3 SERPL-SCNC: 28 MMOL/L (ref 22–29)
HDLC SERPL-MCNC: 66 MG/DL
LDLC SERPL CALC-MCNC: 93 MG/DL
MICROALBUMIN UR-MCNC: <12 MG/L
MICROALBUMIN/CREAT UR: NORMAL MG/G{CREAT}
NONHDLC SERPL-MCNC: 105 MG/DL
POTASSIUM SERPL-SCNC: 3.7 MMOL/L (ref 3.4–5.3)
PROT SERPL-MCNC: 6.8 G/DL (ref 6.4–8.3)
SODIUM SERPL-SCNC: 137 MMOL/L (ref 135–145)
TRIGL SERPL-MCNC: 62 MG/DL
TSH SERPL DL<=0.005 MIU/L-ACNC: 1.03 UIU/ML (ref 0.3–4.2)

## 2025-04-02 PROCEDURE — 84681 ASSAY OF C-PEPTIDE: CPT

## 2025-04-02 PROCEDURE — 82570 ASSAY OF URINE CREATININE: CPT

## 2025-04-02 PROCEDURE — 80053 COMPREHEN METABOLIC PANEL: CPT

## 2025-04-02 PROCEDURE — 1126F AMNT PAIN NOTED NONE PRSNT: CPT | Mod: 95 | Performed by: PHYSICIAN ASSISTANT

## 2025-04-02 PROCEDURE — 98002 SYNCH AUDIO-VIDEO NEW MOD 45: CPT | Performed by: PHYSICIAN ASSISTANT

## 2025-04-02 PROCEDURE — 84443 ASSAY THYROID STIM HORMONE: CPT

## 2025-04-02 PROCEDURE — 83036 HEMOGLOBIN GLYCOSYLATED A1C: CPT

## 2025-04-02 PROCEDURE — 80061 LIPID PANEL: CPT

## 2025-04-02 PROCEDURE — 82043 UR ALBUMIN QUANTITATIVE: CPT

## 2025-04-02 PROCEDURE — 36415 COLL VENOUS BLD VENIPUNCTURE: CPT

## 2025-04-02 RX ORDER — PAROXETINE 30 MG/1
30 TABLET, FILM COATED ORAL DAILY
Qty: 30 TABLET | Refills: 0 | Status: SHIPPED | OUTPATIENT
Start: 2025-04-02

## 2025-04-02 RX ORDER — INSULIN ASPART 100 [IU]/ML
INJECTION, SOLUTION INTRAVENOUS; SUBCUTANEOUS
Qty: 50 ML | Refills: 3 | Status: SHIPPED | OUTPATIENT
Start: 2025-04-02

## 2025-04-02 ASSESSMENT — PAIN SCALES - GENERAL: PAINLEVEL_OUTOF10: NO PAIN (0)

## 2025-04-02 NOTE — LETTER
4/2/2025      Galdino Nelson  [address]  [place] [address]      Dear Colleague,    Thank you for referring your patient, Galdino Nelson, to the [facility]. Please see a copy of my visit note below.    Virtual Visit Details    Type of service:  Video Visit   Originating Location (pt. Location): Home  {PROVIDER LOCATION On-site should be selected for visits conducted from your clinic location or adjoining [facility] hospital, academic office, or other nearby [facility] building. Off-site should be selected for all other provider locations, including home:[id number]}  Distant Location (provider location):  Off-site  Platform used for Video Visit: Doximity    Outcome for 03/25/25 9:18 AM: [employer] message sent  Gisselle Dias MA  Outcome for 03/27/25 7:53 AM: Replied to [employer] message  Gisselle Dias MA  Outcome for 03/31/25 2:03 PM: Left Voicemail   Gisselle Dias MA  Outcome for 03/31/25 4:10 PM: Per patient, will upload device before appointment  Gisselle Dias MA  Outcome for 04/01/25 11:47 AM:  Pt is unable to upload dexcom or omnipod from home.   Gisselle Dias MA    Assessment/Plan :   ELIZABETH, managed as Type 1 DM. Galdino knows that he needs to get his blood sugars under better control. He has been a little disappointed with the Omnipod pump. It doesn't seem to be as aggressive with correction. We discussed this and, yes the pump is not as aggressive in correcting for hyperglycemia. He needs to be more consistent with boluses and administering correction doses throughout the day. He admits that he often only looks at the pump and his blood sugar once daily. I would like him to try to look at the sensor every morning and every evening, to start. He can then correct for hyperglycemia, as needed. We also need to get him back in for a follow-up appt in office. This should make it easier to upload his insulin pump data, which will allow us to make further corrections. I will also  place another referral to our CDE team for closer follow-up. Lastly, he is due for routine laboratory testing and I will place an order today.    Due to the COVID 19 pandemic this visit was a telephone/video visit in order to help prevent spread of infection in this patient and the general population. The patient gave verbal consent for the visit today. I have independently reviewed and interpreted labs, imaging as indicated.     {PROVIDER LOCATION On-site should be selected for visits conducted from your clinic location or adjoining [facility] hospital, academic office, or other nearby [facility] building. Off-site should be selected for all other provider locations, including home:[id number]}  Distant Location (provider location):  Off-site  Mode of Communication:  Video Conference via [employer]  Chart review/prep time 10    Patient visit on April 2, 2025    Started at 7:33 AM    Ended at 7:52 AM    Total length of 18m48s  Visit was conducted over [employer] . Patient was informed of the benefits and limitations of telemedicine. Patient consented to real time communication over audio, video, and/or data.  Signed, TRACY Grant    32 minutes spent on the date of the encounter doing chart review, history and exam, documentation and further activities as noted above.      Chief complaint:  Galdino is a 65 year old male who comes to our office to establish care for the management of Type 1 DM.    I have reviewed Care Everywhere including [facility], [facility], [facility], [facility], [facility] , [facility], [place] lab reports, imaging reports and provider notes as indicated.      HISTORY OF PRESENT ILLNESS  Galdino was diagnosed with diabetes in 2019. At the time of diagnosis, he had been experiencing symptoms of dehydration. Laboratory testing found him to be hyperglycemic with a hemoglobin A1C of 10.7%. He was started on metformin and insulin. He did have the presence MAY antibodies with an elevated  c-peptide. He was unable to continue with metformin due to upset stomach but remained on MDI therapy. He has a strong family history of diabetes with multiple family members using the Medtronic insulin pump. He transitioned to the Medtronic insulin pump in January of 2022.    Galdino feels like insulin pump therapy helped with his overall control. However, he did not like the tubing. He felt like it was more of a hassle, so in October of 2023, he switched to the Omnipod 5 insulin pump. He likes the Omnipod but he does not feel like it has controlled his blood sugars as well as the Medtronic. He feels like he needs to have the settings adjusted. He reports that his settings have not been changed since he started on the pump. His starting settings were as follows:    Time Basal Rate Carb Ratio  Sensitivity Target BG   0000 1 unit(s)/hr 1u:16g 58 mg/dl 100-120 mg/dl   0400 1 unit(s)/hr 1u:12g 58 mg/dl 100-120 mg/dl   0500 0.8 unit(s)/hr 1u:12g 58 mg/dl 100-120 mg/dl   0600 0.8 unit(s)/hr 1u:12g 58 mg/dl  mg/dl   1800 0.8 unit(s)/hr 1u:11g 58 mg/dl  mg/dl   2200 0.8 unit(s)/hr 1u:11g 58 mg/dl 100-120 mg/dl     Galdino uses the Dexcom G7 sensor to monitor his blood sugars closely. He has a history of two hospitalizations for DKA, with the most recent one being in December of 2024. At that time, he doesn't think the insulin was working or he may have had a clogged infusion site. He is not sure. His blood sugars increased to 500 mg/dl and would not come down, despite administering more insulin. Since that time, he has been trying to be more consistent with glucose control. He does experience a lot of glucose fluctuation and it is not uncommon for his blood sugars to fluctuate from 80 mg/dl up to 400 mg/dl, within a day.    Galdino has no history of diabetic retinopathy and he has not noticed any significant changes in his vision. He also has no history of numbness/tingling in his feet. Overall, he is feeling good. He  knows that he needs to get his blood sugars under better control, but he is doing okay. His diabetes is complicated by hyperlipidemia, history of paroxysmal a-fib, CAD, COPD, and HTN.    Endocrine relevant labs are as follows:   Latest Reference Range & Units 01/03/25 11:06   Hemoglobin A1C 0.0 - 5.6 % 9.4 (H)   (H): Data is abnormally high  Component  Ref Range & Units 3 mo ago   HEMOGLOBIN A1C MONITORING  <=6.4 % 9.5 High    Resulting Agency [facility]   Narrative  Performed by [facility]  (<=6.9%)           Indicates good control  (7.0% to 7.9%)     Indicates fair control  (>=8.0%)           Indicates poor control       NOTE:  These thresholds are guidelines and            individual targets may vary.  Specimen Collected: 12/30/24  3:22 PM    Performed by: [facility] Last Resulted: 12/30/24 10:55 PM     Component  Ref Range & Units 9 mo ago   ALB RAND URINE  mg/L 15.6   CREATININE,URINE  g/L 1.80   ALBUMIN TO CREATININE RATIO,RAND UR  <30.0 mg/g creat 8.7   Resulting Agency [facility]   Narrative  [facility]  If Albumin to Creatinine Ratio is elevated, consider the following:       Elevations seen with incipient nephropathy associated     with diabetes mellitus or hypertension. Stress, exercise,hematuria,     and urinary tract infection may also produce elevated results.    If clinically indicated, confirm with     24 Hour Albumin to Creatinine Ratio.     Specimen Collected: 06/18/24  3:17 PM    Performed by: [facility] Last Resulted: 06/19/24 12:22 AM     Component  Ref Range & Units 5 yr ago Comments   GAD65 Ab Assay  <=0.02 nmol/L 3.09 High  The following antibody was identified: Glutamic Acid  Decarboxylase. * This profile is consistent with  neurological autoimmunity and predisposition to  thyrogastric disorders, including thyroiditis, pernicious  anemia and type 1 diabetes.   GAD65 autoimmunity may be  associated with multifocal neurological manifestations in  the appropriate clinical context. *    -------------------ADDITIONAL INFORMATION-------------------  This test was developed and its performance characteristics  determined by [facility] in a manner consistent with CLIA  requirements. This test has not been cleared or approved by  the U.S. Food and Drug Administration.    Test Performed by:  [facility] [place] [address]  : Kailash Tom M.D. Ph.D.; CLIA# [id number]   Resulting Agency [facility]    Specimen Collected: 09/29/19  5:49 AM    Performed by: [facility] Last Resulted: 10/03/19 10:07 PM     REVIEW OF SYSTEMS    Endocrine: positive for diabetes  Skin: negative  Eyes: negative for, visual blurring, redness, tearing  Ears/Nose/Throat: negative  Respiratory: No shortness of breath, dyspnea on exertion, cough, or hemoptysis  Cardiovascular: negative for, chest pain, lower extremity edema, and exercise intolerance  Gastrointestinal: negative for, nausea, vomiting, constipation, and diarrhea  Genitourinary: negative for, nocturia, dysuria, frequency, and urgency  Musculoskeletal: negative for, muscular weakness, nocturnal cramping, and foot pain  Neurologic: negative for, local weakness, numbness or tingling of hands, and numbness or tingling of feet  Psychiatric: negative  Hematologic/Lymphatic/Immunologic: negative    Past Medical History  Past Medical History:   Diagnosis Date     Chronic obstructive pulmonary disease (H) 11/18/2011     Diabetes mellitus type 1 (H)        Medications  Current Outpatient Medications   Medication Sig Dispense Refill     atorvastatin (LIPITOR) 40 MG tablet Take 1 tablet (40 mg) by mouth daily. 90 tablet 3     blood glucose (ACCU-CHEK GUIDE) test strip 1 strip by In Vitro route 3 times daily       Continuous Glucose Sensor (DEXCOM G7 SENSOR)  MISC Change every 10 days. 9 each 1     Insulin Disposable Pump (OMNIPOD 5 G7 PODS, GEN 5,) MISC 1 each every 3 days. 30 each 1     metoprolol succinate ER (TOPROL XL) 25 MG 24 hr tablet Take 1 tablet (25 mg) by mouth daily at 2 pm. 90 tablet 0     nitroGLYcerin (NITROSTAT) 0.4 MG sublingual tablet Place 0.4 mg under the tongue every 5 minutes as needed for chest pain       NOVOLOG RELION 100 UNIT/ML vial Inject 120 Units subcutaneously every 3 days In omnipod disposable pump       PARoxetine (PAXIL) 30 MG tablet Take 1 tablet (30 mg) by mouth daily. 90 tablet 0     albuterol (PROAIR HFA/PROVENTIL HFA/VENTOLIN HFA) 108 (90 Base) MCG/ACT inhaler Inhale 2 puffs into the lungs every 6 hours as needed for shortness of breath, wheezing or cough (Patient not taking: Reported on 1/3/2025) 18 g 0     Continuous Glucose Sensor (DEXCOM G6 SENSOR) MISC Change every 10 days (Patient not taking: Reported on 4/2/2025)       Continuous Glucose Transmitter (DEXCOM G6 TRANSMITTER) MISC Use with sensor (Patient not taking: Reported on 4/2/2025)       traZODone (DESYREL) 50 MG tablet Take 1-2 tablets ( mg) by mouth nightly as needed for sleep. (Patient not taking: Reported on 4/2/2025) 30 tablet 1       Allergies  No Known Allergies    Family History  family history is not on file.    Social History  Social History     Tobacco Use     Smoking status: Every Day     Current packs/day: 1.00     Types: Cigarettes     Smokeless tobacco: Never   Vaping Use     Vaping status: Never Used   Substance Use Topics     Alcohol use: Not Currently     Drug use: Not Currently       Physical Exam  Body mass index is 25.83 kg/m .  GENERAL: no distress  SKIN: Visible skin clear. No significant rash, abnormal pigmentation or lesions.  EYES: Eyes grossly normal to inspection.  No discharge or erythema, or obvious scleral/conjunctival abnormalities.  NECK: visible goiter is not present; no visible neck masses  RESP: No audible wheeze, cough, or  visible cyanosis.  No visible retractions or increased work of breathing.    NEURO: Awake, alert, responds appropriately to questions.  Mentation and speech fluent.  PSYCH:affect normal and appearance well-groomed.      DATA REVIEW  He is unable to upload CGMS data        Again, thank you for allowing me to participate in the care of your patient.        Sincerely,        Candice Sanabria PA-C    Electronically signed

## 2025-04-02 NOTE — TELEPHONE ENCOUNTER
Rn did call and speak with patient. Patient states he has been taking it. But he does not like that he has to take it so does push it out some here and there. Sometimes will go 2 or 3 days without taking it.  But he also knows he needs to take it. This is why there is a gap in the time between refills.

## 2025-04-02 NOTE — TELEPHONE ENCOUNTER
Clinic RN: Please investigate patient's chart or contact patient if the information cannot be found because patient should have run out of this medication on 12/8/24. Confirm patient is taking this medication as prescribed. Document findings and route refill encounter to provider for approval or denial.    Aidee Duffy, ARACELISN, RN

## 2025-04-02 NOTE — NURSING NOTE
Current patient location: [address] [place] [address] [address]  [place] [address]    Is the patient currently in the state of [place]? YES    Visit mode: VIDEO    If the visit is dropped, the patient can be reconnected by:VIDEO VISIT: Text to cell phone:   Telephone Information:   Mobile [phone]       Will anyone else be joining the visit? NO  (If patient encounters technical issues they should call [phone]    Are changes needed to the allergy or medication list? No    Are refills needed on medications prescribed by this physician? NO    Rooming Documentation:  Questionnaire(s) completed    Reason for visit: Consult    Catalina TORRES

## 2025-04-03 LAB
C PEPTIDE SERPL-MCNC: <0.1 NG/ML (ref 0.9–6.9)
FASTING STATUS PATIENT QL REPORTED: YES

## 2025-04-13 ENCOUNTER — HEALTH MAINTENANCE LETTER (OUTPATIENT)
Age: 65
End: 2025-04-13

## 2025-04-15 ENCOUNTER — ALLIED HEALTH/NURSE VISIT (OUTPATIENT)
Dept: EDUCATION SERVICES | Facility: CLINIC | Age: 65
End: 2025-04-15
Attending: PHYSICIAN ASSISTANT
Payer: COMMERCIAL

## 2025-04-15 DIAGNOSIS — E10.65 UNCONTROLLED TYPE 1 DIABETES MELLITUS WITH HYPERGLYCEMIA (H): ICD-10-CM

## 2025-04-15 DIAGNOSIS — E13.9 LADA (LATENT AUTOIMMUNE DIABETES IN ADULTS), MANAGED AS TYPE 1 (H): ICD-10-CM

## 2025-04-15 PROCEDURE — G0108 DIAB MANAGE TRN  PER INDIV: HCPCS | Performed by: PHARMACIST

## 2025-04-15 RX ORDER — ACYCLOVIR 400 MG/1
TABLET ORAL
Qty: 9 EACH | Refills: 3 | Status: SHIPPED | OUTPATIENT
Start: 2025-04-15

## 2025-04-15 RX ORDER — INSULIN PMP CART,AUT,G6/7,CNTR
1 EACH SUBCUTANEOUS
Qty: 30 EACH | Refills: 5 | Status: SHIPPED | OUTPATIENT
Start: 2025-04-15

## 2025-04-15 NOTE — LETTER
"    4/15/2025         RE: Galdino Nelson  [address]  [place] [address]        Dear Colleague,    Thank you for referring your patient, Galdino Nelson, to the [facility] DIABETES EDUCATION [place]. Please see a copy of my visit note below.    Diabetes Self-Management Education & Support    Presents for: Insulin Pump and CGM Review for DM1    Type of Service: In Person Visit      Assessment    Insulin Pump Information  Insulin Pump Brand: OmniPod  Does patient have an insulin multiple daily injection back-up plan?: Yes    Reports                  CGM summary, last 2 weeks:  Avg  mg/dL  GMI NA%  Time in Range (TIR)  mg/dL is 40%  0% low under 70 mg/dL (0% less than 54)  38% high 181-250 mg/dL  22% very high over 250 mg/dL  Glucose trends show: highs after meals, sub optimal fasting control.  Daily sensor glucose data shows: not enough time in automation, communication issues, needs more with boluses.    Omnipod Insulin Pump Statistics:  Total Daily Dose (TDD): 32.6 units, with 35% bolus and 65% basal  Bolus overrides per day: 0% or 0 boluses  Automated mode basal of 21.2 units vs manual mode basal total of 20.2 units  Average number of carbs entered daily: 118 grams   Site changes every 3 days  Time spent in automated mode 64 %  Time CGM active 58.9% (at least in part due to cost)    Linked up Omnipod to Glooko.  Review of Glooko report shows connection issues.  He is not wearing his CGM and pods correctly and they are not \"seeing each other\".  This was discussed and will be corrected.  ICR and ISF decreased for more insulin (ICR 12am 16 to 14, 4am 12 to 10 and 6pm 11 to 10.  ISF 58 to 55).  Told to focus on carb counting and getting bolus in 10-15 min before eating whenever possible.  Correct when high. Stay in automation. Reviewed back up plan (provided him with a bag of syringes as he has no pens at home) and high BS protocol.  Sent rx's for pods/sensors to [facility] as I think " he is not getting them billed correctly being dually covered.  Follow-up scheduled in 2 weeks.      Care Plan and Education Provided:  Pump Hardware & Software:   -- Automated insulin delivery functions/features  -- Pump Alerts & Alarms   Bolusing:  -- Importance of accurate carbohydrate counting  -- Importance of blousing before meals  Problem Solving:  -- DKA prevention & steps to take when glucose is above 240 mg/dL   -- Multiple Daily injection back-up plan in case of pump failure    Patient verbalized understanding of diabetes self-management education concepts discussed, opportunities for ongoing education and support, and recommendations provided today.    Plan    Patient Instructions   These are for glooko and podder central:  Omnipod ID: [email or web address]  Glooko ID: [email or web address]  Password: [email or web address]    Keep a clear line of site between your pod and your sensor.  2.  Stay in automation all the time.  3.   Bolus for your carbs 10-15 minutes before eating.  4.   More insulin given with carbs and corrections today.  5.  I am sending prescriptions for pods and sensors to [facility] as I think the cost will be better.    Amina Chambers, PharmD, [facility], [facility] and [place] [facility]    [facility] for the [facility] Area:  For Your Diabetes Education and Nutrition Appointments Call:  [phone]   For Diabetes Education or Nutrition Related Questions:   Phone: [phone]  Send Motorator Message   If you need a medication refill please contact your pharmacy. Please allow 3 business days for your refills to be completed.      Omnipod insulin pump settings, including changes made today:    Basal rates (units/hour):  12am   1   5am    0.8     Insulin to carb ratios (grams/unit):  12am    14   4am    10  6pm     10     Insulin sensitivity/correction (mg/dL/unit):  12am    55    Active insulin time: 3 hours    Blood  glucose target range/correction threshold:  12am    110 - 110    Insulin Pump Back Up Plan:    If your insulin pump fails or has an issue, the first thing you should do is call the pump company and have them troubleshoot.   *[employer] 24-hour Customer Support [phone]   *[employer]andem 24-hour Customer Support [phone]   *[employer] (Omnipod) 24-hour Customer Support [phone]   *[employer] (ilet) 24-hour Customer Support [phone]    If they determine you need a new pump, you will have to go back to using insulin injections until your new pump arrives.  You will need either insulin pen(s) and pen needles and/or insulin syringes to do injections if your pump is not working.  You can purchase pen needles and insulin syringes at any pharmacy without a prescription.  Just ask at the pharmacy counter.    With your long acting insulin, take 20 units once daily   -these insulins could also be called Lantus, Basaglar, Semglee, Rezvoglar (insulin glargine), Levemir (insulin detemir), Toujeo (insulin glargine U300), Tresiba (insulin degludec).  -If you do not have a current long acting prescription and you are unable to reach your doctor to have one sent to the pharmacy, then you can buy a similar long acting insulin called NPH over the counter with no prescription needed at around $25 a vial from any pharmacy.  If you choose this option, you need to take 1/2 the dose above every 12 hours.  Your dose would be 10 units twice a day.    With your rapid acting insulin Novolog, take 1 unit for every 10 grams of carbohydrate and to correct high blood sugars, take 1 unit for every 55 over 150.  -these insulins could be called Novolog, Merilog, Fiasp (insulin aspart), Humalog, Admelog, Lyumjev (insulin lispro U100 or U200), or Apidra (insulin glulisine).    If you don't have long acting, you must take corrections every 3 -4 hours with your rapid acting insulin to keep your blood sugars under control.  This includes  overnight.      **If you have any questions regarding this information or need clarification, please call Candice Sanabria's office.      High Blood Sugar (>240 mg/dl) Protocol for Pump Users:  If a single blood sugar reading is above 240 mg/dl, follow these steps:   1. Immediately take a correction bolus with the pump using your bolus wizard (like normal).    2. Recheck your blood sugar in 1 hour.  If your blood sugar has gone down, your pump is working properly and continue to monitor your blood sugars as usual. If your blood sugar is not coming down in an hour, follow these steps closely and entirely:    A. You need to take an injection of rapid acting insulin using an insulin syringe or insulin pen (not using your pump).  Give this dose 2 to 3 hours to work.  Do not give any correction doses with your pump until this time is up or you will stack insulin and likely have a low blood sugar later.    B. Change out your entire infusion set, tubing and reservoir, being sure to use a fresh unopened, unused vial of insulin if you have it.  Changing your site is a MUST!  Do it right away.  IF IN DOUBT, CHANGE IT OUT!!    C. Check your blood or urine for ketones.  If you have moderate or large urine ketones or if your blood ketones are over 0.6 mmol/L- Call your provider!!  You will need to take additional insulin and they can provide instruction.  Do not wait for them to get back to you before doing the following steps: Drink at least a cup of water every hour and limit physical activity.  1.  Recheck your blood glucose and ketones in 60 minutes.  If your blood sugar is still not coming down and ketones are still present- REPORT TO YOUR NEAREST EMERGENCY ROOM!      **If at any time, you have symptoms of extreme headache, severe nausea, any vomiting or trouble breathing in conjunction with elevated blood sugars- immediately report to your nearest emergency room!      Follow-up:  Upcoming Diabetes Ed Appointments     Visit Type  Date Time Department    DIABETES ED 4/15/2025  8:00 AM  DIABETES EDUCATION    INSULIN PUMP FOLLOW-UP 2025 11:00 AM  DIABETES EDUCATION        See Care Plan for co-developed, patient-state behavior change goals.    Education Materials Provided:  No new materials provided today      Subjective/Objective  Galdino is an 65 year old year old, presenting for the following diabetes education related to: Presents for: Insulin Pump and CGM Review  Accompanied by: Self  Diabetes education in the past 24mo: Yes  Focus of Visit: Insulin Pump  Diabetes type: Type 1  Date of diagnosis: yobany JOHNSON ?  Disease course: Getting harder to manage  How confident are you filling out medical forms by yourself:: Extremely  Transportation concerns: No  Difficulty affording diabetes medication?: Yes  Difficulty affording diabetes testing supplies?: No  Other concerns:: Glasses  Cultural Influences/Ethnic Background:  Not  or     Galdino Nelson is seen for diabetes education follow up.  He was last seen by my partner Cary in November.  He has since seen aCndice Sanabria in endocrinology and needs me to assist with getting his Omnipod data linked.    Diabetes Medication(s)       Insulin       NOVOLOG RELION 100 UNIT/ML vial Inj 50 unit(s) subcutaneous daily via the omnipod disposable pump          Cost is an issue for supplies/meds.  Everything being sent to local [facility].  He has medicare/[employer] supplement primary and [place] Medicaid secondary.    Galdino has been trying to get his omnipod data linked to us.  He is not sure why we cannot see data.  He is frustrated because he wants help and nobody has been able to look at his data for a long time.    He was originally started on the Omnipod by a provider at health [facility] but due to insurance, he had to change to [facility].    Was hospitalized in January with DKA and BG over 1000.  Should have  he reports.       Diabetes Symptoms & Complications:  Diabetes Related Symptoms:  "Polydipsia (increased thirst), Polyuria (increased urination)  Weight trend: Stable  Symptom course: Stable  Disease course: Getting harder to manage  Complications assessed today?: No    Patient Problem List and Family Medical History reviewed for relevant medical history, current medical status, and diabetes risk factors.    Vitals:  There were no vitals taken for this visit.  Estimated body mass index is 25.83 kg/m  as calculated from the following:    Height as of 4/2/25: 1.778 m (5' 10\").    Weight as of 4/2/25: 81.6 kg (180 lb).   Last 3 BP:   BP Readings from Last 3 Encounters:   01/03/25 (!) 142/92   11/14/24 (!) 149/103   10/24/24 (!) 155/95       History   Smoking Status     Every Day     Types: Cigarettes   Smokeless Tobacco     Never       Labs:  Lab Results   Component Value Date    A1C 9.0 04/02/2025     Lab Results   Component Value Date     04/02/2025     11/14/2024     06/14/2022    GLC 92 05/10/2016     Lab Results   Component Value Date    LDL 93 04/02/2025     Direct Measure HDL   Date Value Ref Range Status   04/02/2025 66 >=40 mg/dL Final   ]  GFR Estimate   Date Value Ref Range Status   04/02/2025 >90 >60 mL/min/1.73m2 Final     Comment:     eGFR calculated using 2021 CKD-EPI equation.   05/10/2016 73 >60 mL/min/1.7m2 Final     Comment:     Non  GFR Calc     GFR Estimate If Black   Date Value Ref Range Status   05/10/2016 88 >60 mL/min/1.7m2 Final     Comment:      GFR Calc     Lab Results   Component Value Date    CR 0.93 04/02/2025    CR 1.05 05/10/2016     No results found for: \"MICROALBUMIN\"      Monitoring:  Blood Glucose Meter: Accu-chek, CGM  Times checking blood sugar at home (number): 5+  Times checking blood sugar at home (per): Day  Blood glucose trend: Increasing        Taking Medications:  Diabetes Medication(s)       Insulin       NOVOLOG RELION 100 UNIT/ML vial Inj 50 unit(s) subcutaneous daily via the omnipod disposable pump "          Taking Medication Assessed Today: Yes  Current Treatments: Insulin Pump  Problems taking diabetes medications regularly?: No  Diabetes medication side effects?: No    Problem Solving:  Problem Solving Assessed Today: Yes  Is the patient at risk for hypoglycemia?: Yes  Hypoglycemia Frequency: Weekly  Is the patient at risk for DKA?: Yes          Amina Chambers, PharmD, [facility], [facility] Diabetes Education    Time Spent: 70 minutes  Encounter Type: Individual    Any diabetes medication dose changes were made via the [facility] Standing Orders under the patient's referring provider.

## 2025-04-15 NOTE — PROGRESS NOTES
"Diabetes Self-Management Education & Support    Presents for: Insulin Pump and CGM Review for DM1    Type of Service: In Person Visit      Assessment    Insulin Pump Information  Insulin Pump Brand: OmniPod  Does patient have an insulin multiple daily injection back-up plan?: Yes    Reports                  CGM summary, last 2 weeks:  Avg  mg/dL  GMI NA%  Time in Range (TIR)  mg/dL is 40%  0% low under 70 mg/dL (0% less than 54)  38% high 181-250 mg/dL  22% very high over 250 mg/dL  Glucose trends show: highs after meals, sub optimal fasting control.  Daily sensor glucose data shows: not enough time in automation, communication issues, needs more with boluses.    Omnipod Insulin Pump Statistics:  Total Daily Dose (TDD): 32.6 units, with 35% bolus and 65% basal  Bolus overrides per day: 0% or 0 boluses  Automated mode basal of 21.2 units vs manual mode basal total of 20.2 units  Average number of carbs entered daily: 118 grams   Site changes every 3 days  Time spent in automated mode 64 %  Time CGM active 58.9% (at least in part due to cost)    Linked up Omnipod to Glooko.  Review of Glooko report shows connection issues.  He is not wearing his CGM and pods correctly and they are not \"seeing each other\".  This was discussed and will be corrected.  ICR and ISF decreased for more insulin (ICR 12am 16 to 14, 4am 12 to 10 and 6pm 11 to 10.  ISF 58 to 55).  Told to focus on carb counting and getting bolus in 10-15 min before eating whenever possible.  Correct when high. Stay in automation. Reviewed back up plan (provided him with a bag of syringes as he has no pens at home) and high BS protocol.  Sent rx's for pods/sensors to [facility] as I think he is not getting them billed correctly being dually covered.  Follow-up scheduled in 2 weeks.      Care Plan and Education Provided:  Pump Hardware & Software:   -- Automated insulin delivery functions/features  -- Pump Alerts & Alarms   Bolusing:  -- Importance " of accurate carbohydrate counting  -- Importance of blousing before meals  Problem Solving:  -- DKA prevention & steps to take when glucose is above 240 mg/dL   -- Multiple Daily injection back-up plan in case of pump failure    Patient verbalized understanding of diabetes self-management education concepts discussed, opportunities for ongoing education and support, and recommendations provided today.    Plan    Patient Instructions   These are for glooko and podder central:  Omnipod ID: [id number]  Glooko ID: [email or web address]  Password: [email or web address]    Keep a clear line of site between your pod and your sensor.  2.  Stay in automation all the time.  3.   Bolus for your carbs 10-15 minutes before eating.  4.   More insulin given with carbs and corrections today.  5.  I am sending prescriptions for pods and sensors to [facility] as I think the cost will be better.    Amina Chambers, PharmD, [facility], [facility] and [place] [facility]    [facility] for the [facility] Area:  For Your Diabetes Education and Nutrition Appointments Call:  [phone]   For Diabetes Education or Nutrition Related Questions:   Phone: [phone]  Send Care2Manage Message   If you need a medication refill please contact your pharmacy. Please allow 3 business days for your refills to be completed.      Omnipod insulin pump settings, including changes made today:    Basal rates (units/hour):  12am   1   5am    0.8     Insulin to carb ratios (grams/unit):  12am    14   4am    10  6pm     10     Insulin sensitivity/correction (mg/dL/unit):  12am    55    Active insulin time: 3 hours    Blood glucose target range/correction threshold:  12am    110 - 110    Insulin Pump Back Up Plan:    If your insulin pump fails or has an issue, the first thing you should do is call the pump company and have them troubleshoot.   *[employer] 24-hour Customer Support  [phone]   *[employer] 24-hour Customer Support [phone]   *[employer] (Omnipod) 24-hour Customer Support [phone]   *[employer] (ilet) 24-hour Customer Support [phone]    If they determine you need a new pump, you will have to go back to using insulin injections until your new pump arrives.  You will need either insulin pen(s) and pen needles and/or insulin syringes to do injections if your pump is not working.  You can purchase pen needles and insulin syringes at any pharmacy without a prescription.  Just ask at the pharmacy counter.    With your long acting insulin, take 20 units once daily   -these insulins could also be called Lantus, Basaglar, Semglee, Rezvoglar (insulin glargine), Levemir (insulin detemir), Toujeo (insulin glargine U300), Tresiba (insulin degludec).  -If you do not have a current long acting prescription and you are unable to reach your doctor to have one sent to the pharmacy, then you can buy a similar long acting insulin called NPH over the counter with no prescription needed at around $25 a vial from any pharmacy.  If you choose this option, you need to take 1/2 the dose above every 12 hours.  Your dose would be 10 units twice a day.    With your rapid acting insulin Novolog, take 1 unit for every 10 grams of carbohydrate and to correct high blood sugars, take 1 unit for every 55 over 150.  -these insulins could be called Novolog, Merilog, Fiasp (insulin aspart), Humalog, Admelog, Lyumjev (insulin lispro U100 or U200), or Apidra (insulin glulisine).    If you don't have long acting, you must take corrections every 3 -4 hours with your rapid acting insulin to keep your blood sugars under control.  This includes overnight.      **If you have any questions regarding this information or need clarification, please call Candice Sanabria's office.      High Blood Sugar (>240 mg/dl) Protocol for Pump Users:  If a single blood sugar reading is above 240 mg/dl, follow these steps:   1.  Immediately take a correction bolus with the pump using your bolus wizard (like normal).    2. Recheck your blood sugar in 1 hour.  If your blood sugar has gone down, your pump is working properly and continue to monitor your blood sugars as usual. If your blood sugar is not coming down in an hour, follow these steps closely and entirely:    A. You need to take an injection of rapid acting insulin using an insulin syringe or insulin pen (not using your pump).  Give this dose 2 to 3 hours to work.  Do not give any correction doses with your pump until this time is up or you will stack insulin and likely have a low blood sugar later.    B. Change out your entire infusion set, tubing and reservoir, being sure to use a fresh unopened, unused vial of insulin if you have it.  Changing your site is a MUST!  Do it right away.  IF IN DOUBT, CHANGE IT OUT!!    C. Check your blood or urine for ketones.  If you have moderate or large urine ketones or if your blood ketones are over 0.6 mmol/L- Call your provider!!  You will need to take additional insulin and they can provide instruction.  Do not wait for them to get back to you before doing the following steps: Drink at least a cup of water every hour and limit physical activity.  1.  Recheck your blood glucose and ketones in 60 minutes.  If your blood sugar is still not coming down and ketones are still present- REPORT TO YOUR NEAREST EMERGENCY ROOM!      **If at any time, you have symptoms of extreme headache, severe nausea, any vomiting or trouble breathing in conjunction with elevated blood sugars- immediately report to your nearest emergency room!      Follow-up:  Upcoming Diabetes Ed Appointments     Visit Type Date Time Department    DIABETES ED 4/15/2025  8:00 AM  DIABETES EDUCATION    INSULIN PUMP FOLLOW-UP 4/29/2025 11:00 AM  DIABETES EDUCATION        See Care Plan for co-developed, patient-state behavior change goals.    Education Materials Provided:  No new  materials provided today      Subjective/Objective  Galdino is an 65 year old year old, presenting for the following diabetes education related to: Presents for: Insulin Pump and CGM Review  Accompanied by: Self  Diabetes education in the past 24mo: Yes  Focus of Visit: Insulin Pump  Diabetes type: Type 1  Date of diagnosis: yobany JOHNSON ?  Disease course: Getting harder to manage  How confident are you filling out medical forms by yourself:: Extremely  Transportation concerns: No  Difficulty affording diabetes medication?: Yes  Difficulty affording diabetes testing supplies?: No  Other concerns:: Glasses  Cultural Influences/Ethnic Background:  Not  or     Galdino Nelson is seen for diabetes education follow up.  He was last seen by my partner Cary in November.  He has since seen Candice Sanabria in endocrinology and needs me to assist with getting his Omnipod data linked.    Diabetes Medication(s)       Insulin       NOVOLOG RELION 100 UNIT/ML vial Inj 50 unit(s) subcutaneous daily via the omnipod disposable pump          Cost is an issue for supplies/meds.  Everything being sent to local [facility].  He has medicare/[employer] supplement primary and [place] Medicaid secondary.    Galdino has been trying to get his omnipod data linked to us.  He is not sure why we cannot see data.  He is frustrated because he wants help and nobody has been able to look at his data for a long time.    He was originally started on the Omnipod by a provider at health [facility] but due to insurance, he had to change to [facility].    Was hospitalized in January with DKA and BG over 1000.  Should have  he reports.       Diabetes Symptoms & Complications:  Diabetes Related Symptoms: Polydipsia (increased thirst), Polyuria (increased urination)  Weight trend: Stable  Symptom course: Stable  Disease course: Getting harder to manage  Complications assessed today?: No    Patient Problem List and Family Medical History reviewed for relevant medical  "history, current medical status, and diabetes risk factors.    Vitals:  There were no vitals taken for this visit.  Estimated body mass index is 25.83 kg/m  as calculated from the following:    Height as of 4/2/25: 1.778 m (5' 10\").    Weight as of 4/2/25: 81.6 kg (180 lb).   Last 3 BP:   BP Readings from Last 3 Encounters:   01/03/25 (!) 142/92   11/14/24 (!) 149/103   10/24/24 (!) 155/95       History   Smoking Status    Every Day    Types: Cigarettes   Smokeless Tobacco    Never       Labs:  Lab Results   Component Value Date    A1C 9.0 04/02/2025     Lab Results   Component Value Date     04/02/2025     11/14/2024     06/14/2022    GLC 92 05/10/2016     Lab Results   Component Value Date    LDL 93 04/02/2025     Direct Measure HDL   Date Value Ref Range Status   04/02/2025 66 >=40 mg/dL Final   ]  GFR Estimate   Date Value Ref Range Status   04/02/2025 >90 >60 mL/min/1.73m2 Final     Comment:     eGFR calculated using 2021 CKD-EPI equation.   05/10/2016 73 >60 mL/min/1.7m2 Final     Comment:     Non  GFR Calc     GFR Estimate If Black   Date Value Ref Range Status   05/10/2016 88 >60 mL/min/1.7m2 Final     Comment:      GFR Calc     Lab Results   Component Value Date    CR 0.93 04/02/2025    CR 1.05 05/10/2016     No results found for: \"MICROALBUMIN\"      Monitoring:  Blood Glucose Meter: Accu-chek, CGM  Times checking blood sugar at home (number): 5+  Times checking blood sugar at home (per): Day  Blood glucose trend: Increasing        Taking Medications:  Diabetes Medication(s)       Insulin       NOVOLOG RELION 100 UNIT/ML vial Inj 50 unit(s) subcutaneous daily via the omnipod disposable pump          Taking Medication Assessed Today: Yes  Current Treatments: Insulin Pump  Problems taking diabetes medications regularly?: No  Diabetes medication side effects?: No    Problem Solving:  Problem Solving Assessed Today: Yes  Is the patient at risk for " hypoglycemia?: Yes  Hypoglycemia Frequency: Weekly  Is the patient at risk for DKA?: Yes          Amina Chambers, PharmD, [facility], [facility] and [place] [facility]    Time Spent: 70 minutes  Encounter Type: Individual    Any diabetes medication dose changes were made via the [facility] Standing Orders under the patient's referring provider.

## 2025-04-15 NOTE — PATIENT INSTRUCTIONS
These are for glooko and podder central:  Omnipod ID: [id number]  Glooko ID: [email or web address]  Password: [email or web address]    Keep a clear line of site between your pod and your sensor.  2.  Stay in automation all the time.  3.   Bolus for your carbs 10-15 minutes before eating.  4.   More insulin given with carbs and corrections today.  5.  I am sending prescriptions for pods and sensors to [facility] as I think the cost will be better.    Amina Chambers, PharmD, [facility], [facility] and [place] [facility]    [place] [facility] Services for the [facility] Area:  For Your Diabetes Education and Nutrition Appointments Call:  [phone]   For Diabetes Education or Nutrition Related Questions:   Phone: [phone]  Send [employer] Message   If you need a medication refill please contact your pharmacy. Please allow 3 business days for your refills to be completed.      Omnipod insulin pump settings, including changes made today:    Basal rates (units/hour):  12am   1   5am    0.8     Insulin to carb ratios (grams/unit):  12am    14   4am    10  6pm     10     Insulin sensitivity/correction (mg/dL/unit):  12am    55    Active insulin time: 3 hours    Blood glucose target range/correction threshold:  12am    110 - 110    Insulin Pump Back Up Plan:    If your insulin pump fails or has an issue, the first thing you should do is call the pump company and have them troubleshoot.   *[employer] 24-hour Customer Support [phone]   *[employer] 24-hour Customer Support [phone]   *[employer] (Omnipod) 24-hour Customer Support [phone]   *[employer] (ilet) 24-hour Customer Support [phone]    If they determine you need a new pump, you will have to go back to using insulin injections until your new pump arrives.  You will need either insulin pen(s) and pen needles and/or insulin syringes to do injections if your pump is not working.  You can  purchase pen needles and insulin syringes at any pharmacy without a prescription.  Just ask at the pharmacy counter.    With your long acting insulin, take 20 units once daily   -these insulins could also be called Lantus, Basaglar, Semglee, Rezvoglar (insulin glargine), Levemir (insulin detemir), Toujeo (insulin glargine U300), Tresiba (insulin degludec).  -If you do not have a current long acting prescription and you are unable to reach your doctor to have one sent to the pharmacy, then you can buy a similar long acting insulin called NPH over the counter with no prescription needed at around $25 a vial from any pharmacy.  If you choose this option, you need to take 1/2 the dose above every 12 hours.  Your dose would be 10 units twice a day.    With your rapid acting insulin Novolog, take 1 unit for every 10 grams of carbohydrate and to correct high blood sugars, take 1 unit for every 55 over 150.  -these insulins could be called Novolog, Merilog, Fiasp (insulin aspart), Humalog, Admelog, Lyumjev (insulin lispro U100 or U200), or Apidra (insulin glulisine).    If you don't have long acting, you must take corrections every 3 -4 hours with your rapid acting insulin to keep your blood sugars under control.  This includes overnight.      **If you have any questions regarding this information or need clarification, please call Candice Sanabria's office.      High Blood Sugar (>240 mg/dl) Protocol for Pump Users:  If a single blood sugar reading is above 240 mg/dl, follow these steps:   1. Immediately take a correction bolus with the pump using your bolus wizard (like normal).    2. Recheck your blood sugar in 1 hour.  If your blood sugar has gone down, your pump is working properly and continue to monitor your blood sugars as usual. If your blood sugar is not coming down in an hour, follow these steps closely and entirely:    A. You need to take an injection of rapid acting insulin using an insulin syringe or insulin pen  (not using your pump).  Give this dose 2 to 3 hours to work.  Do not give any correction doses with your pump until this time is up or you will stack insulin and likely have a low blood sugar later.    B. Change out your entire infusion set, tubing and reservoir, being sure to use a fresh unopened, unused vial of insulin if you have it.  Changing your site is a MUST!  Do it right away.  IF IN DOUBT, CHANGE IT OUT!!    C. Check your blood or urine for ketones.  If you have moderate or large urine ketones or if your blood ketones are over 0.6 mmol/L- Call your provider!!  You will need to take additional insulin and they can provide instruction.  Do not wait for them to get back to you before doing the following steps: Drink at least a cup of water every hour and limit physical activity.  1.  Recheck your blood glucose and ketones in 60 minutes.  If your blood sugar is still not coming down and ketones are still present- REPORT TO YOUR NEAREST EMERGENCY ROOM!      **If at any time, you have symptoms of extreme headache, severe nausea, any vomiting or trouble breathing in conjunction with elevated blood sugars- immediately report to your nearest emergency room!

## 2025-04-28 NOTE — PROGRESS NOTES
CARDIOLOGY CLINIC INITIAL VISIT NOTE    Patient name: Galdino Nelson  Age: 65 year old  Sex: male  YOB: 1960  Primary Care Physician: JENISE WING      CHIEF COMPLAINT: Mild troponin elevation noted during hospitalization for diabetic ketoacidosis in December 2024.      HPI: Galdino Nelson is a 65 year old male with past medical history pertinent for type 1 diabetes mellitus (insulin pump), mild nonobstructive CAD (coronary CTA in 2024 demonstrated mild nonobstructive CAD; calcium score 188), hypertension, dyslipidemia, COPD, tobacco use, anxiety, and several other noncardiac comorbidities who is referred for mild troponin elevation noted during hospitalization for diabetic ketoacidosis in December 2024; [place].  He was seen by the inpatient cardiology service at that time and felt that the troponin elevation likely represented supply/demand mismatch and was not representative of plaque rupture or acute coronary syndrome.  Notably he has had a coronary CT angiogram relatively recently in January 2024 which demonstrated mild nonobstructive CAD with 25-49% LAD lesion and calcium score of 188 and negative CT FFR.  Echocardiogram in 2023 demonstrated normal EF and no significant valve disease.    He is doing well at this time; drives a commercial truck.  He denies chest pain, dyspnea, presyncope, syncope, lower extremity edema.  Cardiac medications include metoprolol succinate 25 mg daily and atorvastatin 40 mg daily.  Recent blood work from April 2025 reviewed.  Lipids well-controlled with LDL of 93 and HDL of 66.  He states he is frequently not taking atorvastatin.  Finds it difficult to remember to take it at night.  Normal potassium of 3.7 and creatinine of 0.93.  Hemoglobin is normal at 15.4 in January 2025.  Recent hemoglobin A1c in April 2025 was elevated at 9.0 reflecting suboptimal control of diabetes.      REVIEW OF SYSTEMS: A 10-point ROS was performed. Pertinent positives are  mentioned above in the HPI. Remainder of systems were reviewed and are negative.      PAST MEDICAL AND SURGICAL HISTORY:  I have reviewed this patient's past medical and surgical history.    Past Medical History:   Diagnosis Date    Chronic obstructive pulmonary disease (H) 11/18/2011    Diabetes mellitus type 1 (H)        Past Surgical History:   Procedure Laterality Date    COLONOSCOPY N/A 11/14/2024    Procedure: COLONOSCOPY, FLEXIBLE, WITH LESION REMOVAL USING SNARE;  Surgeon: Valentino Guerrero DO;  Location: [facility]    HERNIA REPAIR      ORTHOPEDIC SURGERY           FAMILY HISTORY:  I have reviewed this patient's family history.    Family History   Problem Relation Age of Onset    Diabetes Mother     Diabetes Brother            SOCIAL HISTORY:  I have reviewed this patient's social history.    Social History     Socioeconomic History    Marital status: Single   Tobacco Use    Smoking status: Every Day     Current packs/day: 1.00     Types: Cigarettes    Smokeless tobacco: Never   Vaping Use    Vaping status: Never Used   Substance and Sexual Activity    Alcohol use: Not Currently    Drug use: Not Currently    Sexual activity: Not Currently     Social Drivers of Health     Financial Resource Strain: Low Risk  (9/9/2024)    Financial Resource Strain     Within the past 12 months, have you or your family members you live with been unable to get utilities (heat, electricity) when it was really needed?: No   Food Insecurity: No Food Insecurity (12/30/2024)    Received from [facility]    Food Insecurity     Do you worry your food will run out before you are able to buy more?: 1   Transportation Needs: No Transportation Needs (12/30/2024)    Received from [facility]    Transportation Needs     Does lack of transportation keep you from medical appointments?: 1     Does lack of transportation keep you from work, meetings or getting things that you  need?: 1   Social Connections: Socially Integrated (12/30/2024)    Received from [facility]    Social Connections     Do you often feel lonely or isolated from those around you?: 0   Interpersonal Safety: Low Risk  (11/14/2024)    Interpersonal Safety     Do you feel physically and emotionally safe where you currently live?: Yes     Within the past 12 months, have you been hit, slapped, kicked or otherwise physically hurt by someone?: No     Within the past 12 months, have you been humiliated or emotionally abused in other ways by your partner or ex-partner?: No   Housing Stability: Low Risk  (12/30/2024)    Received from [facility]    Housing Stability     What is your housing situation today?: 1         CURRENT MEDICATIONS:  I have reviewed this patient's current medications.    Current Outpatient Medications   Medication Sig Dispense Refill    albuterol (PROAIR HFA/PROVENTIL HFA/VENTOLIN HFA) 108 (90 Base) MCG/ACT inhaler Inhale 2 puffs into the lungs every 6 hours as needed for shortness of breath, wheezing or cough (Patient not taking: Reported on 1/3/2025) 18 g 0    atorvastatin (LIPITOR) 40 MG tablet Take 1 tablet (40 mg) by mouth daily. 90 tablet 3    blood glucose (ACCU-CHEK GUIDE) test strip 1 strip by In Vitro route 3 times daily      Continuous Glucose Sensor (DEXCOM G7 SENSOR) MISC Change every 10 days. 9 each 3    Insulin Disposable Pump (OMNIPOD 5 G7 PODS, GEN 5,) MISC 1 each every 3 days. 30 each 5    metoprolol succinate ER (TOPROL XL) 25 MG 24 hr tablet Take 1 tablet (25 mg) by mouth daily at 2 pm. 90 tablet 0    nitroGLYcerin (NITROSTAT) 0.4 MG sublingual tablet Place 0.4 mg under the tongue every 5 minutes as needed for chest pain      NOVOLOG RELION 100 UNIT/ML vial Inj 50 unit(s) subcutaneous daily via the omnipod disposable pump 50 mL 3    PARoxetine (PAXIL) 30 MG tablet Take 1 tablet by mouth once daily 30 tablet 0  "        ALLERGIES/SENSITIVITIES:  I have reviewed this patient's allergies.     No Known Allergies      PHYSICAL EXAM: BP (!) 146/90 (BP Location: Right arm, Cuff Size: Adult Regular)   Pulse 72   Ht 1.778 m (5' 10\")   Wt 86.6 kg (191 lb)   SpO2 95%   BMI 27.41 kg/m        GENERAL APPEARANCE: No acute distress, awake, alert.    HEENT: No scleral icterus or conjuctival hemorrhage, mucous membranes are moist.    NECK: Supple without thyromegaly. No carotid bruits are present. Normal jugular venous pressure.    CVS: Regular rate and rhythm with normal S1 and S2, no S3 or S4 gallop is present.    LUNG: Decreased air entry bilaterally. Respiratory effort is normal.    GASTROINTESTINAL: Abdomen is soft and non-tender with normal bowel sounds.    EXTREMITIES: No clubbing or cyanosis. No edema. Normal and symmetric pulses.    PSYCHIATRIC: Normal affect.    NEUROLOGIC: Alert and appropriate. No obvious focal deficits.      LABS AND DIAGNOSTICS:    CBC:  Lab Results   Component Value Date    WBC 6.1 01/03/2025    WBC 8.4 05/10/2016    RBC 5.23 01/03/2025    RBC 5.34 05/10/2016    HGB 15.4 01/03/2025    HGB 15.9 05/10/2016    HCT 44.8 01/03/2025    HCT 46.0 05/10/2016    MCV 86 01/03/2025    MCV 86 05/10/2016    MCH 29.4 01/03/2025    MCH 29.8 05/10/2016    MCHC 34.4 01/03/2025    MCHC 34.6 05/10/2016    RDW 13.3 01/03/2025    RDW 13.4 05/10/2016     (L) 01/03/2025     05/10/2016       BMP:  Lab Results   Component Value Date     04/02/2025     05/10/2016    POTASSIUM 3.7 04/02/2025    POTASSIUM 3.7 06/14/2022    POTASSIUM 3.9 05/10/2016    CHLORIDE 99 04/02/2025    CHLORIDE 105 06/14/2022    CHLORIDE 109 05/10/2016    CO2 28 04/02/2025    CO2 29 06/14/2022    CO2 24 05/10/2016    ANIONGAP 10 04/02/2025    ANIONGAP 3 06/14/2022    ANIONGAP 6 05/10/2016     (H) 04/02/2025     (H) 11/14/2024     (H) 06/14/2022    GLC 92 05/10/2016    BUN 14.5 04/02/2025    BUN 27 06/14/2022    " "BUN 18 05/10/2016    CR 0.93 04/02/2025    CR 1.05 05/10/2016    GFRESTIMATED >90 04/02/2025    GFRESTIMATED 73 05/10/2016    GFRESTBLACK 88 05/10/2016    REGINALD 9.5 04/02/2025    REGINALD 9.3 05/10/2016       LIPIDS:  Lab Results   Component Value Date    CHOL 171 04/02/2025    HDL 66 04/02/2025    LDL 93 04/02/2025    TRIG 62 04/02/2025       LFT:  Lab Results   Component Value Date    PROTTOTAL 6.8 04/02/2025    ALBUMIN 4.5 04/02/2025    BILITOTAL 0.6 04/02/2025    ALKPHOS 95 04/02/2025    AST 17 04/02/2025    ALT 16 04/02/2025       TFT:  Lab Results   Component Value Date    TSH 1.03 04/02/2025    TSH 1.08 06/14/2022       HgbA1c:   Lab Results   Component Value Date    A1C 9.0 (H) 04/02/2025    A1C 9.4 (H) 01/03/2025       INR RESULTS:  No results found for: \"INR\"    ECG (personally reviewed):  4/30/2025: Normal sinus rhythm, left anterior fascicular block.      7/31/2024: Normal sinus rhythm at 65 bpm, left anterior fascicular block, incomplete right bundle branch block.    Coronary CTA 1/19/2024:  Total calcium score of 188 as noted above. This places the patient in the   71st percentile for age/gender matched subjects.   Coronary arteries mild (25-50%) non occlusive disease in the proximal-mid   LAD composed of focal calcified plaques. There is evidence of   soft/noncalcified plaque at the ostium of the LAD and also at the   bifurcation of D1, both of which appear nonobstructive (25-50%).   Hemodynamic significance of the LAD will be checked by fractional flow CT.   Addendum: Fractional flow CT confirms the LAD atherosclerotic disease is   not hemodynamically significant (FFR beyond D1 bifurcation is 0.88).   Mildly dilated aortic root and proximal ascending aorta, measuring 4.0 and   4.1 cm respectively.   Non cardiac findings will be reported by the radiologist.     Recommendation:     1. Aggressive risk factor modification for primary prevention ASCVD.     Echocardiogram 10/2/2023:  1. Left ventricular systolic " function is low normal. The visual ejection  fraction is 50-55%.  2. Septal bounc presence. No regional wall motion abnormalities noted.  3. The right ventricle is normal in structure, function and size.  4. No evidence for significant valvular pathology.        IMPRESSION AND PLAN: Galdino Nelson is a 65 year old male with past medical history pertinent for type 1 diabetes mellitus (insulin pump), mild nonobstructive CAD (coronary CTA in 2024 demonstrated mild nonobstructive CAD; calcium score 188), hypertension, dyslipidemia, COPD, tobacco use, anxiety, and several other noncardiac comorbidities who is referred for mild troponin elevation noted during hospitalization for diabetic ketoacidosis in December 2024; Renetta.     Mild troponin elevation during recent hospitalization in December 2024 (DKA): I concur with the opinion of the cardiology team at [facility] in December 2025 when he was admitted with DKA.  The mild cardiac troponin elevation likely represented supply/demand mismatch in the setting of DKA associated stress and was not representative of plaque rupture or ACS.  Relatively recent coronary CT angiogram in July 2024 demonstrated mild nonobstructive CAD (25-49% LAD lesion that was CT FFR negative).  He denies any current anginal symptomatology.  Ischemic testing is not currently recommended.  Recommend aggressive control of diabetes and cardiovascular risk factors.    Mild nonobstructive CAD: Relatively recent coronary CT angiogram in July 2024 demonstrated mild nonobstructive CAD (25-49% LAD lesion that was CT FFR negative).  Calcium score is elevated 188.  I would recommend aggressive control of cardiovascular risk factors in this diabetic gentleman.  Notably diabetes is poorly controlled with hemoglobin A1c of 9.0; defer management to his primary care provider.  Please see comments below regarding dyslipidemia.  Aspirin 81 mg daily is recommended given presence of coronary  calcification.    Dyslipidemia: He is currently treated with atorvastatin 40 mg daily.  LDL is 93.  Recommend target LDL of less than 70.  Compliance issues with atorvastatin; forgets to take it at night.  I recommended switching to rosuvastatin 20 mg daily which can be taken in the morning (longer acting medicine).  Recommend checking fasting lipids in 2 months and I will contact him with results.      Type 1 diabetes mellitus: Given insulin pump.  Recently hemoglobin A1c 9.0.  Defer management to his endocrinologist.    Ongoing tobacco use: Ongoing tobacco use.  Evidence of coronary calcification as well as COPD.  I recommended tobacco cessation; he thinks he will continue to smoke.        Thank you for the opportunity to participate in the care of this patient. Please do not hesitate to contact me with any questions or concerns.    CC: Vianey Downey MD  [address] [place] [address]    Today's clinic visit entailed:    40 minutes spent by me on the date of the encounter doing chart review, review of test results, interpretation of tests, patient visit, and documentation   Provider  Link to [facility] Help Grid     The level of medical decision making during this visit was of moderate complexity.      Hazel Reaves MD, FACC, FASE, FSCMR.

## 2025-04-29 ENCOUNTER — VIRTUAL VISIT (OUTPATIENT)
Dept: EDUCATION SERVICES | Facility: CLINIC | Age: 65
End: 2025-04-29
Payer: COMMERCIAL

## 2025-04-29 DIAGNOSIS — E10.65 UNCONTROLLED TYPE 1 DIABETES MELLITUS WITH HYPERGLYCEMIA (H): Primary | ICD-10-CM

## 2025-04-29 NOTE — LETTER
4/29/2025         RE: Galdino Nelson  [address]  [facility] [address]        Dear Colleague,    Thank you for referring your patient, Galdino Nelson, to the [facility] DIABETES [facility]. Please see a copy of my visit note below.    Diabetes Self-Management Education & Support    Presents for: Insulin Pump and CGM Review for DM1    Type of service:  Video Visit    If the video visit is dropped, the video visit invitation should be resent by: Text to cell phone: [phone]    Originating Location (pt. Location): Other in his truck at work in [place]  Distant Location (provider location): [facility] DIABET[facility]  Mode of Communication:  Video Conference via [employer]    Video Start Time:  1053am  Video End Time (time video stopped): 1107am    How would patient like to obtain AVS? MyChart      Assessment    Insulin Pump Information  Insulin Pump Brand: OmniPod  Does patient have an insulin multiple daily injection back-up plan?: Yes    CGM summary, last 2 weeks:  Avg  mg/dL  GMI NA but my estimation is 7.4%  Time in Range (TIR)  mg/dL is 59%  0% low under 70 mg/dL (3% less than 54)- this was a sensor error.  28% high 181-250 mg/dL  10% very high over 250 mg/dL  Glucose trends show: highs after meals, lunch and late evening.  Daily sensor glucose data shows: lows post meal when bolus late, most carbs are put in pump before eating.    Omnipod Insulin Pump Statistics:  Total Daily Dose (TDD): 34 units, with 36% bolus and 64% basal  Bolus overrides per day: 0% or 0 boluses  Automated mode basal of 21.6 units vs manual mode basal total of 20.2 units  Average number of carbs entered daily: 120.7 grams   Time spent in automated mode 84 %  Time CGM active 75.9%    TIR is improving, not up to almost 60%.  Goal is 70%.  No more communication issues with pods/CGM due to placement as we discussed.  Still going high after meals and corrections need to be even more  aggressive.  ICR at 4am and 6pm changed from 10 to 8.8 and ISF from 55 to 50.  Continue to work hard at getting boluses in before eating, if bolus late will have worse post meal lows.       Reports                      Care Plan and Education Provided:  Pump Hardware & Software:   -- Automated insulin delivery functions/features  -- Pump Alerts & Alarms   Bolusing:  -- How to use bolus calculator  -- Importance of bolusing before meals  Problem Solving:  -- Multiple Daily injection back-up plan in case of pump failure      Patient verbalized understanding of diabetes self-management education concepts discussed, opportunities for ongoing education and support, and recommendations provided today.    Plan    Patient Instructions   More insulin given with carbs and corrections.  Keep working hard at getting all carb boluses in 10 to 15 minutes before eating.  If you bolus late, you will go lower after the meal now that you are being given more insulin with your carbs.  Try to push corrections through the bolus calculator when you are high.  Make sure you are staying in automation.    Omnipod insulin pump settings, including changes made today:     Basal rates (units/hour):  12am   1   5am    0.8      Insulin to carb ratios (grams/unit):  12am    14   4am    8.8  6pm     8.8     Insulin sensitivity/correction (mg/dL/unit):  12am    50     Active insulin time: 3 hours     Blood glucose target range/correction threshold:  12am    110 - 110     Insulin Pump Back Up Plan:     If your insulin pump fails or has an issue, the first thing you should do is call the pump company and have them troubleshoot.              *Medtronic 24-hour Customer Support [phone]              *[employer]andem 24-hour Customer Support [phone]              *[employer] (Omnipod) 24-hour Customer Support [phone]              *[employer] (ilet) 24-hour Customer Support [phone]     If they determine you need a new pump, you will have  to go back to using insulin injections until your new pump arrives.  You will need either insulin pen(s) and pen needles and/or insulin syringes to do injections if your pump is not working.  You can purchase pen needles and insulin syringes at any pharmacy without a prescription.  Just ask at the pharmacy counter.     With your long acting insulin, take 20 units once daily   -these insulins could also be called Lantus, Basaglar, Semglee, Rezvoglar (insulin glargine), Levemir (insulin detemir), Toujeo (insulin glargine U300), Tresiba (insulin degludec).  -If you do not have a current long acting prescription and you are unable to reach your doctor to have one sent to the pharmacy, then you can buy a similar long acting insulin called NPH over the counter with no prescription needed at around $25 a vial from any pharmacy.  If you choose this option, you need to take 1/2 the dose above every 12 hours.  Your dose would be 10 units twice a day.     With your rapid acting insulin Novolog, take 1 unit for every 10 grams of carbohydrate and to correct high blood sugars, take 1 unit for every 55 over 150.  -these insulins could be called Novolog, Merilog, Fiasp (insulin aspart), Humalog, Admelog, Lyumjev (insulin lispro U100 or U200), or Apidra (insulin glulisine).     If you don't have long acting, you must take corrections every 3 -4 hours with your rapid acting insulin to keep your blood sugars under control.  This includes overnight.       **If you have any questions regarding this information or need clarification, please call Candice Sanabria's office.    Follow-up:  Upcoming Diabetes Ed Appointments     Visit Type Date Time Department    INSULIN PUMP FOLLOW-UP 4/29/2025 11:00 AM  DIABETES EDUCATION    INSULIN PUMP FOLLOW-UP 5/20/2025  8:00 AM  DIABETES EDUCATION        See Care Plan for co-developed, patient-state behavior change goals.    Education Materials Provided:  No new materials provided  "today      Subjective/Objective  Galdino is an 65 year old, presenting for the following diabetes education related to: Insulin Pump and CGM Review  Cultural Influences/Ethnic Background:  Not  or     Galdino Nelson is seen for diabetes education follow up.  Our last visit was on 4/15/25.  Assessment from that visit: Linked up Omnipod to [employer]. Review of [employer] report shows connection issues. He is not wearing his CGM and pods correctly and they are not \"seeing each other\". This was discussed and will be corrected. ICR and ISF decreased for more insulin (ICR 12am 16 to 14, 4am 12 to 10 and 6pm 11 to 10. ISF 58 to 55). Told to focus on carb counting and getting bolus in 10-15 min before eating whenever possible. Correct when high. Stay in automation. Reviewed back up plan (provided him with a bag of syringes as he has no pens at home) and high BS protocol. Sent rx's for pods/sensors to [facility] as I think he is not getting them billed correctly being dually covered. Follow-up scheduled in 2 weeks.     Diabetes Medication(s)       Insulin       NOVOLOG RELION 100 UNIT/ML vial Inj 50 unit(s) subcutaneous daily via the omnipod disposable pump          Galdino states things are going well.  No more communication issues with his pod/CGM.  Hasn't ordered more yet but will be soon and hopefully it will be cheaper for him.  He is trying to be better at bolusing before his meals. He has no questions on the pump failure back up plan we discussed at last visit.    He is having a possible pod issue today.  He recalls his SG being 295 at 930am (hadn't eaten) so he took a correction.  BG at 11am during our visit was still 277.  He has 3.3 units of insulin on board.  He did take another 0.05 unit correction during our visit but I told him he may need to change his pod if he doesn't start going down a little faster. He does not note any insulin leaking and his pod is adhered to his body. He is 6 blocks from home and " "can do that if needed.  He will watch it.      Diabetes Symptoms & Complications:  Diabetes Related Symptoms: Polyuria (increased urination)  Weight trend: Stable  Symptom course: Stable  Disease course: Getting harder to manage  Complications assessed today?: No    Patient Problem List and Family Medical History reviewed for relevant medical history, current medical status, and diabetes risk factors.    Vitals:  There were no vitals taken for this visit.  Estimated body mass index is 25.83 kg/m  as calculated from the following:    Height as of 4/2/25: 1.778 m (5' 10\").    Weight as of 4/2/25: 81.6 kg (180 lb).   Last 3 BP:   BP Readings from Last 3 Encounters:   01/03/25 (!) 142/92   11/14/24 (!) 149/103   10/24/24 (!) 155/95       History   Smoking Status    Every Day    Types: Cigarettes   Smokeless Tobacco    Never       Labs:  Lab Results   Component Value Date    A1C 9.0 04/02/2025     Lab Results   Component Value Date     04/02/2025     11/14/2024     06/14/2022    GLC 92 05/10/2016     Lab Results   Component Value Date    LDL 93 04/02/2025     Direct Measure HDL   Date Value Ref Range Status   04/02/2025 66 >=40 mg/dL Final   ]  GFR Estimate   Date Value Ref Range Status   04/02/2025 >90 >60 mL/min/1.73m2 Final     Comment:     eGFR calculated using 2021 CKD-EPI equation.   05/10/2016 73 >60 mL/min/1.7m2 Final     Comment:     Non  GFR Calc     GFR Estimate If Black   Date Value Ref Range Status   05/10/2016 88 >60 mL/min/1.7m2 Final     Comment:      GFR Calc     Lab Results   Component Value Date    CR 0.93 04/02/2025    CR 1.05 05/10/2016     Lab Results   Component Value Date    MICROL <12.0 04/02/2025    UMALCR  04/02/2025      Comment:      Unable to calculate, urine albumin and/or urine creatinine is outside detectable limits.  Microalbuminuria is defined as an albumin:creatinine ratio of 17 to 299 for males and 25 to 299 for females. A ratio of " albumin:creatinine of 300 or higher is indicative of overt proteinuria.  Due to biologic variability, positive results should be confirmed by a second, first-morning random or 24-hour timed urine specimen. If there is discrepancy, a third specimen is recommended. When 2 out of 3 results are in the microalbuminuria range, this is evidence for incipient nephropathy and warrants increased efforts at glucose control, blood pressure control, and institution of therapy with an angiotensin-converting-enzyme (ACE) inhibitor (if the patient can tolerate it).      UCRR 81.9 04/02/2025 4/29/2025   Monitoring   Monitoring Assessed Today Yes   Blood Glucose Meter Accu-chek;CGM   Times checking blood sugar at home (number) 5+   Times checking blood sugar at home (per) Day   Blood glucose trend Increasing       Diabetes Medication(s)       Insulin       NOVOLOG RELION 100 UNIT/ML vial Inj 50 unit(s) subcutaneous daily via the omnipod disposable pump              4/29/2025   Taking Medications   Taking Medication Assessed Today Yes   Current Treatments Insulin Pump   Problems taking diabetes medications regularly? No   Diabetes medication side effects? No         4/29/2025   Problem Solving   Problem Solving Assessed Today Yes   Is the patient at risk for hypoglycemia? Yes   Hypoglycemia Frequency Weekly   Is the patient at risk for DKA? Yes       Amina Chambers, PharmD, [facility] [place]note 2], [facility] and [place] [facility]    Time Spent: 14 minutes  Encounter Type: Individual    Any diabetes medication dose changes were made via the [facility] Standing Orders under the patient's referring provider.

## 2025-04-29 NOTE — PATIENT INSTRUCTIONS
More insulin given with carbs and corrections.  Keep working hard at getting all carb boluses in 10 to 15 minutes before eating.  If you bolus late, you will go lower after the meal now that you are being given more insulin with your carbs.  Try to push corrections through the bolus calculator when you are high.  Make sure you are staying in automation.    Omnipod insulin pump settings, including changes made today:     Basal rates (units/hour):  12am   1   5am    0.8      Insulin to carb ratios (grams/unit):  12am    14   4am    8.8  6pm     8.8     Insulin sensitivity/correction (mg/dL/unit):  12am    50     Active insulin time: 3 hours     Blood glucose target range/correction threshold:  12am    110 - 110     Insulin Pump Back Up Plan:     If your insulin pump fails or has an issue, the first thing you should do is call the pump company and have them troubleshoot.              *Medtronic 24-hour Customer Support [phone]              *[employer]andem 24-hour Customer Support [phone]              *[employer] (Omnipod) 24-hour Customer Support [phone]              *[employer] (ilet[employer] 24-hour Customer Support [phone]     If they determine you need a new pump, you will have to go back to using insulin injections until your new pump arrives.  You will need either insulin pen(s) and pen needles and/or insulin syringes to do injections if your pump is not working.  You can purchase pen needles and insulin syringes at any pharmacy without a prescription.  Just ask at the pharmacy counter.     With your long acting insulin, take 20 units once daily   -these insulins could also be called Lantus, Basaglar, Semglee, Rezvoglar (insulin glargine), Levemir (insulin detemir), Toujeo (insulin glargine U300), Tresiba (insulin degludec).  -If you do not have a current long acting prescription and you are unable to reach your doctor to have one sent to the pharmacy, then you can buy a similar long acting insulin  called NPH over the counter with no prescription needed at around $25 a vial from any pharmacy.  If you choose this option, you need to take 1/2 the dose above every 12 hours.  Your dose would be 10 units twice a day.     With your rapid acting insulin Novolog, take 1 unit for every 10 grams of carbohydrate and to correct high blood sugars, take 1 unit for every 55 over 150.  -these insulins could be called Novolog, Merilog, Fiasp (insulin aspart), Humalog, Admelog, Lyumjev (insulin lispro U100 or U200), or Apidra (insulin glulisine).     If you don't have long acting, you must take corrections every 3 -4 hours with your rapid acting insulin to keep your blood sugars under control.  This includes overnight.       **If you have any questions regarding this information or need clarification, please call Candice Sanabria's office.

## 2025-04-29 NOTE — Clinical Note
"    4/29/2025         RE: Galdino Nelson  [address]  [facility] [address]        Dear Colleague,    Thank you for referring your patient, Galdino Nelson, to the [facility] DIABETES EDUCATION [place]. Please see a copy of my visit note below.    Galdino Nelson is seen for diabetes education follow up.  Our last visit was on 4/15/25.  Assessment from that visit: Linked up Omnipod to [employer]. Review of [employer] report shows connection issues. He is not wearing his CGM and pods correctly and they are not \"seeing each other\". This was discussed and will be corrected. ICR and ISF decreased for more insulin (ICR 12am 16 to 14, 4am 12 to 10 and 6pm 11 to 10. ISF 58 to 55). Told to focus on carb counting and getting bolus in 10-15 min before eating whenever possible. Correct when high. Stay in automation. Reviewed back up plan (provided him with a bag of syringes as he has no pens at home) and high BS protocol. Sent rx's for pods/sensors to [facility] as I think he is not getting them billed correctly being dually covered. Follow-up scheduled in 2 weeks.     Diabetes Medication(s)       Insulin       NOVOLOG RELION 100 UNIT/ML vial Inj 50 unit(s) subcutaneous daily via the omnipod disposable pump          CGM summary, last 2 weeks:  Avg  mg/dL  GMI NA but my estimation is 7.4%  Time in Range (TIR)  mg/dL is 59%  0% low under 70 mg/dL (3% less than 54)  28% high 181-250 mg/dL  10% very high over 250 mg/dL  Glucose trends show: highs after meals, lunch and late evening.  Daily sensor glucose data shows: ***    Omnipod Insulin Pump Statistics:  Total Daily Dose (TDD): 34 units, with 36% bolus and 64% basal  Bolus overrides per day: 0% or 0 boluses  Automated mode basal of 21.6 units vs manual mode basal total of 20.2 units  Average number of carbs entered daily: 120.7 grams   Time spent in automated mode 84 %  Time CGM active 75.9%    ***    Omnipod insulin pump settings, including changes made " today:     Basal rates (units/hour):  12am   1   5am    0.8      Insulin to carb ratios (grams/unit):  12am    14   4am    10  6pm     10      Insulin sensitivity/correction (mg/dL/unit):  12am    55     Active insulin time: 3 hours     Blood glucose target range/correction threshold:  12am    110 - 110     Insulin Pump Back Up Plan:     If your insulin pump fails or has an issue, the first thing you should do is call the pump company and have them troubleshoot.              *[employer] 24-hour Customer Support [phone]              *[employer] 24-hour Customer Support [phone]              *[employer] (Omnipod) 24-hour Customer Support [phone]              *[employer] (ile[employer]) 24-hour Customer Support [phone]     If they determine you need a new pump, you will have to go back to using insulin injections until your new pump arrives.  You will need either insulin pen(s) and pen needles and/or insulin syringes to do injections if your pump is not working.  You can purchase pen needles and insulin syringes at any pharmacy without a prescription.  Just ask at the pharmacy counter.     With your long acting insulin, take 20 units once daily   -these insulins could also be called Lantus, Basaglar, Semglee, Rezvoglar (insulin glargine), Levemir (insulin detemir), Toujeo (insulin glargine U300), Tresiba (insulin degludec).  -If you do not have a current long acting prescription and you are unable to reach your doctor to have one sent to the pharmacy, then you can buy a similar long acting insulin called NPH over the counter with no prescription needed at around $25 a vial from any pharmacy.  If you choose this option, you need to take 1/2 the dose above every 12 hours.  Your dose would be 10 units twice a day.     With your rapid acting insulin Novolog, take 1 unit for every 10 grams of carbohydrate and to correct high blood sugars, take 1 unit for every 55 over 150.  -these insulins could be called  "Novolog, Merilog, Fiasp (insulin aspart), Humalog, Admelog, Lyumjev (insulin lispro U100 or U200), or Apidra (insulin glulisine).     If you don't have long acting, you must take corrections every 3 -4 hours with your rapid acting insulin to keep your blood sugars under control.  This includes overnight.       **If you have any questions regarding this information or need clarification, please call Candice Sanabria's office.    Galdino Nelson is seen for diabetes education follow up.  Our last visit was on 4/15/25.  Assessment from that visit: Linked up Omnipod to [employer]. Review of [employer] report shows connection issues. He is not wearing his CGM and pods correctly and they are not \"seeing each other\". This was discussed and will be corrected. ICR and ISF decreased for more insulin (ICR 12am 16 to 14, 4am 12 to 10 and 6pm 11 to 10. ISF 58 to 55). Told to focus on carb counting and getting bolus in 10-15 min before eating whenever possible. Correct when high. Stay in automation. Reviewed back up plan (provided him with a bag of syringes as he has no pens at home) and high BS protocol. Sent rx's for pods/sensors to [facility] as I think he is not getting them billed correctly being dually covered. Follow-up scheduled in 2 weeks.     Diabetes Medication(s)       Insulin       NOVOLOG RELION 100 UNIT/ML vial Inj 50 unit(s) subcutaneous daily via the omnipod disposable pump          Galdino states things are going well.  No more communication issues with his pod/CGM.  Hasn't ordered more yet but will be soon and hopefully it will be cheaper for him.  He is trying to be better at bolusing before his meals. He has no questions on the pump failure back up plan we discussed at last visit.    He is having a possible pod issue today.  He recalls his SG being 295 at 930am (hadn't eaten) so he took a correction.  BG at 11am during our visit was still 277.  He has 3.3 units of insulin on board.  He did take another 0.05 unit " correction during our visit but I told him he may need to change his pod if he doesn't start going down a little faster. He does not note any insulin leaking and his pod is adhered to his body. He is 6 blocks from home and can do that if needed.  He will watch it.      CGM summary, last 2 weeks:  Avg  mg/dL  GMI NA but my estimation is 7.4%  Time in Range (TIR)  mg/dL is 59%  0% low under 70 mg/dL (3% less than 54)- this was a sensor error.  28% high 181-250 mg/dL  10% very high over 250 mg/dL  Glucose trends show: highs after meals, lunch and late evening.  Daily sensor glucose data shows: lows post meal when bolus late, most carbs are put in pump before eating.    Omnipod Insulin Pump Statistics:  Total Daily Dose (TDD): 34 units, with 36% bolus and 64% basal  Bolus overrides per day: 0% or 0 boluses  Automated mode basal of 21.6 units vs manual mode basal total of 20.2 units  Average number of carbs entered daily: 120.7 grams   Time spent in automated mode 84 %  Time CGM active 75.9%    TIR is improving, not up to almost 60%.  Goal is 70%.  No more communication issues with pods/CGM due to placement as we discussed.  Still going high after meals and corrections need to be even more aggressive.  ICR at 4am and 6pm changed from 10 to 8.8 and ISF from 55 to 50.  Continue to work hard at getting boluses in before eating, if bolus late will have worse post meal lows.     ***    Omnipod insulin pump settings, including changes made today:     Basal rates (units/hour):  12am   1   5am    0.8      Insulin to carb ratios (grams/unit):  12am    14   4am    10  6pm     10      Insulin sensitivity/correction (mg/dL/unit):  12am    55     Active insulin time: 3 hours     Blood glucose target range/correction threshold:  12am    110 - 110     Insulin Pump Back Up Plan:     If your insulin pump fails or has an issue, the first thing you should do is call the pump company and have them troubleshoot.               *Medtr[employer] 24-hour Customer Support [phone]              *[employer] 24-hour Customer Support [phone]              *[employer] (Omnipod) 24-hour Customer Support [phone]              *[employer] 24-hour Customer Support [phone]     If they determine you need a new pump, you will have to go back to using insulin injections until your new pump arrives.  You will need either insulin pen(s) and pen needles and/or insulin syringes to do injections if your pump is not working.  You can purchase pen needles and insulin syringes at any pharmacy without a prescription.  Just ask at the pharmacy counter.     With your long acting insulin, take 20 units once daily   -these insulins could also be called Lantus, Basaglar, Semglee, Rezvoglar (insulin glargine), Levemir (insulin detemir), Toujeo (insulin glargine U300), Tresiba (insulin degludec).  -If you do not have a current long acting prescription and you are unable to reach your doctor to have one sent to the pharmacy, then you can buy a similar long acting insulin called NPH over the counter with no prescription needed at around $25 a vial from any pharmacy.  If you choose this option, you need to take 1/2 the dose above every 12 hours.  Your dose would be 10 units twice a day.     With your rapid acting insulin Novolog, take 1 unit for every 10 grams of carbohydrate and to correct high blood sugars, take 1 unit for every 55 over 150.  -these insulins could be called Novolog, Merilog, Fiasp (insulin aspart), Humalog, Admelog, Lyumjev (insulin lispro U100 or U200), or Apidra (insulin glulisine).     If you don't have long acting, you must take corrections every 3 -4 hours with your rapid acting insulin to keep your blood sugars under control.  This includes overnight.       **If you have any questions regarding this information or need clarification, please call Candice Sanabria's office.

## 2025-04-29 NOTE — PROGRESS NOTES
Diabetes Self-Management Education & Support    Presents for: Insulin Pump and CGM Review for DM1    Type of service:  Video Visit    If the video visit is dropped, the video visit invitation should be resent by: Text to cell phone: [phone]    Originating Location (pt. Location): Other in his truck at work in [place]  Distant Location (provider location):  [facility] [place]  Mode of Communication:  Video Conference via OneM[employer]    Video Start Time:  1053am  Video End Time (time video stopped): 1107am    How would patient like to obtain AVS? MyChart      Assessment    Insulin Pump Information  Insulin Pump Brand: OmniPod  Does patient have an insulin multiple daily injection back-up plan?: Yes    CGM summary, last 2 weeks:  Avg  mg/dL  GMI NA but my estimation is 7.4%  Time in Range (TIR)  mg/dL is 59%  0% low under 70 mg/dL (3% less than 54)- this was a sensor error.  28% high 181-250 mg/dL  10% very high over 250 mg/dL  Glucose trends show: highs after meals, lunch and late evening.  Daily sensor glucose data shows: lows post meal when bolus late, most carbs are put in pump before eating.    Omnipod Insulin Pump Statistics:  Total Daily Dose (TDD): 34 units, with 36% bolus and 64% basal  Bolus overrides per day: 0% or 0 boluses  Automated mode basal of 21.6 units vs manual mode basal total of 20.2 units  Average number of carbs entered daily: 120.7 grams   Time spent in automated mode 84 %  Time CGM active 75.9%    TIR is improving, not up to almost 60%.  Goal is 70%.  No more communication issues with pods/CGM due to placement as we discussed.  Still going high after meals and corrections need to be even more aggressive.  ICR at 4am and 6pm changed from 10 to 8.8 and ISF from 55 to 50.  Continue to work hard at getting boluses in before eating, if bolus late will have worse post meal lows.       Reports                      Care Plan and Education Provided:  Pump  Hardware & Software:   -- Automated insulin delivery functions/features  -- Pump Alerts & Alarms   Bolusing:  -- How to use bolus calculator  -- Importance of bolusing before meals  Problem Solving:  -- Multiple Daily injection back-up plan in case of pump failure      Patient verbalized understanding of diabetes self-management education concepts discussed, opportunities for ongoing education and support, and recommendations provided today.    Plan    Patient Instructions   More insulin given with carbs and corrections.  Keep working hard at getting all carb boluses in 10 to 15 minutes before eating.  If you bolus late, you will go lower after the meal now that you are being given more insulin with your carbs.  Try to push corrections through the bolus calculator when you are high.  Make sure you are staying in automation.    Omnipod insulin pump settings, including changes made today:     Basal rates (units/hour):  12am   1   5am    0.8      Insulin to carb ratios (grams/unit):  12am    14   4am    8.8  6pm     8.8     Insulin sensitivity/correction (mg/dL/unit):  12am    50     Active insulin time: 3 hours     Blood glucose target range/correction threshold:  12am    110 - 110     Insulin Pump Back Up Plan:     If your insulin pump fails or has an issue, the first thing you should do is call the pump company and have them troubleshoot.              *[employer] 24-hour Customer Support [phone]              *[employer] 24-hour Customer Support [phone]              *[employer] (Omnipod) 24-hour Customer Support [phone]              *[employer] (ile[employer]) 24-hour Customer Support [phone]     If they determine you need a new pump, you will have to go back to using insulin injections until your new pump arrives.  You will need either insulin pen(s) and pen needles and/or insulin syringes to do injections if your pump is not working.  You can purchase pen needles and insulin syringes at any pharmacy  without a prescription.  Just ask at the pharmacy counter.     With your long acting insulin, take 20 units once daily   -these insulins could also be called Lantus, Basaglar, Semglee, Rezvoglar (insulin glargine), Levemir (insulin detemir), Toujeo (insulin glargine U300), Tresiba (insulin degludec).  -If you do not have a current long acting prescription and you are unable to reach your doctor to have one sent to the pharmacy, then you can buy a similar long acting insulin called NPH over the counter with no prescription needed at around $25 a vial from any pharmacy.  If you choose this option, you need to take 1/2 the dose above every 12 hours.  Your dose would be 10 units twice a day.     With your rapid acting insulin Novolog, take 1 unit for every 10 grams of carbohydrate and to correct high blood sugars, take 1 unit for every 55 over 150.  -these insulins could be called Novolog, Merilog, Fiasp (insulin aspart), Humalog, Admelog, Lyumjev (insulin lispro U100 or U200), or Apidra (insulin glulisine).     If you don't have long acting, you must take corrections every 3 -4 hours with your rapid acting insulin to keep your blood sugars under control.  This includes overnight.       **If you have any questions regarding this information or need clarification, please call Candice Sanabria's office.    Follow-up:  Upcoming Diabetes Ed Appointments     Visit Type Date Time Department    INSULIN PUMP FOLLOW-UP 4/29/2025 11:00 AM  DIABETES EDUCATION    INSULIN PUMP FOLLOW-UP 5/20/2025  8:00 AM  DIABETES EDUCATION        See Care Plan for co-developed, patient-state behavior change goals.    Education Materials Provided:  No new materials provided today      Subjective/Objective  Galdino is an 65 year old, presenting for the following diabetes education related to: Insulin Pump and CGM Review  Cultural Influences/Ethnic Background:  Not  or     Galdino Nelson is seen for diabetes education follow up.  Our last  "visit was on 4/15/25.  Assessment from that visit: Linked up Omnipod to Lisa. Review of [employer] report shows connection issues. He is not wearing his CGM and pods correctly and they are not \"seeing each other\". This was discussed and will be corrected. ICR and ISF decreased for more insulin (ICR 12am 16 to 14, 4am 12 to 10 and 6pm 11 to 10. ISF 58 to 55). Told to focus on carb counting and getting bolus in 10-15 min before eating whenever possible. Correct when high. Stay in automation. Reviewed back up plan (provided him with a bag of syringes as he has no pens at home) and high BS protocol. Sent rx's for pods/sensors to [facility] as I think he is not getting them billed correctly being dually covered. Follow-up scheduled in 2 weeks.     Diabetes Medication(s)       Insulin       NOVOLOG RELION 100 UNIT/ML vial Inj 50 unit(s) subcutaneous daily via the omnipod disposable pump          Galdino states things are going well.  No more communication issues with his pod/CGM.  Hasn't ordered more yet but will be soon and hopefully it will be cheaper for him.  He is trying to be better at bolusing before his meals. He has no questions on the pump failure back up plan we discussed at last visit.    He is having a possible pod issue today.  He recalls his SG being 295 at 930am (hadn't eaten) so he took a correction.  BG at 11am during our visit was still 277.  He has 3.3 units of insulin on board.  He did take another 0.05 unit correction during our visit but I told him he may need to change his pod if he doesn't start going down a little faster. He does not note any insulin leaking and his pod is adhered to his body. He is 6 blocks from home and can do that if needed.  He will watch it.      Diabetes Symptoms & Complications:  Diabetes Related Symptoms: Polyuria (increased urination)  Weight trend: Stable  Symptom course: Stable  Disease course: Getting harder to manage  Complications assessed today?: No    Patient " "Problem List and Family Medical History reviewed for relevant medical history, current medical status, and diabetes risk factors.    Vitals:  There were no vitals taken for this visit.  Estimated body mass index is 25.83 kg/m  as calculated from the following:    Height as of 4/2/25: 1.778 m (5' 10\").    Weight as of 4/2/25: 81.6 kg (180 lb).   Last 3 BP:   BP Readings from Last 3 Encounters:   01/03/25 (!) 142/92   11/14/24 (!) 149/103   10/24/24 (!) 155/95       History   Smoking Status    Every Day    Types: Cigarettes   Smokeless Tobacco    Never       Labs:  Lab Results   Component Value Date    A1C 9.0 04/02/2025     Lab Results   Component Value Date     04/02/2025     11/14/2024     06/14/2022    GLC 92 05/10/2016     Lab Results   Component Value Date    LDL 93 04/02/2025     Direct Measure HDL   Date Value Ref Range Status   04/02/2025 66 >=40 mg/dL Final   ]  GFR Estimate   Date Value Ref Range Status   04/02/2025 >90 >60 mL/min/1.73m2 Final     Comment:     eGFR calculated using 2021 CKD-EPI equation.   05/10/2016 73 >60 mL/min/1.7m2 Final     Comment:     Non  GFR Calc     GFR Estimate If Black   Date Value Ref Range Status   05/10/2016 88 >60 mL/min/1.7m2 Final     Comment:      GFR Calc     Lab Results   Component Value Date    CR 0.93 04/02/2025    CR 1.05 05/10/2016     Lab Results   Component Value Date    MICROL <12.0 04/02/2025    UMALCR  04/02/2025      Comment:      Unable to calculate, urine albumin and/or urine creatinine is outside detectable limits.  Microalbuminuria is defined as an albumin:creatinine ratio of 17 to 299 for males and 25 to 299 for females. A ratio of albumin:creatinine of 300 or higher is indicative of overt proteinuria.  Due to biologic variability, positive results should be confirmed by a second, first-morning random or 24-hour timed urine specimen. If there is discrepancy, a third specimen is recommended. When 2 out " of 3 results are in the microalbuminuria range, this is evidence for incipient nephropathy and warrants increased efforts at glucose control, blood pressure control, and institution of therapy with an angiotensin-converting-enzyme (ACE) inhibitor (if the patient can tolerate it).      UCRR 81.9 04/02/2025 4/29/2025   Monitoring   Monitoring Assessed Today Yes   Blood Glucose Meter Accu-chek;CGM   Times checking blood sugar at home (number) 5+   Times checking blood sugar at home (per) Day   Blood glucose trend Increasing       Diabetes Medication(s)       Insulin       NOVOLOG RELION 100 UNIT/ML vial Inj 50 unit(s) subcutaneous daily via the omnipod disposable pump              4/29/2025   Taking Medications   Taking Medication Assessed Today Yes   Current Treatments Insulin Pump   Problems taking diabetes medications regularly? No   Diabetes medication side effects? No         4/29/2025   Problem Solving   Problem Solving Assessed Today Yes   Is the patient at risk for hypoglycemia? Yes   Hypoglycemia Frequency Weekly   Is the patient at risk for DKA? Yes       Amina Chambers, PharmD, [facility], [facility] and [place] Diabetes Education    Time Spent: 14 minutes  Encounter Type: Individual    Any diabetes medication dose changes were made via the [facility] Orders under the patient's referring provider.

## 2025-04-30 ENCOUNTER — OFFICE VISIT (OUTPATIENT)
Dept: CARDIOLOGY | Facility: CLINIC | Age: 65
End: 2025-04-30
Attending: FAMILY MEDICINE
Payer: COMMERCIAL

## 2025-04-30 VITALS
SYSTOLIC BLOOD PRESSURE: 146 MMHG | WEIGHT: 191 LBS | BODY MASS INDEX: 27.35 KG/M2 | HEART RATE: 72 BPM | HEIGHT: 70 IN | OXYGEN SATURATION: 95 % | DIASTOLIC BLOOD PRESSURE: 90 MMHG

## 2025-04-30 DIAGNOSIS — E78.2 MIXED HYPERLIPIDEMIA: ICD-10-CM

## 2025-04-30 DIAGNOSIS — I10 ESSENTIAL HYPERTENSION: ICD-10-CM

## 2025-04-30 DIAGNOSIS — I25.10 CAD IN NATIVE ARTERY: ICD-10-CM

## 2025-04-30 DIAGNOSIS — R79.89 ELEVATED TROPONIN: Primary | ICD-10-CM

## 2025-04-30 RX ORDER — ROSUVASTATIN CALCIUM 20 MG/1
20 TABLET, COATED ORAL DAILY
Qty: 90 TABLET | Refills: 3 | Status: SHIPPED | OUTPATIENT
Start: 2025-04-30

## 2025-04-30 RX ORDER — ASPIRIN 81 MG/1
81 TABLET ORAL DAILY
Qty: 90 TABLET | Refills: 3 | COMMUNITY
Start: 2025-04-30

## 2025-04-30 ASSESSMENT — PAIN SCALES - GENERAL: PAINLEVEL_OUTOF10: NO PAIN (0)

## 2025-04-30 NOTE — LETTER
4/30/2025    JENISE WING MD  [address] [facility] [address]    RE: Galdino Nelson       Dear Colleague,     I had the pleasure of seeing Galdino Nelson in the [facility].  CARDIOLOGY CLINIC INITIAL VISIT NOTE    Patient name: Galdino Nelson  Age: 65 year old  Sex: male  YOB: 1960  Primary Care Physician: JENISE WING      CHIEF COMPLAINT: Mild troponin elevation noted during hospitalization for diabetic ketoacidosis in December 2024.      HPI: Galdino Nelson is a 65 year old male with past medical history pertinent for type 1 diabetes mellitus (insulin pump), mild nonobstructive CAD (coronary CTA in 2024 demonstrated mild nonobstructive CAD; calcium score 188), hypertension, dyslipidemia, COPD, tobacco use, anxiety, and several other noncardiac comorbidities who is referred for mild troponin elevation noted during hospitalization for diabetic ketoacidosis in December 2024; [facility].  He was seen by the inpatient cardiology service at that time and felt that the troponin elevation likely represented supply/demand mismatch and was not representative of plaque rupture or acute coronary syndrome.  Notably he has had a coronary CT angiogram relatively recently in January 2024 which demonstrated mild nonobstructive CAD with 25-49% LAD lesion and calcium score of 188 and negative CT FFR.  Echocardiogram in 2023 demonstrated normal EF and no significant valve disease.    He is doing well at this time; drives a commercial truck.  He denies chest pain, dyspnea, presyncope, syncope, lower extremity edema.  Cardiac medications include metoprolol succinate 25 mg daily and atorvastatin 40 mg daily.  Recent blood work from April 2025 reviewed.  Lipids well-controlled with LDL of 93 and HDL of 66.  He states he is frequently not taking atorvastatin.  Finds it difficult to remember to take it at night.  Normal potassium of 3.7 and creatinine of 0.93.  Hemoglobin is normal  at 15.4 in January 2025.  Recent hemoglobin A1c in April 2025 was elevated at 9.0 reflecting suboptimal control of diabetes.      REVIEW OF SYSTEMS: A 10-point ROS was performed. Pertinent positives are mentioned above in the HPI. Remainder of systems were reviewed and are negative.      PAST MEDICAL AND SURGICAL HISTORY:  I have reviewed this patient's past medical and surgical history.    Past Medical History:   Diagnosis Date     Chronic obstructive pulmonary disease (H) 11/18/2011     Diabetes mellitus type 1 (H)        Past Surgical History:   Procedure Laterality Date     COLONOSCOPY N/A 11/14/2024    Procedure: COLONOSCOPY, FLEXIBLE, WITH LESION REMOVAL USING SNARE;  Surgeon: Valentino Guerrero DO;  Location:  GI     HERNIA REPAIR       ORTHOPEDIC SURGERY           FAMILY HISTORY:  I have reviewed this patient's family history.    Family History   Problem Relation Age of Onset     Diabetes Mother      Diabetes Brother            SOCIAL HISTORY:  I have reviewed this patient's social history.    Social History     Socioeconomic History     Marital status: Single   Tobacco Use     Smoking status: Every Day     Current packs/day: 1.00     Types: Cigarettes     Smokeless tobacco: Never   Vaping Use     Vaping status: Never Used   Substance and Sexual Activity     Alcohol use: Not Currently     Drug use: Not Currently     Sexual activity: Not Currently     Social Drivers of Health     Financial Resource Strain: Low Risk  (9/9/2024)    Financial Resource Strain      Within the past 12 months, have you or your family members you live with been unable to get utilities (heat, electricity) when it was really needed?: No   Food Insecurity: No Food Insecurity (12/30/2024)    Received from [facility]    Food Insecurity      Do you worry your food will run out before you are able to buy more?: 1   Transportation Needs: No Transportation Needs (12/30/2024)    Received from [facility]    Transportation Needs      Does lack of transportation keep you from medical appointments?: 1      Does lack of transportation keep you from work, meetings or getting things that you need?: 1   Social Connections: Socially Integrated (12/30/2024)    Received from [facility]    Social Connections      Do you often feel lonely or isolated from those around you?: 0   Interpersonal Safety: Low Risk  (11/14/2024)    Interpersonal Safety      Do you feel physically and emotionally safe where you currently live?: Yes      Within the past 12 months, have you been hit, slapped, kicked or otherwise physically hurt by someone?: No      Within the past 12 months, have you been humiliated or emotionally abused in other ways by your partner or ex-partner?: No   Housing Stability: Low Risk  (12/30/2024)    Received from [facility]    Housing Stability      What is your housing situation today?: 1         CURRENT MEDICATIONS:  I have reviewed this patient's current medications.    Current Outpatient Medications   Medication Sig Dispense Refill     albuterol (PROAIR HFA/PROVENTIL HFA/VENTOLIN HFA) 108 (90 Base) MCG/ACT inhaler Inhale 2 puffs into the lungs every 6 hours as needed for shortness of breath, wheezing or cough (Patient not taking: Reported on 1/3/2025) 18 g 0     atorvastatin (LIPITOR) 40 MG tablet Take 1 tablet (40 mg) by mouth daily. 90 tablet 3     blood glucose (ACCU-CHEK GUIDE) test strip 1 strip by In Vitro route 3 times daily       Continuous Glucose Sensor (DEXCOM G7 SENSOR) MISC Change every 10 days. 9 each 3     Insulin Disposable Pump (OMNIPOD 5 G7 PODS, GEN 5,) MISC 1 each every 3 days. 30 each 5     metoprolol succinate ER (TOPROL XL) 25 MG 24 hr tablet Take 1 tablet (25 mg) by mouth daily at 2 pm. 90 tablet 0     nitroGLYcerin (NITROSTAT) 0.4 MG sublingual tablet Place 0.4 mg under the tongue every 5  "minutes as needed for chest pain       NOVOLOG RELION 100 UNIT/ML vial Inj 50 unit(s) subcutaneous daily via the omnipod disposable pump 50 mL 3     PARoxetine (PAXIL) 30 MG tablet Take 1 tablet by mouth once daily 30 tablet 0         ALLERGIES/SENSITIVITIES:  I have reviewed this patient's allergies.     No Known Allergies      PHYSICAL EXAM: BP (!) 146/90 (BP Location: Right arm, Cuff Size: Adult Regular)   Pulse 72   Ht 1.778 m (5' 10\")   Wt 86.6 kg (191 lb)   SpO2 95%   BMI 27.41 kg/m        GENERAL APPEARANCE: No acute distress, awake, alert.    HEENT: No scleral icterus or conjuctival hemorrhage, mucous membranes are moist.    NECK: Supple without thyromegaly. No carotid bruits are present. Normal jugular venous pressure.    CVS: Regular rate and rhythm with normal S1 and S2, no S3 or S4 gallop is present.    LUNG: Decreased air entry bilaterally. Respiratory effort is normal.    GASTROINTESTINAL: Abdomen is soft and non-tender with normal bowel sounds.    EXTREMITIES: No clubbing or cyanosis. No edema. Normal and symmetric pulses.    PSYCHIATRIC: Normal affect.    NEUROLOGIC: Alert and appropriate. No obvious focal deficits.      LABS AND DIAGNOSTICS:    CBC:  Lab Results   Component Value Date    WBC 6.1 01/03/2025    WBC 8.4 05/10/2016    RBC 5.23 01/03/2025    RBC 5.34 05/10/2016    HGB 15.4 01/03/2025    HGB 15.9 05/10/2016    HCT 44.8 01/03/2025    HCT 46.0 05/10/2016    MCV 86 01/03/2025    MCV 86 05/10/2016    MCH 29.4 01/03/2025    MCH 29.8 05/10/2016    MCHC 34.4 01/03/2025    MCHC 34.6 05/10/2016    RDW 13.3 01/03/2025    RDW 13.4 05/10/2016     (L) 01/03/2025     05/10/2016       BMP:  Lab Results   Component Value Date     04/02/2025     05/10/2016    POTASSIUM 3.7 04/02/2025    POTASSIUM 3.7 06/14/2022    POTASSIUM 3.9 05/10/2016    CHLORIDE 99 04/02/2025    CHLORIDE 105 06/14/2022    CHLORIDE 109 05/10/2016    CO2 28 04/02/2025    CO2 29 06/14/2022    CO2 24 " "05/10/2016    ANIONGAP 10 04/02/2025    ANIONGAP 3 06/14/2022    ANIONGAP 6 05/10/2016     (H) 04/02/2025     (H) 11/14/2024     (H) 06/14/2022    GLC 92 05/10/2016    BUN 14.5 04/02/2025    BUN 27 06/14/2022    BUN 18 05/10/2016    CR 0.93 04/02/2025    CR 1.05 05/10/2016    GFRESTIMATED >90 04/02/2025    GFRESTIMATED 73 05/10/2016    GFRESTBLACK 88 05/10/2016    REGINALD 9.5 04/02/2025    REGINALD 9.3 05/10/2016       LIPIDS:  Lab Results   Component Value Date    CHOL 171 04/02/2025    HDL 66 04/02/2025    LDL 93 04/02/2025    TRIG 62 04/02/2025       LFT:  Lab Results   Component Value Date    PROTTOTAL 6.8 04/02/2025    ALBUMIN 4.5 04/02/2025    BILITOTAL 0.6 04/02/2025    ALKPHOS 95 04/02/2025    AST 17 04/02/2025    ALT 16 04/02/2025       TFT:  Lab Results   Component Value Date    TSH 1.03 04/02/2025    TSH 1.08 06/14/2022       HgbA1c:   Lab Results   Component Value Date    A1C 9.0 (H) 04/02/2025    A1C 9.4 (H) 01/03/2025       INR RESULTS:  No results found for: \"INR\"    ECG (personally reviewed):  4/30/2025: Normal sinus rhythm, left anterior fascicular block.      7/31/2024: Normal sinus rhythm at 65 bpm, left anterior fascicular block, incomplete right bundle branch block.    Coronary CTA 1/19/2024:  Total calcium score of 188 as noted above. This places the patient in the   71st percentile for age/gender matched subjects.   Coronary arteries mild (25-50%) non occlusive disease in the proximal-mid   LAD composed of focal calcified plaques. There is evidence of   soft/noncalcified plaque at the ostium of the LAD and also at the   bifurcation of D1, both of which appear nonobstructive (25-50%).   Hemodynamic significance of the LAD will be checked by fractional flow CT.   Addendum: Fractional flow CT confirms the LAD atherosclerotic disease is   not hemodynamically significant (FFR beyond D1 bifurcation is 0.88).   Mildly dilated aortic root and proximal ascending aorta, measuring 4.0 and "   4.1 cm respectively.   Non cardiac findings will be reported by the radiologist.     Recommendation:     1. Aggressive risk factor modification for primary prevention ASCVD.     Echocardiogram 10/2/2023:  1. Left ventricular systolic function is low normal. The visual ejection  fraction is 50-55%.  2. Septal bounc presence. No regional wall motion abnormalities noted.  3. The right ventricle is normal in structure, function and size.  4. No evidence for significant valvular pathology.        IMPRESSION AND PLAN: Galdino Nelson is a 65 year old male with past medical history pertinent for type 1 diabetes mellitus (insulin pump), mild nonobstructive CAD (coronary CTA in 2024 demonstrated mild nonobstructive CAD; calcium score 188), hypertension, dyslipidemia, COPD, tobacco use, anxiety, and several other noncardiac comorbidities who is referred for mild troponin elevation noted during hospitalization for diabetic ketoacidosis in December 2024; [facility].     Mild troponin elevation during recent hospitalization in December 2024 (DKA): I concur with the opinion of the cardiology team at [facility] in December 2025 when he was admitted with DKA.  The mild cardiac troponin elevation likely represented supply/demand mismatch in the setting of DKA associated stress and was not representative of plaque rupture or ACS.  Relatively recent coronary CT angiogram in July 2024 demonstrated mild nonobstructive CAD (25-49% LAD lesion that was CT FFR negative).  He denies any current anginal symptomatology.  Ischemic testing is not currently recommended.  Recommend aggressive control of diabetes and cardiovascular risk factors.    Mild nonobstructive CAD: Relatively recent coronary CT angiogram in July 2024 demonstrated mild nonobstructive CAD (25-49% LAD lesion that was CT FFR negative).  Calcium score is elevated 188.  I would recommend aggressive control of cardiovascular risk factors in this diabetic gentleman.  Notably  diabetes is poorly controlled with hemoglobin A1c of 9.0; defer management to his primary care provider.  Please see comments below regarding dyslipidemia.  Aspirin 81 mg daily is recommended given presence of coronary calcification.    Dyslipidemia: He is currently treated with atorvastatin 40 mg daily.  LDL is 93.  Recommend target LDL of less than 70.  Compliance issues with atorvastatin; forgets to take it at night.  I recommended switching to rosuvastatin 20 mg daily which can be taken in the morning (longer acting medicine).  Recommend checking fasting lipids in 2 months and I will contact him with results.      Type 1 diabetes mellitus: Given insulin pump.  Recently hemoglobin A1c 9.0.  Defer management to his endocrinologist.    Ongoing tobacco use: Ongoing tobacco use.  Evidence of coronary calcification as well as COPD.  I recommended tobacco cessation; he thinks he will continue to smoke.        Thank you for the opportunity to participate in the care of this patient. Please do not hesitate to contact me with any questions or concerns.    CC: Vianey Downey MD  [address] [place] [address]    Today's clinic visit entailed:    40 minutes spent by me on the date of the encounter doing chart review, review of test results, interpretation of tests, patient visit, and documentation   Provider  Link to [facility] Help Grid     The level of medical decision making during this visit was of moderate complexity.      Hazel Reaves MD, FACC, FASE, [facility]      Thank you for allowing me to participate in the care of your patient.      Sincerely,     Hazel Reaves MD     [facility]  cc:   Vianey Downey MD  [address] [place], [place] [id number]

## 2025-04-30 NOTE — PATIENT INSTRUCTIONS
For cholesterol, let's switch to rosuvastatin 20 mg daily. Check fasting lipids in 2 months. I will contact you with results.  Start aspirin 81 mg daily.

## 2025-05-20 ENCOUNTER — VIRTUAL VISIT (OUTPATIENT)
Dept: EDUCATION SERVICES | Facility: CLINIC | Age: 65
End: 2025-05-20
Payer: COMMERCIAL

## 2025-05-20 DIAGNOSIS — E10.65 UNCONTROLLED TYPE 1 DIABETES MELLITUS WITH HYPERGLYCEMIA (H): ICD-10-CM

## 2025-05-20 RX ORDER — LANCETS
EACH MISCELLANEOUS
Qty: 102 EACH | Refills: 3 | Status: SHIPPED | OUTPATIENT
Start: 2025-05-20

## 2025-05-20 RX ORDER — INSULIN ASPART 100 [IU]/ML
INJECTION, SOLUTION INTRAVENOUS; SUBCUTANEOUS
Qty: 50 ML | Refills: 3 | Status: SHIPPED | OUTPATIENT
Start: 2025-05-20

## 2025-05-20 NOTE — LETTER
5/20/2025         RE: Galdino Nelson  [address]  [facility] [address]        Dear Colleague,    Thank you for referring your patient, Galdino Nelson, to the [facility]. Please see a copy of my visit note below.    Diabetes Self-Management Education & Support    Presents for: Insulin Pump and CGM Review in DM1/ELIZABETH    Type of service:  Video Visit    If the video visit is dropped, the video visit invitation should be resent by: Text to cell phone: [phone]    Originating Location (pt. Location): Home  Distant Location (provider location): [facility]  Mode of Communication:  Video Conference via [employer]    Video Start Time: 758am  Video End Time (time video stopped): 815am    How would patient like to obtain AVS? MyChart      Assessment:    Insulin Pump Information  Insulin Pump Brand: OmniPod  Does patient have an insulin multiple daily injection back-up plan?: Yes      CGM summary, last 2 weeks:  Avg  mg/dL  GMI NA but around 8%  Time in Range (TIR)  mg/dL is 52%  0% low under 70 mg/dL (0% less than 54)  30% high 181-250 mg/dL  18% very high over 250 mg/dL  Glucose trends show: highs after breakfast, supper and overnight  Daily sensor glucose data shows: sensor data lacking, some communication issues still, better at not missing boluses but still needs to work on pre-bolusing.  Still going high after most carb boluses and corrections not usually getting him to target.  Overtreating near lows.    Omnipod Insulin Pump Statistics:  Total Daily Dose (TDD): 39.2 units, with 40% bolus and 60% basal  Bolus overrides per day: 0% or 0 boluses  Automated mode basal of 23.7 units vs manual mode basal total of 20.2 units  Average number of carbs entered daily: 132.3 grams   Time spent in automated mode 75 %  Time CGM active 61.7%    Galdino still needs more insulin with boluses.  Carb ratios changed at 12am from 14 to 12,  and at 4am and 6pm from 8.8 to 7.8.  ISF changed from 50 to 45.  Discussed importance of pre bolusing, accurate carb counting and not overtreating lows.  Discussed importance of remaining in automation and to check when doing each bolus.  Discussed importance of having glucagon and ketone sticks on hand for T1DM.  Reviewed how and when to review each.  All diabetes rx's sent to  specialty per Galdino's request.  Follow-up scheduled in 1 month but he will reach out to me if he has problems before then.      Reports                    Care Plan and Education Provided:  Pump Hardware & Software:   -- Navigating insulin pump screen/functions/features  -- Automated insulin delivery functions/features  -- Pump Alerts & Alarms   Bolusing:  -- Importance of accurate carbohydrate counting  -- Importance of bolusing before meals  Problem Solving:  -- Treating hypoglycemia with use of automated insulin delivery systems  -- Glucagon use   -- DKA prevention & steps to take when glucose is above 240 mg/dL   -- Multiple Daily injection back-up plan in case of pump failure  Continuous Glucose Monitoring:  -- Troubleshooting  -- CGM Alerts & Alarms     Patient verbalized understanding of diabetes self-management education concepts discussed, opportunities for ongoing education and support, and recommendations provided today.    Plan    Patient Instructions        More insulin given with carbs and corrections.  Continue to focus on getting your carb boluses in 10-15 minutes before you eat and try to correct when high.  Stay in automation at all times- check your home screen when bolusing to ensure it says automation.  Don't overtreat your lows.  When near low, only 5 to 8g of carb is needed.  Glucagon (Gvoke pens) and ketone sticks ordered from  Specialty.  Testing supplies and insulin vial sent to  Specialty per your request.    Amina Chambers, PharmD, [facility], [facility] and [place] [facility]    [place]  Diabetes Education and Nutrition Services for the [facility]:  For Your Diabetes Education and Nutrition Appointments Call:  [phone]   For Diabetes Education or Nutrition Related Questions:   Phone: [phone]  Send [employer] Message   If you need a medication refill please contact your pharmacy. Please allow 3 business days for your refills to be completed.  I truly appreciate your feedback on our appointment together. You will either receive an email, text message, and/or phone call survey about today's appointment. Please complete this survey as soon as you are able so I can continue to improve upon my practice. Thank you!      Omnipod insulin pump settings, including changes made today:     Basal rates (units/hour):  12am   1   5am    0.8      Insulin to carb ratios (grams/unit):  12am    12   4am    7.8  6pm     7.8     Insulin sensitivity/correction (mg/dL/unit):  12am    45     Active insulin time: 3 hours     Blood glucose target range/correction threshold:  12am    110 - 110         Insulin Pump Back Up Plan:     If your insulin pump fails or has an issue, the first thing you should do is call the pump company and have them troubleshoot.              *[employer] 24-hour Customer Support [phone]              *Tandem 24-hour Customer Support [phone]              *[employer] (Omnipod) 24-hour Customer Support [phone]              *[employer] (ilet) 24-hour Customer Support [phone]     If they determine you need a new pump, you will have to go back to using insulin injections until your new pump arrives.  You will need either insulin pen(s) and pen needles and/or insulin syringes to do injections if your pump is not working.  You can purchase pen needles and insulin syringes at any pharmacy without a prescription.  Just ask at the pharmacy counter.     With your long acting insulin, take 20 units once daily   -these insulins could also be called Lantus, Basaglar, Semglee,  Rezvoglar (insulin glargine), Levemir (insulin detemir), Toujeo (insulin glargine U300), Tresiba (insulin degludec).  -If you do not have a current long acting prescription and you are unable to reach your doctor to have one sent to the pharmacy, then you can buy a similar long acting insulin called NPH over the counter with no prescription needed at around $25 a vial from any pharmacy.  If you choose this option, you need to take 1/2 the dose above every 12 hours.  Your dose would be 10 units twice a day.     With your rapid acting insulin Novolog, take 1 unit for every 8 grams of carbohydrate and to correct high blood sugars, take 1 unit for every 50 over 150.  -these insulins could be called Novolog, Merilog, Fiasp (insulin aspart), Humalog, Admelog, Lyumjev (insulin lispro U100 or U200), or Apidra (insulin glulisine).     If you don't have long acting, you must take corrections every 3 -4 hours with your rapid acting insulin to keep your blood sugars under control.  This includes overnight.       **If you have any questions regarding this information or need clarification, please call Candice Sanabria's office.        High Blood Sugar (>240 mg/dl) Protocol for Pump Users:  If a single blood sugar reading is above 240 mg/dl, follow these steps:   1. Immediately take a correction bolus with the pump using your bolus wizard (like normal).    2. Recheck your blood sugar in 1 hour.  If your blood sugar has gone down, your pump is working properly and continue to monitor your blood sugars as usual. If your blood sugar is not coming down in an hour, follow these steps closely and entirely:    A. You need to take an injection of rapid acting insulin using an insulin syringe or insulin pen (not using your pump).  Give this dose 2 to 3 hours to work.  Do not give any correction doses with your pump until this time is up or you will stack insulin and likely have a low blood sugar later.    B. Change out your entire infusion set,  tubing and reservoir, being sure to use a fresh unopened, unused vial of insulin if you have it.  Changing your site is a MUST!  Do it right away.  IF IN DOUBT, CHANGE IT OUT!!    C. Check your blood or urine for ketones.  If you have moderate or large urine ketones or if your blood ketones are over 0.6 mmol/L- Call your provider!!  You will need to take additional insulin and they can provide instruction.  Do not wait for them to get back to you before doing the following steps: Drink at least a cup of water every hour and limit physical activity.  1.  Recheck your blood glucose and ketones in 60 minutes.  If your blood sugar is still not coming down and ketones are still present- REPORT TO YOUR NEAREST EMERGENCY ROOM!      **If at any time, you have symptoms of extreme headache, severe nausea, any vomiting or trouble breathing in conjunction with elevated blood sugars- immediately report to your nearest emergency room!          Ketone Testing and Diabetic Ketoacidosis (DKA)    Ketones are acids that are produced when your body does not have enough insulin, and burns fat for fuel. Testing for ketones gives an early warning of a medical emergency, called diabetic ketoacidosis (DKA). DKA can lead to coma or death. DKA can occur even if the blood sugars are not high.     What causes a person with diabetes to have ketones?   -Not taking your insulin injections, especially your long-acting insulin  -If on an insulin pump, you could have a problem with your infusion set (bent cannula under your skin, bad site, bad connection)  -Bad insulin (old, not stored correctly)  -Illness, infection, injuries, stress    When Should I Test for Ketones?   People with Type 1 diabetes should test if:   -Blood glucose is 240 mg/dl or higher for two tests in a row with no explanation.  -Any time you are sick (even with a cold).   -If you are vomiting and/or have diarrhea.      How Do I Test for Ketones in My Urine?   Dip a test strip into  a sample of your urine. Compare to the color chart that comes with the ketone strips. You can get these strips at your pharmacy without a prescription.  Make sure to read package instructions on proper storage.    What Do I Do if My Ketone Test Is Positive?   **if you ever have symptoms of DKA such as: stomach pain, nausea or vomiting, trouble breathing/breathing fast or fruity smelling breath along with any amount of ketones, report directly to your nearest emergency room.    **In all cases, drink a glass of water every hour and DO NOT EXERCISE as this can worsen your ketone level.  **If on an insulin pump, be sure to follow your insulin pump high blood sugar protocol.    On initial check, if your urine ketones are at a trace or small amount:    A. Take your normal correction dose of insulin   B. Check blood glucose and ketones again in 2 to 3 hours  C. If your blood sugar is still over 240mg/dL AND you still have ketones, you need to take additional insulin with your next correction bolus- please contact your doctor.  D. Continue to monitor ketones and blood sugar closely (every 1 to 2 hours) until they normalize.     On initial check, if your urine ketones are at a moderate or large amount:  A. Follow the steps above under #1 and call your doctor IMMEDIATELY or report to the nearest emergency room.  You will need advice on how much extra insulin to take, based on your situation.        See Care Plan for co-developed, patient-state behavior change goals.    Education Materials Provided:  No new materials provided today      Subjective/Objective  Galdino is an 65 year old, presenting for the following diabetes education related to: Insulin Pump and CGM Review  Accompanied by: Self  Diabetes education in the past 24mo: Yes  Focus of Visit: Insulin Pump  Type of Pump visit: Pump Review  Diabetes type: Type 1  Date of diagnosis: yobany JOHNSON 2020?  Disease course: Getting harder to manage  How confident are you filling out  "medical forms by yourself:: Extremely  Diabetes management related comments/concerns: wants all diabetes meds sent to  Specialty Pharmacy  Transportation concerns: No  Difficulty affording diabetes medication?: Yes  Difficulty affording diabetes testing supplies?: No  Other concerns: Glasses  Cultural Influences/Ethnic Background:  Not  or     Galdino Nelson is seen for diabetes education follow up.  Our last visit was on 4/29/25.  I gave more insulin with boluses.    Diabetes Medication(s)       Insulin       NOVOLOG RELION 100 UNIT/ML vial Inj 50 unit(s) subcutaneous daily via the omnipod disposable pump          Galdino states he has had sensor issues the past couple weeks.  One or two failed.  He also still gets communication errors sometimes and doesn't understand why. He has his CGM on his arm and his pod on his abdomen.      Galdino has been trying harder to get his boluses in before he eats.    Galdino has never had glucagon or ketone sticks.  His mom had glucagon so he is aware of what it is and what it is used for.      Diabetes Symptoms & Complications:  Diabetes Related Symptoms: Polyuria (increased urination)  Weight trend: Stable  Symptom course: Stable  Disease course: Getting harder to manage  Complications assessed today?: No    Patient Problem List and Family Medical History reviewed for relevant medical history, current medical status, and diabetes risk factors.    Vitals:  There were no vitals taken for this visit.  Estimated body mass index is 27.41 kg/m  as calculated from the following:    Height as of 4/30/25: 1.778 m (5' 10\").    Weight as of 4/30/25: 86.6 kg (191 lb).   Last 3 BP:   BP Readings from Last 3 Encounters:   04/30/25 (!) 146/90   01/03/25 (!) 142/92   11/14/24 (!) 149/103       History   Smoking Status    Every Day    Types: Cigarettes   Smokeless Tobacco    Never       Labs:  Lab Results   Component Value Date    A1C 9.0 04/02/2025     Lab Results   Component Value " Date     04/02/2025     11/14/2024     06/14/2022    GLC 92 05/10/2016     Lab Results   Component Value Date    LDL 93 04/02/2025     Direct Measure HDL   Date Value Ref Range Status   04/02/2025 66 >=40 mg/dL Final     GFR Estimate   Date Value Ref Range Status   04/02/2025 >90 >60 mL/min/1.73m2 Final     Comment:     eGFR calculated using 2021 CKD-EPI equation.   05/10/2016 73 >60 mL/min/1.7m2 Final     Comment:     Non  GFR Calc     GFR Estimate If Black   Date Value Ref Range Status   05/10/2016 88 >60 mL/min/1.7m2 Final     Comment:      GFR Calc     Lab Results   Component Value Date    CR 0.93 04/02/2025    CR 1.05 05/10/2016     Lab Results   Component Value Date    MICROL <12.0 04/02/2025    UMALCR  04/02/2025      Comment:      Unable to calculate, urine albumin and/or urine creatinine is outside detectable limits.  Microalbuminuria is defined as an albumin:creatinine ratio of 17 to 299 for males and 25 to 299 for females. A ratio of albumin:creatinine of 300 or higher is indicative of overt proteinuria.  Due to biologic variability, positive results should be confirmed by a second, first-morning random or 24-hour timed urine specimen. If there is discrepancy, a third specimen is recommended. When 2 out of 3 results are in the microalbuminuria range, this is evidence for incipient nephropathy and warrants increased efforts at glucose control, blood pressure control, and institution of therapy with an angiotensin-converting-enzyme (ACE) inhibitor (if the patient can tolerate it).      UCRR 81.9 04/02/2025 5/20/2025   Monitoring   Monitoring Assessed Today Yes   Blood Glucose Meter Accu-chek;CGM   Times checking blood sugar at home (number) 5+   Times checking blood sugar at home (per) Day   Blood glucose trend Increasing       Diabetes Medication(s)       Diabetic Other       Glucagon (GVOKE HYPOPEN) 1 MG/0.2ML pen Inject the contents of 1  device under the skin into lower abdomen, outer thigh, or outer upper arm as needed for hypoglycemia. If no response after 15 minutes, additional 1 mg dose from a new device may be injected while waiting for emergency assistance.       Insulin       insulin aspart (NOVOLOG VIAL) 100 UNITS/ML vial For use in Omnipod insulin pump.  Max daily dose of 60 units.     NOVOLOG RELION 100 UNIT/ML vial Inj 50 unit(s) subcutaneous daily via the omnipod disposable pump              5/20/2025   Taking Medications   Taking Medication Assessed Today Yes   Current Treatments Insulin Pump   Problems taking diabetes medications regularly? No   Diabetes medication side effects? No         5/20/2025   Problem Solving   Problem Solving Assessed Today Yes   Is the patient at risk for hypoglycemia? Yes   Hypoglycemia Frequency Weekly   Does patient have glucagon emergency kit? No   Is the patient at risk for DKA? Yes   Does patient have ketone test strips? No   Does patient have DKA prevention plan? No   Does patient have severe weather/disaster plan for diabetes management? Not Needed       Amina Chambers, PharmD, [facility], [facility] and [place] [facility]    Time Spent: 17 minutes  Encounter Type: Individual    Any diabetes medication dose changes were made via the [facility] Standing Orders under the patient's referring provider.

## 2025-05-20 NOTE — PROGRESS NOTES
Diabetes Self-Management Education & Support    Presents for: Insulin Pump and CGM Review in DM1/ELIZABETH    Type of service:  Video Visit    If the video visit is dropped, the video visit invitation should be resent by: Text to cell phone: [phone]    Originating Location (pt. Location): Home  Distant Location (provider location): [facility] DIABETES EDUCATION [place]  Mode of Communication:  Video Conference via [employer]    Video Start Time: 758am  Video End Time (time video stopped): 815am    How would patient like to obtain AVS? MyChart      Assessment:    Insulin Pump Information  Insulin Pump Brand: OmniPod  Does patient have an insulin multiple daily injection back-up plan?: Yes      CGM summary, last 2 weeks:  Avg  mg/dL  GMI NA but around 8%  Time in Range (TIR)  mg/dL is 52%  0% low under 70 mg/dL (0% less than 54)  30% high 181-250 mg/dL  18% very high over 250 mg/dL  Glucose trends show: highs after breakfast, supper and overnight  Daily sensor glucose data shows: sensor data lacking, some communication issues still, better at not missing boluses but still needs to work on pre-bolusing.  Still going high after most carb boluses and corrections not usually getting him to target.  Overtreating near lows.    Omnipod Insulin Pump Statistics:  Total Daily Dose (TDD): 39.2 units, with 40% bolus and 60% basal  Bolus overrides per day: 0% or 0 boluses  Automated mode basal of 23.7 units vs manual mode basal total of 20.2 units  Average number of carbs entered daily: 132.3 grams   Time spent in automated mode 75 %  Time CGM active 61.7%    Galdino still needs more insulin with boluses.  Carb ratios changed at 12am from 14 to 12, and at 4am and 6pm from 8.8 to 7.8.  ISF changed from 50 to 45.  Discussed importance of pre bolusing, accurate carb counting and not overtreating lows.  Discussed importance of remaining in automation and to check when doing each bolus.  Discussed importance of  having glucagon and ketone sticks on hand for T1DM.  Reviewed how and when to review each.  All diabetes rx's sent to [facility] per Galdino's request.  Follow-up scheduled in 1 month but he will reach out to me if he has problems before then.      Reports                    Care Plan and Education Provided:  Pump Hardware & Software:   -- Navigating insulin pump screen/functions/features  -- Automated insulin delivery functions/features  -- Pump Alerts & Alarms   Bolusing:  -- Importance of accurate carbohydrate counting  -- Importance of bolusing before meals  Problem Solving:  -- Treating hypoglycemia with use of automated insulin delivery systems  -- Glucagon use   -- DKA prevention & steps to take when glucose is above 240 mg/dL   -- Multiple Daily injection back-up plan in case of pump failure  Continuous Glucose Monitoring:  -- Troubleshooting  -- CGM Alerts & Alarms     Patient verbalized understanding of diabetes self-management education concepts discussed, opportunities for ongoing education and support, and recommendations provided today.    Plan    Patient Instructions        More insulin given with carbs and corrections.  Continue to focus on getting your carb boluses in 10-15 minutes before you eat and try to correct when high.  Stay in automation at all times- check your home screen when bolusing to ensure it says automation.  Don't overtreat your lows.  When near low, only 5 to 8g of carb is needed.  Glucagon (Gvoke pens) and ketone sticks ordered from [facility].  Testing supplies and insulin vial sent to [facility] per your request.    Amina Chambers, PharmD, [facility] [place][note 2], [facility] and [place] [facility]    [facility] for the [facility]:  For Your Diabetes Education and Nutrition Appointments Call:  [phone]   For Diabetes Education or Nutrition Related Questions:   Phone: [phone]  Send [employer] Message   If you need a  medication refill please contact your pharmacy. Please allow 3 business days for your refills to be completed.  I truly appreciate your feedback on our appointment together. You will either receive an email, text message, and/or phone call survey about today's appointment. Please complete this survey as soon as you are able so I can continue to improve upon my practice. Thank you!      Omnipod insulin pump settings, including changes made today:     Basal rates (units/hour):  12am   1   5am    0.8      Insulin to carb ratios (grams/unit):  12am    12   4am    7.8  6pm     7.8     Insulin sensitivity/correction (mg/dL/unit):  12am    45     Active insulin time: 3 hours     Blood glucose target range/correction threshold:  12am    110 - 110         Insulin Pump Back Up Plan:     If your insulin pump fails or has an issue, the first thing you should do is call the pump company and have them troubleshoot.              *[employer] 24-hour Customer Support [phone]              *[employer]andem 24-hour Customer Support [phone]              *[employer] (Omnipod) 24-hour Customer Support [phone]              *[employer] (ilet) 24-hour Customer Support [phone]     If they determine you need a new pump, you will have to go back to using insulin injections until your new pump arrives.  You will need either insulin pen(s) and pen needles and/or insulin syringes to do injections if your pump is not working.  You can purchase pen needles and insulin syringes at any pharmacy without a prescription.  Just ask at the pharmacy counter.     With your long acting insulin, take 20 units once daily   -these insulins could also be called Lantus, Basaglar, Semglee, Rezvoglar (insulin glargine), Levemir (insulin detemir), Toujeo (insulin glargine U300), Tresiba (insulin degludec).  -If you do not have a current long acting prescription and you are unable to reach your doctor to have one sent to the pharmacy, then you can buy  a similar long acting insulin called NPH over the counter with no prescription needed at around $25 a vial from any pharmacy.  If you choose this option, you need to take 1/2 the dose above every 12 hours.  Your dose would be 10 units twice a day.     With your rapid acting insulin Novolog, take 1 unit for every 8 grams of carbohydrate and to correct high blood sugars, take 1 unit for every 50 over 150.  -these insulins could be called Novolog, Merilog, Fiasp (insulin aspart), Humalog, Admelog, Lyumjev (insulin lispro U100 or U200), or Apidra (insulin glulisine).     If you don't have long acting, you must take corrections every 3 -4 hours with your rapid acting insulin to keep your blood sugars under control.  This includes overnight.       **If you have any questions regarding this information or need clarification, please call Candice Sanabria's office.        High Blood Sugar (>240 mg/dl) Protocol for Pump Users:  If a single blood sugar reading is above 240 mg/dl, follow these steps:   1. Immediately take a correction bolus with the pump using your bolus wizard (like normal).    2. Recheck your blood sugar in 1 hour.  If your blood sugar has gone down, your pump is working properly and continue to monitor your blood sugars as usual. If your blood sugar is not coming down in an hour, follow these steps closely and entirely:    A. You need to take an injection of rapid acting insulin using an insulin syringe or insulin pen (not using your pump).  Give this dose 2 to 3 hours to work.  Do not give any correction doses with your pump until this time is up or you will stack insulin and likely have a low blood sugar later.    B. Change out your entire infusion set, tubing and reservoir, being sure to use a fresh unopened, unused vial of insulin if you have it.  Changing your site is a MUST!  Do it right away.  IF IN DOUBT, CHANGE IT OUT!!    C. Check your blood or urine for ketones.  If you have moderate or large urine  ketones or if your blood ketones are over 0.6 mmol/L- Call your provider!!  You will need to take additional insulin and they can provide instruction.  Do not wait for them to get back to you before doing the following steps: Drink at least a cup of water every hour and limit physical activity.  1.  Recheck your blood glucose and ketones in 60 minutes.  If your blood sugar is still not coming down and ketones are still present- REPORT TO YOUR NEAREST EMERGENCY ROOM!      **If at any time, you have symptoms of extreme headache, severe nausea, any vomiting or trouble breathing in conjunction with elevated blood sugars- immediately report to your nearest emergency room!          Ketone Testing and Diabetic Ketoacidosis (DKA)    Ketones are acids that are produced when your body does not have enough insulin, and burns fat for fuel. Testing for ketones gives an early warning of a medical emergency, called diabetic ketoacidosis (DKA). DKA can lead to coma or death. DKA can occur even if the blood sugars are not high.     What causes a person with diabetes to have ketones?   -Not taking your insulin injections, especially your long-acting insulin  -If on an insulin pump, you could have a problem with your infusion set (bent cannula under your skin, bad site, bad connection)  -Bad insulin (old, not stored correctly)  -Illness, infection, injuries, stress    When Should I Test for Ketones?   People with Type 1 diabetes should test if:   -Blood glucose is 240 mg/dl or higher for two tests in a row with no explanation.  -Any time you are sick (even with a cold).   -If you are vomiting and/or have diarrhea.      How Do I Test for Ketones in My Urine?   Dip a test strip into a sample of your urine. Compare to the color chart that comes with the ketone strips. You can get these strips at your pharmacy without a prescription.  Make sure to read package instructions on proper storage.    What Do I Do if My Ketone Test Is Positive?    **if you ever have symptoms of DKA such as: stomach pain, nausea or vomiting, trouble breathing/breathing fast or fruity smelling breath along with any amount of ketones, report directly to your nearest emergency room.    **In all cases, drink a glass of water every hour and DO NOT EXERCISE as this can worsen your ketone level.  **If on an insulin pump, be sure to follow your insulin pump high blood sugar protocol.    On initial check, if your urine ketones are at a trace or small amount:    A. Take your normal correction dose of insulin   B. Check blood glucose and ketones again in 2 to 3 hours  C. If your blood sugar is still over 240mg/dL AND you still have ketones, you need to take additional insulin with your next correction bolus- please contact your doctor.  D. Continue to monitor ketones and blood sugar closely (every 1 to 2 hours) until they normalize.     On initial check, if your urine ketones are at a moderate or large amount:  A. Follow the steps above under #1 and call your doctor IMMEDIATELY or report to the nearest emergency room.  You will need advice on how much extra insulin to take, based on your situation.        See Care Plan for co-developed, patient-state behavior change goals.    Education Materials Provided:  No new materials provided today      Subjective/Objective  Galdino is an 65 year old, presenting for the following diabetes education related to: Insulin Pump and CGM Review  Accompanied by: Self  Diabetes education in the past 24mo: Yes  Focus of Visit: Insulin Pump  Type of Pump visit: Pump Review  Diabetes type: Type 1  Date of diagnosis: yobany JOHNSON 2020?  Disease course: Getting harder to manage  How confident are you filling out medical forms by yourself:: Extremely  Diabetes management related comments/concerns: wants all diabetes meds sent to  Specialty Pharmacy  Transportation concerns: No  Difficulty affording diabetes medication?: Yes  Difficulty affording diabetes testing  "supplies?: No  Other concerns: Glasses  Cultural Influences/Ethnic Background:  Not  or     Galdino Nelson is seen for diabetes education follow up.  Our last visit was on 4/29/25.  I gave more insulin with boluses.    Diabetes Medication(s)       Insulin       NOVOLOG RELION 100 UNIT/ML vial Inj 50 unit(s) subcutaneous daily via the omnipod disposable pump          Galdino states he has had sensor issues the past couple weeks.  One or two failed.  He also still gets communication errors sometimes and doesn't understand why. He has his CGM on his arm and his pod on his abdomen.      Galdino has been trying harder to get his boluses in before he eats.    Galdino has never had glucagon or ketone sticks.  His mom had glucagon so he is aware of what it is and what it is used for.      Diabetes Symptoms & Complications:  Diabetes Related Symptoms: Polyuria (increased urination)  Weight trend: Stable  Symptom course: Stable  Disease course: Getting harder to manage  Complications assessed today?: No    Patient Problem List and Family Medical History reviewed for relevant medical history, current medical status, and diabetes risk factors.    Vitals:  There were no vitals taken for this visit.  Estimated body mass index is 27.41 kg/m  as calculated from the following:    Height as of 4/30/25: 1.778 m (5' 10\").    Weight as of 4/30/25: 86.6 kg (191 lb).   Last 3 BP:   BP Readings from Last 3 Encounters:   04/30/25 (!) 146/90   01/03/25 (!) 142/92   11/14/24 (!) 149/103       History   Smoking Status    Every Day    Types: Cigarettes   Smokeless Tobacco    Never       Labs:  Lab Results   Component Value Date    A1C 9.0 04/02/2025     Lab Results   Component Value Date     04/02/2025     11/14/2024     06/14/2022    GLC 92 05/10/2016     Lab Results   Component Value Date    LDL 93 04/02/2025     Direct Measure HDL   Date Value Ref Range Status   04/02/2025 66 >=40 mg/dL Final     GFR Estimate "   Date Value Ref Range Status   04/02/2025 >90 >60 mL/min/1.73m2 Final     Comment:     eGFR calculated using 2021 CKD-EPI equation.   05/10/2016 73 >60 mL/min/1.7m2 Final     Comment:     Non  GFR Calc     GFR Estimate If Black   Date Value Ref Range Status   05/10/2016 88 >60 mL/min/1.7m2 Final     Comment:      GFR Calc     Lab Results   Component Value Date    CR 0.93 04/02/2025    CR 1.05 05/10/2016     Lab Results   Component Value Date    MICROL <12.0 04/02/2025    UMALCR  04/02/2025      Comment:      Unable to calculate, urine albumin and/or urine creatinine is outside detectable limits.  Microalbuminuria is defined as an albumin:creatinine ratio of 17 to 299 for males and 25 to 299 for females. A ratio of albumin:creatinine of 300 or higher is indicative of overt proteinuria.  Due to biologic variability, positive results should be confirmed by a second, first-morning random or 24-hour timed urine specimen. If there is discrepancy, a third specimen is recommended. When 2 out of 3 results are in the microalbuminuria range, this is evidence for incipient nephropathy and warrants increased efforts at glucose control, blood pressure control, and institution of therapy with an angiotensin-converting-enzyme (ACE) inhibitor (if the patient can tolerate it).      UCRR 81.9 04/02/2025 5/20/2025   Monitoring   Monitoring Assessed Today Yes   Blood Glucose Meter Accu-chek;CGM   Times checking blood sugar at home (number) 5+   Times checking blood sugar at home (per) Day   Blood glucose trend Increasing       Diabetes Medication(s)       Diabetic Other       Glucagon (GVOKE HYPOPEN) 1 MG/0.2ML pen Inject the contents of 1 device under the skin into lower abdomen, outer thigh, or outer upper arm as needed for hypoglycemia. If no response after 15 minutes, additional 1 mg dose from a new device may be injected while waiting for emergency assistance.       Insulin       insulin  aspart (NOVOLOG VIAL) 100 UNITS/ML vial For use in Omnipod insulin pump.  Max daily dose of 60 units.     NOVOLOG RELION 100 UNIT/ML vial Inj 50 unit(s) subcutaneous daily via the omnipod disposable pump              5/20/2025   Taking Medications   Taking Medication Assessed Today Yes   Current Treatments Insulin Pump   Problems taking diabetes medications regularly? No   Diabetes medication side effects? No         5/20/2025   Problem Solving   Problem Solving Assessed Today Yes   Is the patient at risk for hypoglycemia? Yes   Hypoglycemia Frequency Weekly   Does patient have glucagon emergency kit? No   Is the patient at risk for DKA? Yes   Does patient have ketone test strips? No   Does patient have DKA prevention plan? No   Does patient have severe weather/disaster plan for diabetes management? Not Needed       Amina Chambers, PharmD, [facility], [facility] and [place] [facility]    Time Spent: 17 minutes  Encounter Type: Individual    Any diabetes medication dose changes were made via the LAN Standing Orders under the patient's referring provider.

## 2025-05-20 NOTE — PATIENT INSTRUCTIONS
More insulin given with carbs and corrections.  Continue to focus on getting your carb boluses in 10-15 minutes before you eat and try to correct when high.  Stay in automation at all times- check your home screen when bolusing to ensure it says automation.  Don't overtreat your lows.  When near low, only 5 to 8g of carb is needed.  Glucagon (Gvoke pens) and ketone sticks ordered from [facility].  Testing supplies and insulin vial sent to [facility] per your request.    Amina Chambers, PharmD, [facility], [facility] and [facility]    [facility] for the [facility] Area:  For Your Diabetes Education and Nutrition Appointments Call:  [phone]   For Diabetes Education or Nutrition Related Questions:   Phone: [phone]  Send [employer] Message   If you need a medication refill please contact your pharmacy. Please allow 3 business days for your refills to be completed.  I truly appreciate your feedback on our appointment together. You will either receive an email, text message, and/or phone call survey about today's appointment. Please complete this survey as soon as you are able so I can continue to improve upon my practice. Thank you!      Omnipod insulin pump settings, including changes made today:     Basal rates (units/hour):  12am   1   5am    0.8      Insulin to carb ratios (grams/unit):  12am    12   4am    7.8  6pm     7.8     Insulin sensitivity/correction (mg/dL/unit):  12am    45     Active insulin time: 3 hours     Blood glucose target range/correction threshold:  12am    110 - 110         Insulin Pump Back Up Plan:     If your insulin pump fails or has an issue, the first thing you should do is call the pump company and have them troubleshoot.              *Medtronic 24-hour Customer Support [phone]              *Tandem 24-hour Customer Support [phone]              *[employer] (Omnipod) 24-hour Customer Support  [phone]              *[employer] (ilet) 24-hour Customer Support [phone]     If they determine you need a new pump, you will have to go back to using insulin injections until your new pump arrives.  You will need either insulin pen(s) and pen needles and/or insulin syringes to do injections if your pump is not working.  You can purchase pen needles and insulin syringes at any pharmacy without a prescription.  Just ask at the pharmacy counter.     With your long acting insulin, take 20 units once daily   -these insulins could also be called Lantus, Basaglar, Semglee, Rezvoglar (insulin glargine), Levemir (insulin detemir), Toujeo (insulin glargine U300), Tresiba (insulin degludec).  -If you do not have a current long acting prescription and you are unable to reach your doctor to have one sent to the pharmacy, then you can buy a similar long acting insulin called NPH over the counter with no prescription needed at around $25 a vial from any pharmacy.  If you choose this option, you need to take 1/2 the dose above every 12 hours.  Your dose would be 10 units twice a day.     With your rapid acting insulin Novolog, take 1 unit for every 8 grams of carbohydrate and to correct high blood sugars, take 1 unit for every 50 over 150.  -these insulins could be called Novolog, Merilog, Fiasp (insulin aspart), Humalog, Admelog, Lyumjev (insulin lispro U100 or U200), or Apidra (insulin glulisine).     If you don't have long acting, you must take corrections every 3 -4 hours with your rapid acting insulin to keep your blood sugars under control.  This includes overnight.       **If you have any questions regarding this information or need clarification, please call Candice Sanabria's office.        High Blood Sugar (>240 mg/dl) Protocol for Pump Users:  If a single blood sugar reading is above 240 mg/dl, follow these steps:   1. Immediately take a correction bolus with the pump using your bolus wizard (like  normal).    2. Recheck your blood sugar in 1 hour.  If your blood sugar has gone down, your pump is working properly and continue to monitor your blood sugars as usual. If your blood sugar is not coming down in an hour, follow these steps closely and entirely:    A. You need to take an injection of rapid acting insulin using an insulin syringe or insulin pen (not using your pump).  Give this dose 2 to 3 hours to work.  Do not give any correction doses with your pump until this time is up or you will stack insulin and likely have a low blood sugar later.    B. Change out your entire infusion set, tubing and reservoir, being sure to use a fresh unopened, unused vial of insulin if you have it.  Changing your site is a MUST!  Do it right away.  IF IN DOUBT, CHANGE IT OUT!!    C. Check your blood or urine for ketones.  If you have moderate or large urine ketones or if your blood ketones are over 0.6 mmol/L- Call your provider!!  You will need to take additional insulin and they can provide instruction.  Do not wait for them to get back to you before doing the following steps: Drink at least a cup of water every hour and limit physical activity.  1.  Recheck your blood glucose and ketones in 60 minutes.  If your blood sugar is still not coming down and ketones are still present- REPORT TO YOUR NEAREST EMERGENCY ROOM!      **If at any time, you have symptoms of extreme headache, severe nausea, any vomiting or trouble breathing in conjunction with elevated blood sugars- immediately report to your nearest emergency room!          Ketone Testing and Diabetic Ketoacidosis (DKA)    Ketones are acids that are produced when your body does not have enough insulin, and burns fat for fuel. Testing for ketones gives an early warning of a medical emergency, called diabetic ketoacidosis (DKA). DKA can lead to coma or death. DKA can occur even if the blood sugars are not high.     What causes a person with diabetes to have ketones?    -Not taking your insulin injections, especially your long-acting insulin  -If on an insulin pump, you could have a problem with your infusion set (bent cannula under your skin, bad site, bad connection)  -Bad insulin (old, not stored correctly)  -Illness, infection, injuries, stress    When Should I Test for Ketones?   People with Type 1 diabetes should test if:   -Blood glucose is 240 mg/dl or higher for two tests in a row with no explanation.  -Any time you are sick (even with a cold).   -If you are vomiting and/or have diarrhea.      How Do I Test for Ketones in My Urine?   Dip a test strip into a sample of your urine. Compare to the color chart that comes with the ketone strips. You can get these strips at your pharmacy without a prescription.  Make sure to read package instructions on proper storage.    What Do I Do if My Ketone Test Is Positive?   **if you ever have symptoms of DKA such as: stomach pain, nausea or vomiting, trouble breathing/breathing fast or fruity smelling breath along with any amount of ketones, report directly to your nearest emergency room.    **In all cases, drink a glass of water every hour and DO NOT EXERCISE as this can worsen your ketone level.  **If on an insulin pump, be sure to follow your insulin pump high blood sugar protocol.    On initial check, if your urine ketones are at a trace or small amount:    A. Take your normal correction dose of insulin   B. Check blood glucose and ketones again in 2 to 3 hours  C. If your blood sugar is still over 240mg/dL AND you still have ketones, you need to take additional insulin with your next correction bolus- please contact your doctor.  D. Continue to monitor ketones and blood sugar closely (every 1 to 2 hours) until they normalize.     On initial check, if your urine ketones are at a moderate or large amount:  A. Follow the steps above under #1 and call your doctor IMMEDIATELY or report to the nearest emergency room.  You will need  advice on how much extra insulin to take, based on your situation.

## 2025-05-20 NOTE — Clinical Note
5/20/2025         RE: Galdino Nelson  [address]  [facility] [address]        Dear Colleague,    Thank you for referring your patient, Galdino Nelson, to the [facility] [place]. Please see a copy of my visit note below.    Galdino Nelson is seen for diabetes education follow up.  Our last visit was on 4/29/25.  I gave more insulin with boluses.    Diabetes Medication(s)       Insulin       NOVOLOG RELION 100 UNIT/ML vial Inj 50 unit(s) subcutaneous daily via the omnipod disposable pump                  CGM summary, last 2 weeks:  Avg  mg/dL  GMI NA but around 8%  Time in Range (TIR)  mg/dL is 52%  0% low under 70 mg/dL (0% less than 54)  30% high 181-250 mg/dL  18% very high over 250 mg/dL  Glucose trends show: highs after breakfast, supper and overnight  Daily sensor glucose data shows: ***    Omnipod Insulin Pump Statistics:  Total Daily Dose (TDD): 39.2 units, with 40% bolus and 60% basal  Bolus overrides per day: 0% or 0 boluses  Automated mode basal of 23.7 units vs manual mode basal total of 20.2 units  Average number of carbs entered daily: 132.3 grams   Time spent in automated mode 75 %  Time CGM active 61.7%     ***      1.    Omnipod insulin pump settings, including changes made today:     Basal rates (units/hour):  12am   1   5am    0.8      Insulin to carb ratios (grams/unit):  12am    14   4am    8.8  6pm     8.8     Insulin sensitivity/correction (mg/dL/unit):  12am    50     Active insulin time: 3 hours     Blood glucose target range/correction threshold:  12am    110 - 110     Insulin Pump Back Up Plan:     If your insulin pump fails or has an issue, the first thing you should do is call the pump company and have them troubleshoot.              *Medtronic 24-hour Customer Support [phone]              *[employer] 24-hour Customer Support [phone]              *[employer] (Omnipod) 24-hour Customer Support [phone]              *[employer] ([facility]) 24-hour Customer Support [phone]     If they determine you need a new pump, you will have to go back to using insulin injections until your new pump arrives.  You will need either insulin pen(s) and pen needles and/or insulin syringes to do injections if your pump is not working.  You can purchase pen needles and insulin syringes at any pharmacy without a prescription.  Just ask at the pharmacy counter.     With your long acting insulin, take 20 units once daily   -these insulins could also be called Lantus, Basaglar, Semglee, Rezvoglar (insulin glargine), Levemir (insulin detemir), Toujeo (insulin glargine U300), Tresiba (insulin degludec).  -If you do not have a current long acting prescription and you are unable to reach your doctor to have one sent to the pharmacy, then you can buy a similar long acting insulin called NPH over the counter with no prescription needed at around $25 a vial from any pharmacy.  If you choose this option, you need to take 1/2 the dose above every 12 hours.  Your dose would be 10 units twice a day.     With your rapid acting insulin Novolog, take 1 unit for every 10 grams of carbohydrate and to correct high blood sugars, take 1 unit for every 55 over 150.  -these insulins could be called Novolog, Merilog, Fiasp (insulin aspart), Humalog, Admelog, Lyumjev (insulin lispro U100 or U200), or Apidra (insulin glulisine).     If you don't have long acting, you must take corrections every 3 -4 hours with your rapid acting insulin to keep your blood sugars under control.  This includes overnight.       **If you have any questions regarding this information or need clarification, please call Candice Sanabria's office.      Galdino Nelson is seen for diabetes education follow up.  Our last visit was on 4/29/25.  I gave more insulin with boluses.    Diabetes Medication(s)       Insulin       NOVOLOG RELION 100 UNIT/ML vial Inj 50 unit(s) subcutaneous daily via the omnipod  disposable pump                  CGM summary, last 2 weeks:  Avg  mg/dL  GMI NA but around 8%  Time in Range (TIR)  mg/dL is 52%  0% low under 70 mg/dL (0% less than 54)  30% high 181-250 mg/dL  18% very high over 250 mg/dL  Glucose trends show: highs after breakfast, supper and overnight  Daily sensor glucose data shows: ***    Omnipod Insulin Pump Statistics:  Total Daily Dose (TDD): 39.2 units, with 40% bolus and 60% basal  Bolus overrides per day: 0% or 0 boluses  Automated mode basal of 23.7 units vs manual mode basal total of 20.2 units  Average number of carbs entered daily: 132.3 grams   Time spent in automated mode 75 %  Time CGM active 61.7%     ***       More insulin given with carbs and corrections.  Continue to focus on getting your carb boluses in 10-15 minutes before you eat and try to correct when high.  Stay in automation at all times- check your home screen when bolusing to ensure it says automation.  Don't overtreat your lows.  When near low, only 5 to 8g of carb is needed.  Glucagon (Gvoke pens) and ketone sticks ordered from [facility].  Testing supplies and insulin vial sent to [facility] per your request.    Omnipod insulin pump settings, including changes made today:     Basal rates (units/hour):  12am   1   5am    0.8      Insulin to carb ratios (grams/unit):  12am    12   4am    7.8  6pm     7.8     Insulin sensitivity/correction (mg/dL/unit):  12am    45     Active insulin time: 3 hours     Blood glucose target range/correction threshold:  12am    110 - 110     Insulin Pump Back Up Plan:     If your insulin pump fails or has an issue, the first thing you should do is call the pump company and have them troubleshoot.              *Medtr[employer] 24-hour Customer Support [phone]              *[employer] 24-hour Customer Support [phone]              *[employer] (Omnipod) 24-hour Customer Support [phone]              *[employer] ([employer] 24-hour Customer Support  [phone]     If they determine you need a new pump, you will have to go back to using insulin injections until your new pump arrives.  You will need either insulin pen(s) and pen needles and/or insulin syringes to do injections if your pump is not working.  You can purchase pen needles and insulin syringes at any pharmacy without a prescription.  Just ask at the pharmacy counter.     With your long acting insulin, take 20 units once daily   -these insulins could also be called Lantus, Basaglar, Semglee, Rezvoglar (insulin glargine), Levemir (insulin detemir), Toujeo (insulin glargine U300), Tresiba (insulin degludec).  -If you do not have a current long acting prescription and you are unable to reach your doctor to have one sent to the pharmacy, then you can buy a similar long acting insulin called NPH over the counter with no prescription needed at around $25 a vial from any pharmacy.  If you choose this option, you need to take 1/2 the dose above every 12 hours.  Your dose would be 10 units twice a day.     With your rapid acting insulin Novolog, take 1 unit for every 8 grams of carbohydrate and to correct high blood sugars, take 1 unit for every 50 over 150.  -these insulins could be called Novolog, Merilog, Fiasp (insulin aspart), Humalog, Admelog, Lyumjev (insulin lispro U100 or U200), or Apidra (insulin glulisine).     If you don't have long acting, you must take corrections every 3 -4 hours with your rapid acting insulin to keep your blood sugars under control.  This includes overnight.       **If you have any questions regarding this information or need clarification, please call Candice Sanabria's office.

## 2025-06-09 ENCOUNTER — TELEPHONE (OUTPATIENT)
Dept: FAMILY MEDICINE | Facility: OTHER | Age: 65
End: 2025-06-09
Payer: COMMERCIAL

## 2025-06-09 NOTE — TELEPHONE ENCOUNTER
Patient Quality Outreach    Patient is due for the following:   Hypertension -  Hypertension follow-up visit  Physical Annual Wellness Visit      Topic Date Due    Zoster (Shingles) Vaccine (1 of 2) Never done    COVID-19 Vaccine (1 - 2024-25 season) Never done    Pneumococcal Vaccine (3 of 3 - PCV20 or PCV21) 11/08/2024       Action(s) Taken:   Schedule a Annual Wellness Visit    Type of outreach:    Sent [employer] message.    Questions for provider review:    None         Yolanda Klein  Chart routed to None.

## 2025-07-06 ENCOUNTER — HEALTH MAINTENANCE LETTER (OUTPATIENT)
Age: 65
End: 2025-07-06

## 2025-07-15 ENCOUNTER — LAB (OUTPATIENT)
Dept: LAB | Facility: CLINIC | Age: 65
End: 2025-07-15
Payer: COMMERCIAL

## 2025-07-15 DIAGNOSIS — E78.2 MIXED HYPERLIPIDEMIA: ICD-10-CM

## 2025-07-15 DIAGNOSIS — I25.10 CAD IN NATIVE ARTERY: ICD-10-CM

## 2025-07-15 LAB
CHOLEST SERPL-MCNC: 122 MG/DL
FASTING STATUS PATIENT QL REPORTED: YES
HDLC SERPL-MCNC: 62 MG/DL
LDLC SERPL CALC-MCNC: 47 MG/DL
NONHDLC SERPL-MCNC: 60 MG/DL
TRIGL SERPL-MCNC: 67 MG/DL

## 2025-07-15 PROCEDURE — 36415 COLL VENOUS BLD VENIPUNCTURE: CPT

## 2025-07-17 DIAGNOSIS — I25.10 CAD IN NATIVE ARTERY: ICD-10-CM

## 2025-07-17 DIAGNOSIS — E78.2 MIXED HYPERLIPIDEMIA: ICD-10-CM

## 2025-07-17 DIAGNOSIS — I10 ESSENTIAL HYPERTENSION: Primary | ICD-10-CM

## 2025-07-18 ENCOUNTER — OFFICE VISIT (OUTPATIENT)
Dept: FAMILY MEDICINE | Facility: CLINIC | Age: 65
End: 2025-07-18
Payer: COMMERCIAL

## 2025-07-18 VITALS
HEART RATE: 81 BPM | TEMPERATURE: 98.7 F | BODY MASS INDEX: 27.7 KG/M2 | OXYGEN SATURATION: 99 % | SYSTOLIC BLOOD PRESSURE: 136 MMHG | RESPIRATION RATE: 16 BRPM | DIASTOLIC BLOOD PRESSURE: 80 MMHG | HEIGHT: 70 IN | WEIGHT: 193.5 LBS

## 2025-07-18 DIAGNOSIS — J44.9 CHRONIC OBSTRUCTIVE PULMONARY DISEASE, UNSPECIFIED COPD TYPE (H): ICD-10-CM

## 2025-07-18 DIAGNOSIS — Z87.891 PERSONAL HISTORY OF TOBACCO USE: ICD-10-CM

## 2025-07-18 DIAGNOSIS — I10 ESSENTIAL HYPERTENSION: ICD-10-CM

## 2025-07-18 DIAGNOSIS — R07.9 CHEST PAIN, UNSPECIFIED TYPE: ICD-10-CM

## 2025-07-18 DIAGNOSIS — I25.10 CAD IN NATIVE ARTERY: ICD-10-CM

## 2025-07-18 DIAGNOSIS — Z23 NEED FOR VACCINATION: ICD-10-CM

## 2025-07-18 DIAGNOSIS — Z86.0100 HISTORY OF COLONIC POLYPS: ICD-10-CM

## 2025-07-18 DIAGNOSIS — E10.65 UNCONTROLLED TYPE 1 DIABETES MELLITUS WITH HYPERGLYCEMIA (H): ICD-10-CM

## 2025-07-18 DIAGNOSIS — E78.2 MIXED HYPERLIPIDEMIA: ICD-10-CM

## 2025-07-18 DIAGNOSIS — F17.200 NICOTINE DEPENDENCE, UNCOMPLICATED, UNSPECIFIED NICOTINE PRODUCT TYPE: ICD-10-CM

## 2025-07-18 DIAGNOSIS — F17.200 TOBACCO USE DISORDER: ICD-10-CM

## 2025-07-18 DIAGNOSIS — Z96.41 INSULIN PUMP STATUS: ICD-10-CM

## 2025-07-18 DIAGNOSIS — Z00.00 ENCOUNTER FOR MEDICARE ANNUAL WELLNESS EXAM: Primary | ICD-10-CM

## 2025-07-18 DIAGNOSIS — F41.9 ANXIETY: ICD-10-CM

## 2025-07-18 DIAGNOSIS — Z12.5 SCREENING FOR PROSTATE CANCER: ICD-10-CM

## 2025-07-18 DIAGNOSIS — I48.0 PAROXYSMAL A-FIB (H): ICD-10-CM

## 2025-07-18 LAB
ALBUMIN SERPL BCG-MCNC: 4.1 G/DL (ref 3.5–5.2)
ALP SERPL-CCNC: 83 U/L (ref 40–150)
ALT SERPL W P-5'-P-CCNC: 15 U/L (ref 0–70)
ANION GAP SERPL CALCULATED.3IONS-SCNC: 9 MMOL/L (ref 7–15)
AST SERPL W P-5'-P-CCNC: 15 U/L (ref 0–45)
BILIRUB SERPL-MCNC: 1 MG/DL
BUN SERPL-MCNC: 17.3 MG/DL (ref 8–23)
CALCIUM SERPL-MCNC: 9.8 MG/DL (ref 8.8–10.4)
CHLORIDE SERPL-SCNC: 98 MMOL/L (ref 98–107)
CREAT SERPL-MCNC: 1.04 MG/DL (ref 0.67–1.17)
EGFRCR SERPLBLD CKD-EPI 2021: 80 ML/MIN/1.73M2
ERYTHROCYTE [DISTWIDTH] IN BLOOD BY AUTOMATED COUNT: 13.4 % (ref 10–15)
EST. AVERAGE GLUCOSE BLD GHB EST-MCNC: 189 MG/DL
GLUCOSE SERPL-MCNC: 407 MG/DL (ref 70–99)
HBA1C MFR BLD: 8.2 %
HCO3 SERPL-SCNC: 27 MMOL/L (ref 22–29)
HCT VFR BLD AUTO: 45.6 % (ref 40–53)
HGB BLD-MCNC: 15.4 G/DL (ref 13.3–17.7)
MCH RBC QN AUTO: 29.5 PG (ref 26.5–33)
MCHC RBC AUTO-ENTMCNC: 33.8 G/DL (ref 31.5–36.5)
MCV RBC AUTO: 87 FL (ref 78–100)
PLATELET # BLD AUTO: 142 10E3/UL (ref 150–450)
POTASSIUM SERPL-SCNC: 5.1 MMOL/L (ref 3.4–5.3)
PROT SERPL-MCNC: 6.9 G/DL (ref 6.4–8.3)
PSA SERPL DL<=0.01 NG/ML-MCNC: 3.23 NG/ML (ref 0–4.5)
RBC # BLD AUTO: 5.22 10E6/UL (ref 4.4–5.9)
SODIUM SERPL-SCNC: 134 MMOL/L (ref 135–145)
WBC # BLD AUTO: 7.7 10E3/UL (ref 4–11)

## 2025-07-18 PROCEDURE — 36415 COLL VENOUS BLD VENIPUNCTURE: CPT | Performed by: STUDENT IN AN ORGANIZED HEALTH CARE EDUCATION/TRAINING PROGRAM

## 2025-07-18 PROCEDURE — 85027 COMPLETE CBC AUTOMATED: CPT | Performed by: STUDENT IN AN ORGANIZED HEALTH CARE EDUCATION/TRAINING PROGRAM

## 2025-07-18 PROCEDURE — 83036 HEMOGLOBIN GLYCOSYLATED A1C: CPT | Performed by: STUDENT IN AN ORGANIZED HEALTH CARE EDUCATION/TRAINING PROGRAM

## 2025-07-18 PROCEDURE — 80053 COMPREHEN METABOLIC PANEL: CPT | Performed by: STUDENT IN AN ORGANIZED HEALTH CARE EDUCATION/TRAINING PROGRAM

## 2025-07-18 PROCEDURE — G0103 PSA SCREENING: HCPCS | Performed by: STUDENT IN AN ORGANIZED HEALTH CARE EDUCATION/TRAINING PROGRAM

## 2025-07-18 RX ORDER — VARENICLINE TARTRATE 1 MG/1
1 TABLET, FILM COATED ORAL 2 TIMES DAILY
Qty: 180 TABLET | Refills: 0 | Status: SHIPPED | OUTPATIENT
Start: 2025-08-17

## 2025-07-18 RX ORDER — VARENICLINE TARTRATE 0.5 MG/1
TABLET, FILM COATED ORAL
Qty: 95 TABLET | Refills: 0 | Status: SHIPPED | OUTPATIENT
Start: 2025-07-18

## 2025-07-18 SDOH — HEALTH STABILITY: PHYSICAL HEALTH: ON AVERAGE, HOW MANY DAYS PER WEEK DO YOU ENGAGE IN MODERATE TO STRENUOUS EXERCISE (LIKE A BRISK WALK)?: 2 DAYS

## 2025-07-18 ASSESSMENT — PAIN SCALES - GENERAL: PAINLEVEL_OUTOF10: NO PAIN (0)

## 2025-07-18 ASSESSMENT — SOCIAL DETERMINANTS OF HEALTH (SDOH): HOW OFTEN DO YOU GET TOGETHER WITH FRIENDS OR RELATIVES?: MORE THAN THREE TIMES A WEEK

## 2025-07-18 NOTE — NURSING NOTE
Prior to immunization administration, verified patients identity using patient s name and date of birth. Please see Immunization Activity for additional information.     Screening Questionnaire for Adult Immunization    Are you sick today?   No   Do you have allergies to medications, food, a vaccine component or latex?   No   Have you ever had a serious reaction after receiving a vaccination?   No   Do you have a long-term health problem with heart, lung, kidney, or metabolic disease (e.g., diabetes), asthma, a blood disorder, no spleen, complement component deficiency, a cochlear implant, or a spinal fluid leak?  Are you on long-term aspirin therapy?   Yes   Do you have cancer, leukemia, HIV/AIDS, or any other immune system problem?   No   Do you have a parent, brother, or sister with an immune system problem?   No   In the past 3 months, have you taken medications that affect  your immune system, such as prednisone, other steroids, or anticancer drugs; drugs for the treatment of rheumatoid arthritis, Crohn s disease, or psoriasis; or have you had radiation treatments?   No   Have you had a seizure, or a brain or other nervous system problem?   No   During the past year, have you received a transfusion of blood or blood    products, or been given immune (gamma) globulin or antiviral drug?   No   For women: Are you pregnant or is there a chance you could become       pregnant during the next month?   No   Have you received any vaccinations in the past 4 weeks?   No     Immunization questionnaire was positive for at least one answer.  Notified Dr Carpio.      Patient instructed to remain in clinic for 15 minutes afterwards, and to report any adverse reactions.     Screening performed by Faith Chung MA on 7/18/2025 at 9:40 AM.

## 2025-07-18 NOTE — PATIENT INSTRUCTIONS
Patient Education   Preventive Care Advice   This is general advice given by our system to help you stay healthy. However, your care team may have specific advice just for you. Please talk to your care team about your preventive care needs.  Nutrition  Eat 5 or more servings of fruits and vegetables each day.  Try wheat bread, brown rice and whole grain pasta (instead of white bread, rice, and pasta).  Get enough calcium and vitamin D. Check the label on foods and aim for 100% of the RDA (recommended daily allowance).  Lifestyle  Exercise at least 150 minutes each week  (30 minutes a day, 5 days a week).  Do muscle strengthening activities 2 days a week. These help control your weight and prevent disease.  No smoking.  Wear sunscreen to prevent skin cancer.  Have a dental exam and cleaning every 6 months.  Yearly exams  See your health care team every year to talk about:  Any changes in your health.  Any medicines your care team has prescribed.  Preventive care, family planning, and ways to prevent chronic diseases.  Shots (vaccines)   HPV shots (up to age 26), if you've never had them before.  Hepatitis B shots (up to age 59), if you've never had them before.  COVID-19 shot: Get this shot when it's due.  Flu shot: Get a flu shot every year.  Tetanus shot: Get a tetanus shot every 10 years.  Pneumococcal, hepatitis A, and RSV shots: Ask your care team if you need these based on your risk.  Shingles shot (for age 50 and up)  General health tests  Diabetes screening:  Starting at age 35, Get screened for diabetes at least every 3 years.  If you are younger than age 35, ask your care team if you should be screened for diabetes.  Cholesterol test: At age 39, start having a cholesterol test every 5 years, or more often if advised.  Bone density scan (DEXA): At age 50, ask your care team if you should have this scan for osteoporosis (brittle bones).  Hepatitis C: Get tested at least once in your life.  STIs (sexually  transmitted infections)  Before age 24: Ask your care team if you should be screened for STIs.  After age 24: Get screened for STIs if you're at risk. You are at risk for STIs (including HIV) if:  You are sexually active with more than one person.  You don't use condoms every time.  You or a partner was diagnosed with a sexually transmitted infection.  If you are at risk for HIV, ask about PrEP medicine to prevent HIV.  Get tested for HIV at least once in your life, whether you are at risk for HIV or not.  Cancer screening tests  Cervical cancer screening: If you have a cervix, begin getting regular cervical cancer screening tests starting at age 21.  Breast cancer scan (mammogram): If you've ever had breasts, begin having regular mammograms starting at age 40. This is a scan to check for breast cancer.  Colon cancer screening: It is important to start screening for colon cancer at age 45.  Have a colonoscopy test every 10 years (or more often if you're at risk) Or, ask your provider about stool tests like a FIT test every year or Cologuard test every 3 years.  To learn more about your testing options, visit:   .  For help making a decision, visit:   https://bit.ly/cg28229.  Prostate cancer screening test: If you have a prostate, ask your care team if a prostate cancer screening test (PSA) at age 55 is right for you.  Lung cancer screening: If you are a current or former smoker ages 50 to 80, ask your care team if ongoing lung cancer screenings are right for you.  For informational purposes only. Not to replace the advice of your health care provider. Copyright   2023 [facility]. All rights reserved. Clinically reviewed by the [facility]. [employer] 552971 - REV 01/24.  Preventing Falls: Care Instructions  Injuries and health problems such as trouble walking or poor eyesight can increase your risk of falling. So can some medicines. But there are things you can do to help  "prevent falls. You can exercise to get stronger. You can also arrange your home to make it safer.    Talk to your doctor about the medicines you take. Ask if any of them increase the risk of falls and whether they can be changed or stopped.   Try to exercise regularly. It can help improve your strength and balance. This can help lower your risk of falling.         Practice fall safety and prevention.   Wear low-heeled shoes that fit well and give your feet good support. Talk to your doctor if you have foot problems that make this hard.  Carry a cellphone or wear a medical alert device that you can use to call for help.  Use stepladders instead of chairs to reach high objects. Don't climb if you're at risk for falls. Ask for help, if needed.  Wear the correct eyeglasses, if you need them.        Make your home safer.   Remove rugs, cords, clutter, and furniture from walkways.  Keep your house well lit. Use night-lights in hallways and bathrooms.  Install and use sturdy handrails on stairways.  Wear nonskid footwear, even inside. Don't walk barefoot or in socks without shoes.        Be safe outside.   Use handrails, curb cuts, and ramps whenever possible.  Keep your hands free by using a shoulder bag or backpack.  Try to walk in well-lit areas. Watch out for uneven ground, changes in pavement, and debris.  Be careful in the winter. Walk on the grass or gravel when sidewalks are slippery. Use de-icer on steps and walkways. Add non-slip devices to shoes.    Put grab bars and nonskid mats in your shower or tub and near the toilet. Try to use a shower chair or bath bench when bathing.   Get into a tub or shower by putting in your weaker leg first. Get out with your strong side first. Have a phone or medical alert device in the bathroom with you.   Where can you learn more?  Go to https://www.iMICROQwise.net/patiented  Enter G117 in the search box to learn more about \"Preventing Falls: Care Instructions.\"  Current as of: " July 31, 2024  Content Version: 14.5    5664-2575 [employer].   Care instructions adapted under license by your healthcare professional. If you have questions about a medical condition or this instruction, always ask your healthcare professional. [employer] disclaims any warranty or liability for your use of this information.    Hearing Loss: Care Instructions  Overview     Hearing loss is a sudden or slow decrease in how well you hear. It can range from slight to profound. Permanent hearing loss can occur with aging. It also can happen when you are exposed long-term to loud noise. Examples include listening to loud music, riding motorcycles, or being around other loud machines.  Hearing loss can affect your work and home life. It can make you feel lonely or depressed. You may feel that you have lost your independence. But hearing aids and other devices can help you hear better and feel connected to others.  Follow-up care is a key part of your treatment and safety. Be sure to make and go to all appointments, and call your doctor if you are having problems. It's also a good idea to know your test results and keep a list of the medicines you take.  How can you care for yourself at home?  Avoid loud noises whenever possible. This helps keep your hearing from getting worse.  Always wear hearing protection around loud noises.  Wear a hearing aid as directed.  A professional can help you pick a hearing aid that will work best for you.  You can also get hearing aids over the counter for mild to moderate hearing loss.  Have hearing tests as your doctor suggests. They can show whether your hearing has changed. Your hearing aid may need to be adjusted.  Use other devices as needed. These may include:  Telephone amplifiers and hearing aids that can connect to a television, stereo, radio, or microphone.  Devices that use lights or vibrations. These alert you to the doorbell, a ringing telephone, or a  "baby monitor.  Television closed-captioning. This shows the words at the bottom of the screen. Most new TVs can do this.  TTY (text telephone). This lets you type messages back and forth on the telephone instead of talking or listening. These devices are also called TDD. When messages are typed on the keyboard, they are sent over the phone line to a receiving TTY. The message is shown on a monitor.  Use text messaging, social media, and email if it is hard for you to communicate by telephone.  Try to learn a listening technique called speechreading. It is not lipreading. You pay attention to people's gestures, expressions, posture, and tone of voice. These clues can help you understand what a person is saying. Face the person you are talking to, and have them face you. Make sure the lighting is good. You need to see the other person's face clearly.  Think about counseling if you need help to adjust to your hearing loss.  When should you call for help?  Watch closely for changes in your health, and be sure to contact your doctor if:    You think your hearing is getting worse.     You have new symptoms, such as dizziness or nausea.   Where can you learn more?  Go to https://www.[employer].net/patiented  Enter R798 in the search box to learn more about \"Hearing Loss: Care Instructions.\"  Current as of: October 27, 2024  Content Version: 14.5 2024-2025 [employer].   Care instructions adapted under license by your healthcare professional. If you have questions about a medical condition or this instruction, always ask your healthcare professional. [employer] disclaims any warranty or liability for your use of this information.    Learning About Stress  What is stress?     Stress is your body's response to a hard situation. Your body can have a physical, emotional, or mental response. Stress is a fact of life for most people, and it affects everyone differently. What causes stress for you may not " be stressful for someone else.  A lot of things can cause stress. You may feel stress when you go on a job interview, take a test, or run a race. This kind of short-term stress is normal and even useful. It can help you if you need to work hard or react quickly. For example, stress can help you finish an important job on time.  Long-term stress is caused by ongoing stressful situations or events. Examples of long-term stress include long-term health problems, ongoing problems at work, or conflicts in your family. Long-term stress can harm your health.  How does stress affect your health?  When you are stressed, your body responds as though you are in danger. It makes hormones that speed up your heart, make you breathe faster, and give you a burst of energy. This is called the fight-or-flight stress response. If the stress is over quickly, your body goes back to normal and no harm is done.  But if stress happens too often or lasts too long, it can have bad effects. Long-term stress can make you more likely to get sick, and it can make symptoms of some diseases worse. If you tense up when you are stressed, you may develop neck, shoulder, or low back pain. Stress is linked to high blood pressure and heart disease.  Stress also harms your emotional health. It can make you gillespie, tense, or depressed. Your relationships may suffer, and you may not do well at work or school.  What can you do to manage stress?  You can try these things to help manage stress:   Do something active. Exercise or activity can help reduce stress. Walking is a great way to get started. Even everyday activities such as housecleaning or yard work can help.  Try yoga or steven chi. These techniques combine exercise and meditation. You may need some training at first to learn them.  Do something you enjoy. For example, listen to music or go to a movie. Practice your hobby or do volunteer work.  Meditate. This can help you relax, because you are not  "worrying about what happened before or what may happen in the future.  Do guided imagery. Imagine yourself in any setting that helps you feel calm. You can use online videos, books, or a teacher to guide you.  Do breathing exercises. For example:  From a standing position, bend forward from the waist with your knees slightly bent. Let your arms dangle close to the floor.  Breathe in slowly and deeply as you return to a standing position. Roll up slowly and lift your head last.  Hold your breath for just a few seconds in the standing position.  Breathe out slowly and bend forward from the waist.  Let your feelings out. Talk, laugh, cry, and express anger when you need to. Talking with supportive friends or family, a counselor, or a rhianna leader about your feelings is a healthy way to relieve stress. Avoid discussing your feelings with people who make you feel worse.  Write. It may help to write about things that are bothering you. This helps you find out how much stress you feel and what is causing it. When you know this, you can find better ways to cope.  What can you do to prevent stress?  You might try some of these things to help prevent stress:  Manage your time. This helps you find time to do the things you want and need to do.  Get enough sleep. Your body recovers from the stresses of the day while you are sleeping.  Get support. Your family, friends, and community can make a difference in how you experience stress.  Limit your news feed. Avoid or limit time on social media or news that may make you feel stressed.  Do something active. Exercise or activity can help reduce stress. Walking is a great way to get started.  Where can you learn more?  Go to https://www.[employer]ui.net/patiented  Enter N032 in the search box to learn more about \"Learning About Stress.\"  Current as of: October 24, 2024  Content Version: 14.5 2024-2025 [employer] [facility] [employer].   Care instructions adapted under license by your " healthcare professional. If you have questions about a medical condition or this instruction, always ask your healthcare professional. [employer] disclaims any warranty or liability for your use of this information.    Learning About Sleeping Well  What does sleeping well mean?     Sleeping well means getting enough sleep to feel good and stay healthy. How much sleep is enough varies among people.  The number of hours you sleep and how you feel when you wake up are both important. If you do not feel refreshed, you probably need more sleep. Another sign of not getting enough sleep is feeling tired during the day.  Experts recommend that adults get at least 7 or more hours of sleep per day. Children and older adults need more sleep.  Why is getting enough sleep important?  Getting enough quality sleep is a basic part of good health. When your sleep suffers, your physical health, mood, and your thoughts can suffer too. You may find yourself feeling more grumpy or stressed. Not getting enough sleep also can lead to serious problems, including injury, accidents, anxiety, and depression.  What might cause poor sleeping?  Many things can cause sleep problems, including:  Changes to your sleep schedule.  Stress. Stress can be caused by fear about a single event, such as giving a speech. Or you may have ongoing stress, such as worry about work or school.  Depression, anxiety, and other mental or emotional conditions.  Changes in your sleep habits or surroundings. This includes changes that happen where you sleep, such as noise, light, or sleeping in a different bed. It also includes changes in your sleep pattern, such as having jet lag or working a late shift.  Health problems, such as pain, breathing problems, and restless legs syndrome.  Lack of regular exercise.  Using alcohol, nicotine, or caffeine before bed.  How can you help yourself?  Here are some tips that may help you sleep more soundly and wake up  "feeling more refreshed.  Your sleeping area   Use your bedroom only for sleeping and sex. A bit of light reading may help you fall asleep. But if it doesn't, do your reading elsewhere in the house. Try not to use your TV, computer, smartphone, or tablet while you are in bed.  Be sure your bed is big enough to stretch out comfortably, especially if you have a sleep partner.  Keep your bedroom quiet, dark, and cool. Use curtains, blinds, or a sleep mask to block out light. To block out noise, use earplugs, soothing music, or a \"white noise\" machine.  Your evening and bedtime routine   Create a relaxing bedtime routine. You might want to take a warm shower or bath, or listen to soothing music.  Go to bed at the same time every night. And get up at the same time every morning, even if you feel tired.  What to avoid   Limit caffeine (coffee, tea, caffeinated sodas) during the day, and don't have any for at least 6 hours before bedtime.  Avoid drinking alcohol before bedtime. Alcohol can cause you to wake up more often during the night.  Try not to smoke or use tobacco, especially in the evening. Nicotine can keep you awake.  Limit naps during the day, especially close to bedtime.  Avoid lying in bed awake for too long. If you can't fall asleep or if you wake up in the middle of the night and can't get back to sleep within about 20 minutes, get out of bed and go to another room until you feel sleepy.  Avoid taking medicine right before bed that may keep you awake or make you feel hyper or energized. Your doctor can tell you if your medicine may do this and if you can take it earlier in the day.  If you can't sleep   Imagine yourself in a peaceful, pleasant scene. Focus on the details and feelings of being in a place that is relaxing.  Get up and do a quiet or boring activity until you feel sleepy.  Avoid drinking any liquids before going to bed to help prevent waking up often to use the bathroom.  Where can you learn " "more?  Go to https://www.[employer].net/patiented  Enter J942 in the search box to learn more about \"Learning About Sleeping Well.\"  Current as of: July 31, 2024  Content Version: 14.5 2024-2025 [employer].   Care instructions adapted under license by your healthcare professional. If you have questions about a medical condition or this instruction, always ask your healthcare professional. [employer] disclaims any warranty or liability for your use of this information.    Chronic Pain: Care Instructions  Your Care Instructions     Chronic pain is pain that lasts a long time (months or even years) and may or may not have a clear cause. It is different from acute pain, which usually does have a clear cause--like an injury or illness--and gets better over time. Chronic pain:  Lasts over time but may vary from day to day.  Does not go away despite efforts to end it.  May disrupt your sleep and lead to fatigue.  May cause depression or anxiety.  May make your muscles tense, causing more pain.  Can disrupt your work, hobbies, home life, and relationships with friends and family.  Chronic pain is a very real condition. It is not just in your head. Treatment can help and usually includes several methods used together, such as medicines, physical therapy, exercise, and other treatments. Learning how to relax and changing negative thought patterns can also help you cope.  Chronic pain is complex. Taking an active role in your treatment will help you better manage your pain. Tell your doctor if you have trouble dealing with your pain. You may have to try several things before you find what works best for you.  Follow-up care is a key part of your treatment and safety. Be sure to make and go to all appointments, and call your doctor if you are having problems. It's also a good idea to know your test results and keep a list of the medicines you take.  How can you care for yourself at home?  Pace " yourself. Break up large jobs into smaller tasks. Save harder tasks for days when you have less pain, or go back and forth between hard tasks and easier ones. Take rest breaks.  Relax, and reduce stress. Relaxation techniques such as deep breathing or meditation can help.  Keep moving. Gentle, daily exercise can help reduce pain over the long run. Try low- or no-impact exercises such as walking, swimming, and stationary biking. Do stretches to stay flexible.  Try heat, cold packs, and massage.  Get enough sleep. Chronic pain can make you tired and drain your energy. Talk with your doctor if you have trouble sleeping because of pain.  Think positive. Your thoughts can affect your pain level. Do things that you enjoy to distract yourself when you have pain instead of focusing on the pain. See a movie, read a book, listen to music, or spend time with a friend.  If you think you are depressed, talk to your doctor about treatment.  Keep a daily pain diary. Record how your moods, thoughts, sleep patterns, activities, and medicine affect your pain. You may find that your pain is worse during or after certain activities or when you are feeling a certain emotion. Having a record of your pain can help you and your doctor find the best ways to treat your pain.  Take pain medicines exactly as directed.  If the doctor gave you a prescription medicine for pain, take it as prescribed.  If you are not taking a prescription pain medicine, ask your doctor if you can take an over-the-counter medicine.  Reducing constipation caused by pain medicine  Talk to your doctor about a laxative. If a laxative doesn't work, your doctor may suggest a prescription medicine.  Include fruits, vegetables, beans, and whole grains in your diet each day. These foods are high in fiber.  If your doctor recommends it, get more exercise. Walking is a good choice. Bit by bit, increase the amount you walk every day. Try for at least 30 minutes on most days of  "the week.  Schedule time each day for a bowel movement. A daily routine may help. Take your time and do not strain when having a bowel movement.  When should you call for help?   Call your doctor now or seek immediate medical care if:    Your pain gets worse or is out of control.     You feel down or blue, or you do not enjoy things like you once did. You may be depressed, which is common in people with chronic pain. Depression can be treated.     You have vomiting or cramps for more than 2 hours.   Watch closely for changes in your health, and be sure to contact your doctor if:    You cannot sleep because of pain.     You are very worried or anxious about your pain.     You have trouble taking your pain medicine.     You have any concerns about your pain medicine.     You have trouble with bowel movements, such as:  No bowel movement in 3 days.  Blood in the anal area, in your stool, or on the toilet paper.  Diarrhea for more than 24 hours.   Where can you learn more?  Go to https://www.iNEWiT.net/patiented  Enter N004 in the search box to learn more about \"Chronic Pain: Care Instructions.\"  Current as of: July 31, 2024  Content Version: 14.5    1614-8942 [employer].   Care instructions adapted under license by your healthcare professional. If you have questions about a medical condition or this instruction, always ask your healthcare professional. [employer] disclaims any warranty or liability for your use of this information.       Lung Cancer Screening   Frequently Asked Questions  If you are at high-risk for lung cancer, getting screened with low-dose computed tomography (LDCT) every year can help save your life. This handout offers answers to some of the most common questions about lung cancer screening. If you have other questions, please call [phone] ([phone]).     What is it?  Lung cancer screening uses special X-ray technology to create an image of your lung " tissue. The exam is quick and easy and takes less than 10 seconds. We don t give you any medicine or use any needles. You can eat before and after the exam. You don t need to change your clothes as long as the clothing on your chest doesn t contain metal. But, you do need to be able to hold your breath for at least 6 seconds during the exam.    What is the goal of lung cancer screening?  The goal of lung cancer screening is to save lives. Many times, lung cancer is not found until a person starts having physical symptoms. Lung cancer screening can help detect lung cancer in the earliest stages when it may be easier to treat.    Who should be screened for lung cancer?  We suggest lung cancer screening for anyone who is at high-risk for lung cancer. You are in the high-risk group if you:      are between the ages of 55 and 79, and    have smoked at least 1 pack of cigarettes a day for 20 or more years, and    still smoke or have quit within the past 15 years.    However, if you have a new cough or shortness of breath, you should talk to your doctor before being screened.    Why does it matter if I have symptoms?  Certain symptoms can be a sign that you have a condition in your lungs that should be checked and treated by your doctor. These symptoms include fever, chest pain, a new or changing cough, shortness of breath that you have never felt before, coughing up blood or unexplained weight loss. Having any of these symptoms can greatly affect the results of lung cancer screening.       Should all smokers get an LDCT lung cancer screening exam?  It depends. Lung cancer screening is for a very specific group of men and women who have a history of heavy smoking over a long period of time (see  Who should be screened for lung cancer  above).  I am in the high-risk group, but have been diagnosed with cancer in the past. Is LDCT lung cancer screening right for me?  In some cases, you should not have LDCT lung screening,  such as when your doctor is already following your cancer with CT scan studies. Your doctor will help you decide if LDCT lung screening is right for you.  Do I need to have a screening exam every year?  Yes. If you are in the high-risk group described earlier, you should get an LDCT lung cancer screening exam every year until you are 79, or are no longer willing or able to undergo screening and possible procedures to diagnose and treat lung cancer.  How effective is LDCT at preventing death from lung cancer?  Studies have shown that LDCT lung cancer screening can lower the risk of death from lung cancer by 20 percent in people who are at high-risk.  What are the risks?  There are some risks and limitations of LDCT lung cancer screening. We want to make sure you understand the risks and benefits, so please let us know if you have any questions. Your doctor may want to talk with you more about these risks.    Radiation exposure: As with any exam that uses radiation, there is a very small increased risk of cancer. The amount of radiation in LDCT is small--about the same amount a person would get from a mammogram. Your doctor orders the exam when he or she feels the potential benefits outweigh the risks.    False negatives: No test is perfect, including LDCT. It is possible that you may have a medical condition, including lung cancer, that is not found during your exam. This is called a false negative result.    False positives and more testing: LDCT very often finds something in the lung that could be cancer, but in fact is not. This is called a false positive result. False positive tests often cause anxiety. To make sure these findings are not cancer, you may need to have more tests. These tests will be done only if you give us permission. Sometimes patients need a treatment that can have side effects, such as a biopsy. For more information on false positives, see  What can I expect from the results?     Findings not  related to lung cancer: Your LDCT exam also takes pictures of areas of your body next to your lungs. In a very small number of cases, the CT scan will show an abnormal finding in one of these areas, such as your kidneys, adrenal glands, liver or thyroid. This finding may not be serious, but you may need more tests. Your doctor can help you decide what other tests you may need, if any.  What can I expect from the results?  About 1 out of 4 LDCT exams will find something that may need more tests. Most of the time, these findings are lung nodules. Lung nodules are very small collections of tissue in the lung. These nodules are very common, and the vast majority--more than 97 percent--are not cancer (benign). Most are normal lymph nodes or small areas of scarring from past infections.  But, if a small lung nodule is found to be cancer, the cancer can be cured more than 90 percent of the time. To know if the nodule is cancer, we may need to get more images before your next yearly screening exam. If the nodule has suspicious features (for example, it is large, has an odd shape or grows over time), we will refer you to a specialist for further testing.  Will my doctor also get the results?  Yes. Your doctor will get a copy of your results.  Is it okay to keep smoking now that there s a cancer screening exam?  No. Tobacco is one of the strongest cancer-causing agents. It causes not only lung cancer, but other cancers and cardiovascular (heart) diseases as well. The damage caused by smoking builds over time. This means that the longer you smoke, the higher your risk of disease. While it is never too late to quit, the sooner you quit, the better.  Where can I find help to quit smoking?  The best way to prevent lung cancer is to stop smoking. If you have already quit smoking, congratulations and keep it up! For help on quitting smoking, please call [facility] at 5-807-QUIT-NOW (1-787.309.5675) or the [employer]  at 1-868.799.5808 to find local resources near you.  One-on-one health coaching:  If you d prefer to work individually with a health care provider on tobacco cessation, we offer:      Medication Therapy Management:  Our specially trained pharmacists work closely with you and your doctor to help you quit smoking.  Call [phone] or [phone] (toll free).

## 2025-07-18 NOTE — PROGRESS NOTES
Preventive Care Visit  [facility]  Rj Carpio MD, Family Medicine  Jul 18, 2025  {Provider  Link to [facility] :[id number]}    Assessment & Plan   Problem List Items Addressed This Visit          Cardiovascular/Peripheral Vascular    Essential hypertension    Paroxysmal A-fib (H)    CAD in native artery       Respiratory/Allergy    Chronic obstructive pulmonary disease (H)    Relevant Medications    varenicline (CHANTIX) 0.5 MG tablet    varenicline (CHANTIX) 1 MG tablet (Start on 8/17/2025)       Endocrine    Hyperlipidemia    Uncontrolled type 1 diabetes mellitus with hyperglycemia (H)    Relevant Orders    Adult Eye  Referral    HEMOGLOBIN A1C    FOOT EXAM (Completed)       Behavioral Health    Tobacco use disorder    Anxiety       Other    Insulin pump status     Other Visit Diagnoses         Encounter for Medicare annual wellness exam    -  Primary      Need for vaccination          Nicotine dependence, uncomplicated, unspecified nicotine product type        Relevant Medications    varenicline (CHANTIX) 0.5 MG tablet    varenicline (CHANTIX) 1 MG tablet (Start on 8/17/2025)    Other Relevant Orders    [place] [facility] Referral    SMOKING CESSATION COUNSELING 3-10 MIN (Completed)      History of colonic polyps          Screening for prostate cancer        Relevant Orders    PSA, screen      Personal history of tobacco use        Relevant Orders    Prof fee: Shared Decision Making for Lung Cancer Screening (Completed)    CT Chest Lung Cancer Scrn Low Dose wo      Chest pain, unspecified type        Relevant Orders    Comprehensive metabolic panel (BMP + Alb, Alk Phos, ALT, AST, Total. Bili, TP)    CBC with platelets    EKG 12-lead complete w/read - Clinics (Completed)           Patient has been advised of split billing requirements and indicates understanding: Yes    The longitudinal plan of care for the diagnosis(es)/condition(s) as documented were addressed during  "this visit. Due to the added complexity in care, I will continue to support Galdino in the subsequent management and with ongoing continuity of care.      BMI  Estimated body mass index is 27.7 kg/m  as calculated from the following:    Height as of this encounter: 1.78 m (5' 10.08\").    Weight as of this encounter: 87.8 kg (193 lb 8 oz).   Weight management plan: Discussed healthy diet and exercise guidelines    Counseling  Appropriate preventive services were addressed with this patient via screening, questionnaire, or discussion as appropriate for fall prevention, nutrition, physical activity, Tobacco-use cessation, social engagement, weight loss and cognition.  Checklist reviewing preventive services available has been given to the patient.  Reviewed patient's diet, addressing concerns and/or questions.   He is at risk for lack of exercise and has been provided with information to increase physical activity for the benefit of his well-being.   The patient was instructed to see the dentist every 6 months.   He is at risk for psychosocial distress and has been provided with information to reduce risk.   Discussed possible causes of fatigue. The patient was provided with written information regarding signs of hearing loss.   I have reviewed Opioid Use Disorder and Substance Use Disorder risk factors and made any needed referrals.   Reviewed preventive health counseling, as reflected in patient instructions       Consider AAA screening for ages 65-75 and > 100 cig smoking history or family history of AAA       Regular exercise       Healthy diet/nutrition       Vision screening       Hearing screening       Alcohol Use        Safe sex practices/STD prevention       Hep C screening for all patients one time for ages 18-79 years       HIV screeninx in teen years, 1x in adult years, and at intervals if high risk       Colorectal cancer screening       Consider prostate cancer screening (age 55-69)       Osteoporosis " prevention/bone health       Consider lung cancer screening for ages 55-80 years (77 for Medicare) and 20 pack-year smoking history        Advance Care Planning      Nell Ahmadi is a 65 year old, presenting for the following:  Wellness Visit and Establish Care        7/18/2025     8:53 AM   Additional Questions   Roomed by Leigha          HPI    Some intermittent chest pain with exertion. Has been going on for the last year and has seen cardiology. Feels winded. Not wheezy or cough.       Advance Care Planning  {The storyboard will display whether the patient has ACP docs on file. Hover over the Code section in the storyboard to access the ACP documents. :511153}  Discussed advance care planning with patient; informed AVS has link to [facility] [employer].        7/18/2025   General Health   How would you rate your overall physical health? (!) FAIR   Feel stress (tense, anxious, or unable to sleep) To some extent   (!) STRESS CONCERN      7/18/2025   Nutrition   Diet: Carbohydrate counting         7/18/2025   Exercise   Days per week of moderate/strenous exercise 2 days   (!) EXERCISE CONCERN      7/18/2025   Social Factors   Frequency of gathering with friends or relatives More than three times a week   Worry food won't last until get money to buy more No   Food not last or not have enough money for food? No   Do you have housing? (Housing is defined as stable permanent housing and does not include staying outside in a car, in a tent, in an abandoned building, in an overnight shelter, or couch-surfing.) Yes   Are you worried about losing your housing? No   Lack of transportation? No   Unable to get utilities (heat,electricity)? No         7/18/2025   Fall Risk   Fallen 2 or more times in the past year? No   Trouble with walking or balance? No   Gait Speed Test (Document in seconds) 3.75    3.75   Gait Speed Test Interpretation Less than or equal to 5.00 seconds - PASS       Multiple values from one day are  sorted in reverse-chronological order          7/18/2025   Activities of Daily Living- Home Safety   Needs help with the following daily activites None of the above   Safety concerns in the home None of the above         7/18/2025   Dental   Dentist two times every year? (!) NO         7/18/2025   Hearing Screening   Hearing concerns? (!) I NEED TO ASK PEOPLE TO SPEAK UP OR REPEAT THEMSELVES.   Would you like a referral for hearing testing? No         7/18/2025   Driving Risk Screening   Patient/family members have concerns about driving No         7/18/2025   General Alertness/Fatigue Screening   Have you been more tired than usual lately? (!) YES         7/18/2025   Urinary Incontinence Screening   Bothered by leaking urine in past 6 months No         Today's PHQ-2 Score:       7/18/2025     9:05 AM   PHQ-2 ( 1999 Pfizer)   PHQ-2 Score Incomplete           7/18/2025   Substance Use   If I could quit smoking, I would Completely agree   I want to quit somking, worry about health affects Completely agree   Willing to make a plan to quit smoking Completely agree   Willing to cut down before quitting Completely agree   Alcohol more than 3/day or more than 7/wk Not Applicable   Do you have a current opioid prescription? (!) YES   How severe/bad is pain from 1 to 10? 3/10   Do you use any other substances recreationally? No   {Provider  Link to Opioid Risk Tool  Assess risk of opioid use disorder :064862}       No data to display              Low Risk (0-3)  Moderate Risk (4-7)  High Risk (>8)  Social History     Tobacco Use    Smoking status: Every Day     Current packs/day: 1.00     Types: Cigarettes    Smokeless tobacco: Never   Vaping Use    Vaping status: Never Used   Substance Use Topics    Alcohol use: Not Currently    Drug use: Not Currently     {Provider  If there are gaps in the social history shown above, please follow the link to update and then refresh the note Link to Social and Substance History  ":150593}  Last PSA: No results found for: \"PSA\"  ASCVD Risk   The ASCVD Risk score (Mingo VERDUGO, et al., 2019) failed to calculate for the following reasons:    Risk score cannot be calculated because patient has a medical history suggesting prior/existing ASCVD    Fracture Risk Assessment Tool  Link to Frax Calculator  Use the information below to complete the Frax calculator  : 1960  Sex: male  Weight (kg): 87.8 kg (actual weight)  Height (cm): 178 cm  Previous Fragility Fracture:  No  History of parent with fractured hip:  No  Current Smoking:  Yes  Patient has been on glucocorticoids for more than 3 months (5mg/day or more): No  Rheumatoid Arthritis on Problem List:  No  Secondary Osteoporosis on Problem List:  No  Consumes 3 or more units of alcohol per day: No  Femoral Neck BMD (g/cm2)  {Link to FRAX Calculated Score Flowsheet  After results are documented in flowsheet, refresh note to pull results into note :375705} {10-year probability of a hip fracture >= 3% or a major osteoporosis-related fracture >= 20% may indicate treatment:465741}     {Provider  REQUIRED FOR AWV Use the storyboard to review patient history, after sections have been marked as reviewed, refresh note to capture documentation:760934}  {Provider   REQUIRED AWV use this link to review and update sexual activity history  after section has been marked as reviewed, refresh note to capture documentation:544315}  Reviewed and updated as needed this visit by Provider                    Past Medical History:   Diagnosis Date    Chronic obstructive pulmonary disease (H) 2011    Diabetes mellitus type 1 (H)      Past Surgical History:   Procedure Laterality Date    COLONOSCOPY N/A 2024    Procedure: COLONOSCOPY, FLEXIBLE, WITH LESION REMOVAL USING SNARE;  Surgeon: Valentino Guerrero DO;  Location:  GI    HERNIA REPAIR      ORTHOPEDIC SURGERY       Current providers sharing in care for this patient " "include:  Patient Care Team:  Kevin Curtis MD as PCP - General (Family Medicine)  Kevin Curtis MD as Assigned PCP  Amina Chambers [facility] as Diabetes Educator (Pharmacist)  Candice Sanabria PA-C as Assigned Endocrinology Provider  Hazel Reaves MD, MD as Assigned Heart and Vascular Provider    The following health maintenance items are reviewed in Epic and correct as of today:  Health Maintenance   Topic Date Due    SPIROMETRY  Never done    COPD ACTION PLAN  Never done    ZOSTER VACCINE (1 of 2) Never done    RSV VACCINE (1 - Risk 60-74 years 1-dose series) Never done    COVID-19 VACCINE (1 - 2024-25 season) Never done    PNEUMOCOCCAL VACCINE 50+ YEARS (3 of 3 - PCV20 or PCV21) 11/08/2024    EYE EXAM  01/29/2025    A1C  07/02/2025    LUNG CANCER SCREENING  07/31/2025    INFLUENZA VACCINE (1) 09/01/2025    NICOTINE/TOBACCO CESSATION COUNSELING Q 1 YR  09/09/2025    BMP  04/02/2026    MICROALBUMIN  04/02/2026    LIPID  07/15/2026    MEDICARE ANNUAL WELLNESS VISIT  07/18/2026    DIABETIC FOOT EXAM  07/18/2026    ANNUAL REVIEW OF HM ORDERS  07/18/2026    FALL RISK ASSESSMENT  07/18/2026    COLORECTAL CANCER SCREENING  11/14/2027    DTAP/TDAP/TD VACCINE (4 - Td or Tdap) 05/20/2029    ADVANCE CARE PLANNING  07/18/2030    HEPATITIS C SCREENING  Completed    HIV SCREENING  Completed    PHQ-2 (once per calendar year)  Completed    AORTIC ANEURYSM SCREENING (SYSTEM ASSIGNED)  Completed    HPV VACCINE  Aged Out    MENINGITIS VACCINE  Aged Out         Review of Systems  Constitutional, HEENT, cardiovascular, pulmonary, GI, , musculoskeletal, neuro, skin, endocrine and psych systems are negative, except as otherwise noted.     Objective    Exam  /80 (BP Location: Left arm, Patient Position: Sitting, Cuff Size: Adult Regular)   Pulse 81   Temp 98.7  F (37.1  C) (Temporal)   Resp 16   Ht 1.78 m (5' 10.08\")   Wt 87.8 kg (193 lb 8 oz)   SpO2 99%   BMI 27.70 kg/m     Estimated body mass index is 27.7 " "kg/m  as calculated from the following:    Height as of this encounter: 1.78 m (5' 10.08\").    Weight as of this encounter: 87.8 kg (193 lb 8 oz).    Physical Exam  GENERAL: alert and no distress  EYES: Eyes grossly normal to inspection, PERRL and conjunctivae and sclerae normal  HENT: ear canals and TM's normal, nose and mouth without ulcers or lesions  NECK: no adenopathy, no asymmetry, masses, or scars  RESP: lungs clear to auscultation - no rales, rhonchi or wheezes  CV: regular rate and rhythm, normal S1 S2, no S3 or S4, no murmur, click or rub, no peripheral edema  ABDOMEN: soft, nontender, no hepatosplenomegaly, no masses and bowel sounds normal  MS: no gross musculoskeletal defects noted, no edema  SKIN: no suspicious lesions or rashes  NEURO: Normal strength and tone, mentation intact and speech normal  PSYCH: mentation appears normal, affect normal/bright  Gait and balance assessed per Gait Speed Test.  Result as above.        7/18/2025   Mini Cog   Clock Draw Score 2 Normal   3 Item Recall 1 object recalled   Mini Cog Total Score 3     {A Mini-Cog total score of 0-2 suggests the possibility of dementia, score of 3-5 suggests no dementia:004937}    Vision Screen  Patient wears corrective lenses (select all that apply): (!) Worn during vision screen, Wears regularly, Prescription older than 1 year  Vision Screen Results: (!) REFER  No Corrective Lenses, PLUS LENS REQUIRED: REFER  {Provider  Link to Vision and Hearing Results :215342}    Signed Electronically by: Rj Carpio MD  {Email feedback regarding this note to [email or web address]   :123917}  Lung Cancer Screening Shared Decision Making Visit     Galdino Nelson, a 65 year old male, is eligible for lung cancer screening    History   Smoking Status    Every Day    Types: Cigarettes   Smokeless Tobacco    Never   {TIP  Follow this link to update the tobacco history if needed :042669}    I have discussed with " patient the risks and benefits of screening for lung cancer with low-dose CT.     The risks include:    radiation exposure: one low dose chest CT has as much ionizing radiation as about 15 chest x-rays, or 6 months of background radiation living in [place]      false positives: most findings/nodules are NOT cancer, but some might still require additional diagnostic evaluation, including biopsy    over-diagnosis: some slow growing cancers that might never have been clinically significant will be detected and treated unnecessarily     The benefit of early detection of lung cancer is contingent upon adherence to annual screening or more frequent follow up if indicated.     Furthermore, to benefit from screening, Galdino must be willing and able to undergo diagnostic procedures, if indicated. Although no specific guide is available for determining severity of comorbidities, it is reasonable to withhold screening in patients who have greater mortality risk from other diseases.     We did discuss that the best way to prevent lung cancer is to not smoke.    Some patients may value a numeric estimation of lung cancer risk when evaluating if lung cancer screening is right for them, here is one calculator:    ShouldIScreen

## 2025-07-21 ENCOUNTER — PATIENT OUTREACH (OUTPATIENT)
Dept: CARE COORDINATION | Facility: CLINIC | Age: 65
End: 2025-07-21
Payer: MEDICAID

## 2025-07-29 ENCOUNTER — HOSPITAL ENCOUNTER (OUTPATIENT)
Dept: CT IMAGING | Facility: CLINIC | Age: 65
Discharge: HOME | End: 2025-07-29
Attending: STUDENT IN AN ORGANIZED HEALTH CARE EDUCATION/TRAINING PROGRAM
Payer: MEDICAID

## 2025-07-29 DIAGNOSIS — Z87.891 PERSONAL HISTORY OF TOBACCO USE: ICD-10-CM

## 2025-07-29 PROCEDURE — 71271 CT THORAX LUNG CANCER SCR C-: CPT

## 2025-08-05 ENCOUNTER — HOSPITAL ENCOUNTER (EMERGENCY)
Facility: CLINIC | Age: 65
Discharge: HOME | End: 2025-08-05
Attending: NURSE PRACTITIONER | Admitting: NURSE PRACTITIONER
Payer: MEDICAID

## 2025-08-05 ENCOUNTER — APPOINTMENT (OUTPATIENT)
Dept: GENERAL RADIOLOGY | Facility: CLINIC | Age: 65
End: 2025-08-05
Attending: NURSE PRACTITIONER
Payer: MEDICAID

## 2025-08-05 ASSESSMENT — COLUMBIA-SUICIDE SEVERITY RATING SCALE - C-SSRS
1. IN THE PAST MONTH, HAVE YOU WISHED YOU WERE DEAD OR WISHED YOU COULD GO TO SLEEP AND NOT WAKE UP?: NO
6. HAVE YOU EVER DONE ANYTHING, STARTED TO DO ANYTHING, OR PREPARED TO DO ANYTHING TO END YOUR LIFE?: NO
2. HAVE YOU ACTUALLY HAD ANY THOUGHTS OF KILLING YOURSELF IN THE PAST MONTH?: NO

## 2025-08-05 ASSESSMENT — ACTIVITIES OF DAILY LIVING (ADL)
ADLS_ACUITY_SCORE: 54

## 2025-08-06 ENCOUNTER — TELEPHONE (OUTPATIENT)
Dept: EDUCATION SERVICES | Facility: OTHER | Age: 65
End: 2025-08-06
Payer: MEDICAID

## 2025-08-11 ENCOUNTER — TELEPHONE (OUTPATIENT)
Dept: FAMILY MEDICINE | Facility: OTHER | Age: 65
End: 2025-08-11
Payer: MEDICAID

## 2025-08-11 ENCOUNTER — PATIENT OUTREACH (OUTPATIENT)
Dept: CARE COORDINATION | Facility: CLINIC | Age: 65
End: 2025-08-11
Payer: MEDICAID

## 2025-08-12 ENCOUNTER — VIRTUAL VISIT (OUTPATIENT)
Dept: EDUCATION SERVICES | Facility: CLINIC | Age: 65
End: 2025-08-12
Payer: MEDICAID

## 2025-08-12 DIAGNOSIS — E10.65 UNCONTROLLED TYPE 1 DIABETES MELLITUS WITH HYPERGLYCEMIA (H): ICD-10-CM

## 2025-08-12 RX ORDER — INSULIN ASPART 100 [IU]/ML
INJECTION, SOLUTION INTRAVENOUS; SUBCUTANEOUS
Qty: 10 ML | Refills: 2 | Status: SHIPPED | OUTPATIENT
Start: 2025-08-12

## 2025-08-15 ENCOUNTER — TELEPHONE (OUTPATIENT)
Dept: EDUCATION SERVICES | Facility: CLINIC | Age: 65
End: 2025-08-15
Payer: MEDICAID

## 2025-08-19 ENCOUNTER — MEDICAL CORRESPONDENCE (OUTPATIENT)
Dept: HEALTH INFORMATION MANAGEMENT | Facility: CLINIC | Age: 65
End: 2025-08-19
Payer: MEDICAID

## 2025-08-20 ENCOUNTER — TELEPHONE (OUTPATIENT)
Dept: FAMILY MEDICINE | Facility: OTHER | Age: 65
End: 2025-08-20
Payer: MEDICAID

## 2025-08-21 ENCOUNTER — TELEPHONE (OUTPATIENT)
Dept: EDUCATION SERVICES | Facility: CLINIC | Age: 65
End: 2025-08-21
Payer: MEDICAID

## 2025-08-25 ENCOUNTER — TELEPHONE (OUTPATIENT)
Dept: EDUCATION SERVICES | Facility: CLINIC | Age: 65
End: 2025-08-25
Payer: MEDICAID

## 2025-08-25 ENCOUNTER — TELEPHONE (OUTPATIENT)
Dept: ENDOCRINOLOGY | Facility: CLINIC | Age: 65
End: 2025-08-25
Payer: MEDICAID

## 2025-08-27 ENCOUNTER — VIRTUAL VISIT (OUTPATIENT)
Dept: ENDOCRINOLOGY | Facility: CLINIC | Age: 65
End: 2025-08-27
Payer: MEDICARE

## 2025-08-27 DIAGNOSIS — E13.9 LADA (LATENT AUTOIMMUNE DIABETES IN ADULTS), MANAGED AS TYPE 1 (H): Primary | ICD-10-CM

## 2025-08-27 PROCEDURE — 1126F AMNT PAIN NOTED NONE PRSNT: CPT | Mod: 95 | Performed by: PHYSICIAN ASSISTANT

## 2025-08-27 PROCEDURE — 98005 SYNCH AUDIO-VIDEO EST LOW 20: CPT | Performed by: PHYSICIAN ASSISTANT

## 2025-08-27 ASSESSMENT — PAIN SCALES - GENERAL: PAINLEVEL_OUTOF10: NO PAIN (0)

## (undated) DEVICE — TUBING SUCTION 6"X3/16" N56A

## (undated) DEVICE — SOL WATER IRRIG 1000ML BOTTLE 2F7114

## (undated) DEVICE — ENDO TRAP POLYP E-TRAP 00711099

## (undated) DEVICE — KIT ENDO TURNOVER/PROCEDURE CARRY-ON 101822

## (undated) DEVICE — ENDO SNARE EXACTO COLD 9MM LOOP 2.4MMX230CM 00711115